# Patient Record
Sex: MALE | Race: BLACK OR AFRICAN AMERICAN | NOT HISPANIC OR LATINO | Employment: OTHER | URBAN - METROPOLITAN AREA
[De-identification: names, ages, dates, MRNs, and addresses within clinical notes are randomized per-mention and may not be internally consistent; named-entity substitution may affect disease eponyms.]

---

## 2017-04-27 ENCOUNTER — HISTORICAL (OUTPATIENT)
Dept: LAB | Facility: HOSPITAL | Age: 72
End: 2017-04-27

## 2017-04-27 ENCOUNTER — HISTORICAL (OUTPATIENT)
Dept: ADMINISTRATIVE | Facility: HOSPITAL | Age: 72
End: 2017-04-27

## 2017-05-09 ENCOUNTER — HISTORICAL (OUTPATIENT)
Dept: LAB | Facility: HOSPITAL | Age: 72
End: 2017-05-09

## 2017-07-05 ENCOUNTER — HISTORICAL (OUTPATIENT)
Dept: LAB | Facility: HOSPITAL | Age: 72
End: 2017-07-05

## 2017-09-18 ENCOUNTER — HISTORICAL (OUTPATIENT)
Dept: LAB | Facility: HOSPITAL | Age: 72
End: 2017-09-18

## 2018-01-18 ENCOUNTER — HISTORICAL (OUTPATIENT)
Dept: LAB | Facility: HOSPITAL | Age: 73
End: 2018-01-18

## 2018-05-11 ENCOUNTER — HISTORICAL (OUTPATIENT)
Dept: LAB | Facility: HOSPITAL | Age: 73
End: 2018-05-11

## 2018-05-13 LAB
FINAL CULTURE: NO GROWTH
FINAL CULTURE: NO GROWTH

## 2018-07-18 ENCOUNTER — HISTORICAL (OUTPATIENT)
Dept: LAB | Facility: HOSPITAL | Age: 73
End: 2018-07-18

## 2018-07-20 LAB
FINAL CULTURE: NO GROWTH
FINAL CULTURE: NO GROWTH

## 2018-07-25 ENCOUNTER — HISTORICAL (OUTPATIENT)
Dept: RADIOLOGY | Facility: HOSPITAL | Age: 73
End: 2018-07-25

## 2018-08-01 ENCOUNTER — HISTORICAL (OUTPATIENT)
Dept: LAB | Facility: HOSPITAL | Age: 73
End: 2018-08-01

## 2018-08-29 ENCOUNTER — HISTORICAL (OUTPATIENT)
Dept: LAB | Facility: HOSPITAL | Age: 73
End: 2018-08-29

## 2018-08-31 ENCOUNTER — HISTORICAL (OUTPATIENT)
Dept: RADIOLOGY | Facility: HOSPITAL | Age: 73
End: 2018-08-31

## 2018-09-07 ENCOUNTER — HISTORICAL (OUTPATIENT)
Dept: SURGERY | Facility: HOSPITAL | Age: 73
End: 2018-09-07

## 2018-10-03 ENCOUNTER — HISTORICAL (OUTPATIENT)
Dept: LAB | Facility: HOSPITAL | Age: 73
End: 2018-10-03

## 2019-01-18 ENCOUNTER — HISTORICAL (OUTPATIENT)
Dept: LAB | Facility: HOSPITAL | Age: 74
End: 2019-01-18

## 2019-05-22 ENCOUNTER — HISTORICAL (OUTPATIENT)
Dept: RADIOLOGY | Facility: HOSPITAL | Age: 74
End: 2019-05-22

## 2019-07-22 ENCOUNTER — HISTORICAL (OUTPATIENT)
Dept: LAB | Facility: HOSPITAL | Age: 74
End: 2019-07-22

## 2020-01-20 ENCOUNTER — HISTORICAL (OUTPATIENT)
Dept: LAB | Facility: HOSPITAL | Age: 75
End: 2020-01-20

## 2020-07-22 ENCOUNTER — HISTORICAL (OUTPATIENT)
Dept: LAB | Facility: HOSPITAL | Age: 75
End: 2020-07-22

## 2020-09-15 LAB
LEFT EYE DM RETINOPATHY: NORMAL
RIGHT EYE DM RETINOPATHY: NORMAL

## 2020-11-17 ENCOUNTER — HISTORICAL (OUTPATIENT)
Dept: LAB | Facility: HOSPITAL | Age: 75
End: 2020-11-17

## 2021-01-04 ENCOUNTER — HISTORICAL (OUTPATIENT)
Dept: LAB | Facility: HOSPITAL | Age: 76
End: 2021-01-04

## 2021-07-06 ENCOUNTER — HISTORICAL (OUTPATIENT)
Dept: LAB | Facility: HOSPITAL | Age: 76
End: 2021-07-06

## 2021-10-15 ENCOUNTER — HISTORICAL (OUTPATIENT)
Dept: LAB | Facility: HOSPITAL | Age: 76
End: 2021-10-15

## 2022-04-07 ENCOUNTER — HISTORICAL (OUTPATIENT)
Dept: ADMINISTRATIVE | Facility: HOSPITAL | Age: 77
End: 2022-04-07
Payer: MEDICARE

## 2022-04-24 VITALS
DIASTOLIC BLOOD PRESSURE: 67 MMHG | OXYGEN SATURATION: 99 % | WEIGHT: 233.69 LBS | HEIGHT: 69 IN | BODY MASS INDEX: 34.61 KG/M2 | SYSTOLIC BLOOD PRESSURE: 152 MMHG

## 2022-05-03 ENCOUNTER — TELEPHONE (OUTPATIENT)
Dept: INTERNAL MEDICINE | Facility: CLINIC | Age: 77
End: 2022-05-03
Payer: MEDICARE

## 2022-05-03 ENCOUNTER — LAB VISIT (OUTPATIENT)
Dept: LAB | Facility: HOSPITAL | Age: 77
End: 2022-05-03
Attending: INTERNAL MEDICINE
Payer: MEDICARE

## 2022-05-03 DIAGNOSIS — I10 HYPERTENSION, UNSPECIFIED TYPE: ICD-10-CM

## 2022-05-03 DIAGNOSIS — E11.9 DIABETES: ICD-10-CM

## 2022-05-03 DIAGNOSIS — E78.5 HYPERLIPIDEMIA, UNSPECIFIED HYPERLIPIDEMIA TYPE: ICD-10-CM

## 2022-05-03 DIAGNOSIS — N40.0 BENIGN PROSTATIC HYPERPLASIA, UNSPECIFIED WHETHER LOWER URINARY TRACT SYMPTOMS PRESENT: Primary | ICD-10-CM

## 2022-05-03 DIAGNOSIS — Z12.5 ENCOUNTER FOR SCREENING FOR MALIGNANT NEOPLASM OF PROSTATE: ICD-10-CM

## 2022-05-03 LAB
ALBUMIN SERPL-MCNC: 3.9 GM/DL (ref 3.4–4.8)
ALBUMIN/GLOB SERPL: 0.9 RATIO (ref 1.1–2)
ALP SERPL-CCNC: 118 UNIT/L (ref 40–150)
ALT SERPL-CCNC: 20 UNIT/L (ref 0–55)
AST SERPL-CCNC: 18 UNIT/L (ref 5–34)
BASOPHILS # BLD AUTO: 0.02 X10(3)/MCL (ref 0–0.2)
BASOPHILS NFR BLD AUTO: 0.3 %
BILIRUBIN DIRECT+TOT PNL SERPL-MCNC: 0.2 MG/DL (ref 0–0.5)
BILIRUBIN DIRECT+TOT PNL SERPL-MCNC: 0.3 MG/DL (ref 0–0.8)
BILIRUBIN DIRECT+TOT PNL SERPL-MCNC: 0.5 MG/DL
BUN SERPL-MCNC: 12 MG/DL (ref 8.4–25.7)
CALCIUM SERPL-MCNC: 10.7 MG/DL (ref 8.8–10)
CHLORIDE SERPL-SCNC: 106 MMOL/L (ref 98–107)
CHOLEST SERPL-MCNC: 184 MG/DL
CHOLEST/HDLC SERPL: 3 {RATIO} (ref 0–5)
CO2 SERPL-SCNC: 25 MMOL/L (ref 23–31)
CREAT SERPL-MCNC: 1.21 MG/DL (ref 0.73–1.18)
CREAT UR-MCNC: 73.5 MG/DL (ref 63–166)
EOSINOPHIL # BLD AUTO: 0.25 X10(3)/MCL (ref 0–0.9)
EOSINOPHIL NFR BLD AUTO: 4.1 %
ERYTHROCYTE [DISTWIDTH] IN BLOOD BY AUTOMATED COUNT: 13.6 % (ref 11.5–17)
EST. AVERAGE GLUCOSE BLD GHB EST-MCNC: 145.6 MG/DL
GLOBULIN SER-MCNC: 4.2 GM/DL (ref 2.4–3.5)
GLUCOSE SERPL-MCNC: 127 MG/DL (ref 82–115)
HBA1C MFR BLD: 6.7 %
HCT VFR BLD AUTO: 46.7 % (ref 42–52)
HDLC SERPL-MCNC: 62 MG/DL (ref 35–60)
HGB BLD-MCNC: 14.8 GM/DL (ref 14–18)
IMM GRANULOCYTES # BLD AUTO: 0.01 X10(3)/MCL (ref 0–0.02)
IMM GRANULOCYTES NFR BLD AUTO: 0.2 % (ref 0–0.43)
LDLC SERPL CALC-MCNC: 94 MG/DL (ref 50–140)
LYMPHOCYTES # BLD AUTO: 2.76 X10(3)/MCL (ref 0.6–4.6)
LYMPHOCYTES NFR BLD AUTO: 44.7 %
MCH RBC QN AUTO: 30.1 PG (ref 27–31)
MCHC RBC AUTO-ENTMCNC: 31.7 MG/DL (ref 33–36)
MCV RBC AUTO: 94.9 FL (ref 80–94)
MICROALBUMIN UR-MCNC: 6.1 UG/ML
MICROALBUMIN/CREAT RATIO PNL UR: 8.3 MG/GM CR (ref 0–30)
MONOCYTES # BLD AUTO: 0.65 X10(3)/MCL (ref 0.1–1.3)
MONOCYTES NFR BLD AUTO: 10.5 %
NEUTROPHILS # BLD AUTO: 2.5 X10(3)/MCL (ref 2.1–9.2)
NEUTROPHILS NFR BLD AUTO: 40.2 %
PLATELET # BLD AUTO: 294 X10(3)/MCL (ref 130–400)
PMV BLD AUTO: 8.2 FL (ref 9.4–12.4)
POTASSIUM SERPL-SCNC: 3.8 MMOL/L (ref 3.5–5.1)
PROT SERPL-MCNC: 8.1 GM/DL (ref 5.8–7.6)
PSA SERPL-MCNC: 7.49 NG/ML
RBC # BLD AUTO: 4.92 X10(6)/MCL (ref 4.7–6.1)
SODIUM SERPL-SCNC: 141 MMOL/L (ref 136–145)
TRIGL SERPL-MCNC: 141 MG/DL (ref 34–140)
VLDLC SERPL CALC-MCNC: 28 MG/DL
WBC # SPEC AUTO: 6.2 X10(3)/MCL (ref 4.5–11.5)

## 2022-05-03 PROCEDURE — 82043 UR ALBUMIN QUANTITATIVE: CPT

## 2022-05-03 PROCEDURE — 84153 ASSAY OF PSA TOTAL: CPT

## 2022-05-03 PROCEDURE — 83036 HEMOGLOBIN GLYCOSYLATED A1C: CPT

## 2022-05-03 PROCEDURE — 36415 COLL VENOUS BLD VENIPUNCTURE: CPT

## 2022-05-03 PROCEDURE — 80053 COMPREHEN METABOLIC PANEL: CPT

## 2022-05-03 PROCEDURE — 80061 LIPID PANEL: CPT

## 2022-05-03 PROCEDURE — 85025 COMPLETE CBC W/AUTO DIFF WBC: CPT

## 2022-05-03 NOTE — TELEPHONE ENCOUNTER
Patient labs resulted to my inbox; noted he has an appointment coming up with you, not me on the 9th.  PSA is elevated.

## 2022-05-06 ENCOUNTER — OFFICE VISIT (OUTPATIENT)
Dept: INTERNAL MEDICINE | Facility: CLINIC | Age: 77
End: 2022-05-06
Payer: MEDICARE

## 2022-05-06 VITALS
BODY MASS INDEX: 35.72 KG/M2 | HEIGHT: 69 IN | DIASTOLIC BLOOD PRESSURE: 63 MMHG | WEIGHT: 241.19 LBS | HEART RATE: 71 BPM | TEMPERATURE: 98 F | SYSTOLIC BLOOD PRESSURE: 138 MMHG | OXYGEN SATURATION: 97 %

## 2022-05-06 DIAGNOSIS — I15.2 HYPERTENSION ASSOCIATED WITH DIABETES: ICD-10-CM

## 2022-05-06 DIAGNOSIS — N40.0 BPH WITH ELEVATED PSA: ICD-10-CM

## 2022-05-06 DIAGNOSIS — E78.5 TYPE 2 DIABETES MELLITUS WITH HYPERLIPIDEMIA: ICD-10-CM

## 2022-05-06 DIAGNOSIS — Z00.00 MEDICARE ANNUAL WELLNESS VISIT, SUBSEQUENT: Primary | ICD-10-CM

## 2022-05-06 DIAGNOSIS — E83.52 HYPERCALCEMIA: ICD-10-CM

## 2022-05-06 DIAGNOSIS — D47.2 MONOCLONAL GAMMOPATHY OF UNKNOWN SIGNIFICANCE (MGUS): ICD-10-CM

## 2022-05-06 DIAGNOSIS — E11.59 HYPERTENSION ASSOCIATED WITH DIABETES: ICD-10-CM

## 2022-05-06 DIAGNOSIS — E66.01 SEVERE OBESITY (BMI 35.0-39.9) WITH COMORBIDITY: ICD-10-CM

## 2022-05-06 DIAGNOSIS — E55.9 VITAMIN D DEFICIENCY: ICD-10-CM

## 2022-05-06 DIAGNOSIS — E78.5 HYPERLIPIDEMIA, UNSPECIFIED HYPERLIPIDEMIA TYPE: ICD-10-CM

## 2022-05-06 DIAGNOSIS — R97.20 BPH WITH ELEVATED PSA: ICD-10-CM

## 2022-05-06 DIAGNOSIS — E11.69 TYPE 2 DIABETES MELLITUS WITH HYPERLIPIDEMIA: ICD-10-CM

## 2022-05-06 PROBLEM — E11.9 TYPE 2 DIABETES MELLITUS, WITHOUT LONG-TERM CURRENT USE OF INSULIN: Status: ACTIVE | Noted: 2022-05-06

## 2022-05-06 PROBLEM — M1A.9XX0 CHRONIC GOUT: Status: ACTIVE | Noted: 2022-05-06

## 2022-05-06 PROCEDURE — 1126F PR PAIN SEVERITY QUANTIFIED, NO PAIN PRESENT: ICD-10-PCS | Mod: CPTII,,, | Performed by: NURSE PRACTITIONER

## 2022-05-06 PROCEDURE — 3288F PR FALLS RISK ASSESSMENT DOCUMENTED: ICD-10-PCS | Mod: CPTII,,, | Performed by: NURSE PRACTITIONER

## 2022-05-06 PROCEDURE — 1160F RVW MEDS BY RX/DR IN RCRD: CPT | Mod: CPTII,,, | Performed by: NURSE PRACTITIONER

## 2022-05-06 PROCEDURE — 1160F PR REVIEW ALL MEDS BY PRESCRIBER/CLIN PHARMACIST DOCUMENTED: ICD-10-PCS | Mod: CPTII,,, | Performed by: NURSE PRACTITIONER

## 2022-05-06 PROCEDURE — 3075F PR MOST RECENT SYSTOLIC BLOOD PRESS GE 130-139MM HG: ICD-10-PCS | Mod: CPTII,,, | Performed by: NURSE PRACTITIONER

## 2022-05-06 PROCEDURE — 1101F PT FALLS ASSESS-DOCD LE1/YR: CPT | Mod: CPTII,,, | Performed by: NURSE PRACTITIONER

## 2022-05-06 PROCEDURE — 99397 PER PM REEVAL EST PAT 65+ YR: CPT | Mod: GY,,, | Performed by: NURSE PRACTITIONER

## 2022-05-06 PROCEDURE — 3078F DIAST BP <80 MM HG: CPT | Mod: CPTII,,, | Performed by: NURSE PRACTITIONER

## 2022-05-06 PROCEDURE — 3066F NEPHROPATHY DOC TX: CPT | Mod: CPTII,,, | Performed by: NURSE PRACTITIONER

## 2022-05-06 PROCEDURE — 3008F PR BODY MASS INDEX (BMI) DOCUMENTED: ICD-10-PCS | Mod: CPTII,,, | Performed by: NURSE PRACTITIONER

## 2022-05-06 PROCEDURE — 1159F MED LIST DOCD IN RCRD: CPT | Mod: CPTII,,, | Performed by: NURSE PRACTITIONER

## 2022-05-06 PROCEDURE — 3061F PR NEG MICROALBUMINURIA RESULT DOCUMENTED/REVIEW: ICD-10-PCS | Mod: CPTII,,, | Performed by: NURSE PRACTITIONER

## 2022-05-06 PROCEDURE — 3066F PR DOCUMENTATION OF TREATMENT FOR NEPHROPATHY: ICD-10-PCS | Mod: CPTII,,, | Performed by: NURSE PRACTITIONER

## 2022-05-06 PROCEDURE — 3008F BODY MASS INDEX DOCD: CPT | Mod: CPTII,,, | Performed by: NURSE PRACTITIONER

## 2022-05-06 PROCEDURE — 1126F AMNT PAIN NOTED NONE PRSNT: CPT | Mod: CPTII,,, | Performed by: NURSE PRACTITIONER

## 2022-05-06 PROCEDURE — 3078F PR MOST RECENT DIASTOLIC BLOOD PRESSURE < 80 MM HG: ICD-10-PCS | Mod: CPTII,,, | Performed by: NURSE PRACTITIONER

## 2022-05-06 PROCEDURE — 1159F PR MEDICATION LIST DOCUMENTED IN MEDICAL RECORD: ICD-10-PCS | Mod: CPTII,,, | Performed by: NURSE PRACTITIONER

## 2022-05-06 PROCEDURE — 3288F FALL RISK ASSESSMENT DOCD: CPT | Mod: CPTII,,, | Performed by: NURSE PRACTITIONER

## 2022-05-06 PROCEDURE — 3075F SYST BP GE 130 - 139MM HG: CPT | Mod: CPTII,,, | Performed by: NURSE PRACTITIONER

## 2022-05-06 PROCEDURE — 99397 PR PREVENTIVE VISIT,EST,65 & OVER: ICD-10-PCS | Mod: GY,,, | Performed by: NURSE PRACTITIONER

## 2022-05-06 PROCEDURE — 3061F NEG MICROALBUMINURIA REV: CPT | Mod: CPTII,,, | Performed by: NURSE PRACTITIONER

## 2022-05-06 PROCEDURE — 1101F PR PT FALLS ASSESS DOC 0-1 FALLS W/OUT INJ PAST YR: ICD-10-PCS | Mod: CPTII,,, | Performed by: NURSE PRACTITIONER

## 2022-05-06 RX ORDER — FINASTERIDE 5 MG/1
TABLET, FILM COATED ORAL
COMMUNITY
Start: 2021-05-05 | End: 2022-06-08

## 2022-05-06 RX ORDER — OLMESARTAN MEDOXOMIL AND HYDROCHLOROTHIAZIDE 40/25 40; 25 MG/1; MG/1
TABLET ORAL
COMMUNITY
Start: 2021-05-18 | End: 2022-05-21 | Stop reason: SDUPTHER

## 2022-05-06 RX ORDER — METFORMIN HYDROCHLORIDE 1000 MG/1
TABLET ORAL
COMMUNITY
Start: 2021-05-18 | End: 2022-06-13

## 2022-05-06 RX ORDER — FEBUXOSTAT 40 MG/1
TABLET, FILM COATED ORAL
COMMUNITY
Start: 2021-12-13 | End: 2022-06-27 | Stop reason: SDUPTHER

## 2022-05-06 RX ORDER — AMLODIPINE BESYLATE 10 MG/1
10 TABLET ORAL
COMMUNITY
Start: 2021-07-01 | End: 2022-05-06 | Stop reason: SDUPTHER

## 2022-05-06 RX ORDER — AMLODIPINE BESYLATE 10 MG/1
10 TABLET ORAL DAILY
Qty: 90 TABLET | Refills: 3 | Status: SHIPPED | OUTPATIENT
Start: 2022-05-06 | End: 2023-03-28 | Stop reason: SDUPTHER

## 2022-05-06 RX ORDER — ATORVASTATIN CALCIUM 40 MG/1
40 TABLET, FILM COATED ORAL NIGHTLY
Qty: 90 TABLET | Refills: 3 | Status: SHIPPED | OUTPATIENT
Start: 2022-05-06 | End: 2023-03-28 | Stop reason: SDUPTHER

## 2022-05-06 RX ORDER — ORAL SEMAGLUTIDE 14 MG/1
14 TABLET ORAL DAILY
Qty: 90 TABLET | Refills: 3 | Status: SHIPPED | OUTPATIENT
Start: 2022-05-06 | End: 2023-08-11

## 2022-05-06 RX ORDER — ERGOCALCIFEROL 1.25 MG/1
CAPSULE ORAL
COMMUNITY
Start: 2022-03-30 | End: 2022-09-15

## 2022-05-06 RX ORDER — CARVEDILOL 6.25 MG/1
6.25 TABLET ORAL
COMMUNITY
Start: 2021-07-20 | End: 2022-09-01

## 2022-05-06 RX ORDER — ATORVASTATIN CALCIUM 20 MG/1
TABLET, FILM COATED ORAL
COMMUNITY
Start: 2021-06-17 | End: 2022-05-06 | Stop reason: SDUPTHER

## 2022-05-06 RX ORDER — METOPROLOL SUCCINATE 50 MG/1
TABLET, EXTENDED RELEASE ORAL
COMMUNITY
Start: 2022-02-16 | End: 2022-09-01

## 2022-05-06 NOTE — PROGRESS NOTES
Patient Name: Galdino Joshi     Date of service:  5/6/22      Member ID: [id number] - (Managed Medicare)    YOB: 1945   Gender: male   Race: White      Ethnicity: Not  or /a   Medical Record Number: [id number]     Reason for Visit:   Chief Complaint   Patient presents with    Medicare AWV        History of Present Illness: HPI   76-year-old -American male presents to clinic today formedicare wellness  A1c 6.7  Labs reviewed with patient.  He has never had a colonoscopy and does not want a colonoscopy right now.   Dr. Sharp- urology for BPH, PSA  Dr. Adorno- cardiologist  Dr. Dawkins- IGG monoclonal gammopathy--no longer has f/u;stable  UTD with pneumonia vaccines, flu vaccine,  declines shingles vaccine from pharmacy  Covid vaccinated    No acute complaints today    Past Medical History:   Diagnosis Date    BPH (benign prostatic hyperplasia)     Essential (primary) hypertension     Mixed hyperlipidemia     Proteinuria     Type 2 diabetes mellitus without complication, without long-term current use of insulin       Providers regularly involved with care (specialists/suppliers):   Patient Care Team:  Francesco Salomon MD as PCP - General (Internal Medicine)     Past Surgical History:   Procedure Laterality Date    CARPAL TUNNEL RELEASE N/A     CARPAL TUNNEL RELEASE N/A     neuroplasty median nerve at carpal tunnel  Right     RELEASE OF ULNAR NERVE AT CUBITAL TUNNEL Right     SPINAL FUSION          Medication reconciliation completed.    Current Outpatient Medications   Medication Sig    atorvastatin (LIPITOR) 10 MG tablet Take 10 mg by mouth once daily.    donepeziL (ARICEPT) 10 MG tablet Take 10 mg by mouth once daily.    losartan (COZAAR) 25 MG tablet Take 25 mg by mouth once daily.    warfarin (COUMADIN) 3 MG tablet Take 6 mg by mouth Daily.     No current facility-administered medications for this visit.      There are no discontinued medications.     Review  of patient's allergies indicates:  No Known Allergies  Social History     Socioeconomic History    Marital status:    Tobacco Use    Smoking status: Never Smoker    Smokeless tobacco: Current User     Types: Chew     Social Determinants of Health     Financial Resource Strain: Low Risk     Difficulty of Paying Living Expenses: Not hard at all   Food Insecurity: No Food Insecurity    Worried About Running Out of Food in the Last Year: Never true    Ran Out of Food in the Last Year: Never true   Transportation Needs: No Transportation Needs    Lack of Transportation (Medical): No    Lack of Transportation (Non-Medical): No   Physical Activity: Sufficiently Active    Days of Exercise per Week: 7 days    Minutes of Exercise per Session: 60 min   Social Connections: Socially Integrated    Frequency of Communication with Friends and Family: More than three times a week    Frequency of Social Gatherings with Friends and Family: Once a week    Attends Restorationism Services: More than 4 times per year    Active Member of Clubs or Organizations: No    Attends Club or Organization Meetings: 1 to 4 times per year    Marital Status:    Housing Stability: Unknown    Unable to Pay for Housing in the Last Year: No    Unstable Housing in the Last Year: No        Family History   Problem Relation Age of Onset    Heart disease Mother           Review of Systems   Constitutional: No fever,  no fatigue, no chills, no night sweats, no weight gain, no weight loss, no changes in appetite.   Eye: No redness, no acute vision loss, no blurred vision, no double vision, no eye pain  ENMT: No sore throat, no nasal drainage, no nose bleeds,  no headache, no ear pain, no ear drainage, no acute hearing loss  Respiratory: No cough, no sputum production, no shortness of breath, no hemoptysis, no wheezing.  Cardiovascular: No chest pain, no chest tightness, no PENA, no PND, no orthopnea, no swelling, no  "palpitations.  Gastrointestinal: No abdominal pain, no nausea, no vomiting, no diarrhea, no constipation, no difficulty swallowing, no change in bowel habits, no rectal bleeding  Genitourinary: no urgency, no frequency, no burning or pain when urinating, no blood in urine, no incontinence  Heme/Lymph: No easy bruising and/or bleeding, no swollen or painful glands.  Endocrine: No polyuria, no polydipsia, no polyphagia, no heat or cold intolerance.  Musculoskeletal: No muscle pain, no muscle weakness, no joint pain, no red or swollen joints.  Integumentary: No rash, no pruritis, no hair or nail changes.  Neurologic: No dizziness, no fainting, no tremors, no tingling and/ or numbness.  Psychiatric: No anxiety, no depression, no memory loss  All Other ROS: Negative with exception of what is documented in the history of present illness      Vitals:    05/06/22 0949   BP: (!) 158/63   Pulse: 71   Temp: 98.4 °F (36.9 °C)   TempSrc: Temporal   SpO2: 97%   Weight: 109.4 kg (241 lb 3.2 oz)   Height: 5' 8.9" (1.75 m)   PainSc: 0-No pain      Body mass index is 35.72 kg/m².     BP RECHECK 138/63    Physical Exam     General : Alert and oriented, No acute distress, well, developed, well nourished, afebrile   Eye :  Normal conjunctiva without injection. Sclerae are nonicteric. No pallor.  HEENT : Normocephalic. Neck supple. Normal hearing. Oral mucosa is moist.  Respiratory : Lungs are clear to auscultation bilaterally, non-labored. Symmetrical chest wall expansion. No crackles, wheeze, or rhonci.  Cardiovascular : Normal rate, Regular rhythm. No murmurs, rubs, or gallops.No Edema.  Gastrointestinal : Soft, nontender, non-distended, bowel sounds normal, no organomegaly, no guarding, no rebound.  Musculoskeletal : Normal ROM.  No muscle tenderness.  Integumentary : Warm to touch. Intact. No rash.    Neurologic : Alert and oriented.   Psychiatric : Cooperative, Appropriate mood & affect.         Health Maintenance Topics with due " status: Not Due       Topic Last Completion Date    Lipid Panel 05/03/2022        Social History     Tobacco Use   Smoking Status Never Smoker   Smokeless Tobacco Current User    Types: Chew          Immunization History   Administered Date(s) Administered    COVID-19, MRNA, LN-S, PF (Pfizer) (Purple Cap) 01/14/2021, 02/04/2021, 10/08/2021, 04/07/2022    Influenza - Trivalent - PF (ADULT) 09/18/2017, 01/18/2019, 11/19/2020, 10/19/2021    Pneumococcal Conjugate - 13 Valent 01/18/2018    Pneumococcal Polysaccharide - 23 Valent 07/24/2019        Lab Results   Component Value Date    CHOL 184 05/03/2022    TRIG 141 (H) 05/03/2022    HDL 62 (H) 05/03/2022    TOTALCHOLEST 3 05/03/2022      Lab Visit on 05/03/2022   Component Date Value Ref Range Status    Urine Microalbumin 05/03/2022 6.1  <=30.0 ug/ml Final    Urine Creatinine 05/03/2022 73.5  63.0 - 166.0 mg/dL Final    Microalbumin Creatinine Ratio 05/03/2022 8.3  0.0 - 30.0 mg/gm Cr Final    Hemoglobin A1c 05/03/2022 6.7  <=7.0 % Final    Estimated Average Glucose 05/03/2022 145.6  mg/dL Final    Sodium Level 05/03/2022 141  136 - 145 mmol/L Final    Potassium Level 05/03/2022 3.8  3.5 - 5.1 mmol/L Final    Chloride 05/03/2022 106  98 - 107 mmol/L Final    Carbon Dioxide 05/03/2022 25  23 - 31 mmol/L Final    Glucose Level 05/03/2022 127 (A) 82 - 115 mg/dL Final    Blood Urea Nitrogen 05/03/2022 12.0  8.4 - 25.7 mg/dL Final    Creatinine 05/03/2022 1.21 (A) 0.73 - 1.18 mg/dL Final    Calcium Level Total 05/03/2022 10.7 (A) 8.8 - 10.0 mg/dL Final    Protein Total 05/03/2022 8.1 (A) 5.8 - 7.6 gm/dL Final    Albumin Level 05/03/2022 3.9  3.4 - 4.8 gm/dL Final    Globulin 05/03/2022 4.2 (A) 2.4 - 3.5 gm/dL Final    Albumin/Globulin Ratio 05/03/2022 0.9 (A) 1.1 - 2.0 ratio Final    Bilirubin Total 05/03/2022 0.5  <=1.5 mg/dL Final    Bilirubin Direct 05/03/2022 0.2  0.0 - 0.5 mg/dL Final    Bilirubin Indirect 05/03/2022 0.30  0.00 - 0.80 mg/dL  Final    Alkaline Phosphatase 05/03/2022 118  40 - 150 unit/L Final    Alanine Aminotransferase 05/03/2022 20  0 - 55 unit/L Final    Aspartate Aminotransferase 05/03/2022 18  5 - 34 unit/L Final    Estimated GFR- 05/03/2022 >60  mls/min/1.73/m2 Final    Estimated GFR-Non  05/03/2022 >60  mls/min/1.73/m2 Final    Cholesterol Total 05/03/2022 184  <=200 mg/dL Final    HDL Cholesterol 05/03/2022 62 (A) 35 - 60 mg/dL Final    Triglyceride 05/03/2022 141 (A) 34 - 140 mg/dL Final    Cholesterol/HDL Ratio 05/03/2022 3  0 - 5 Final    Very Low Density Lipoprotein 05/03/2022 28   Final    LDL Cholesterol 05/03/2022 94.00  50.00 - 140.00 mg/dL Final    WBC 05/03/2022 6.2  4.5 - 11.5 x10(3)/mcL Final    RBC 05/03/2022 4.92  4.70 - 6.10 x10(6)/mcL Final    Hgb 05/03/2022 14.8  14.0 - 18.0 gm/dL Final    Hct 05/03/2022 46.7  42.0 - 52.0 % Final    MCV 05/03/2022 94.9 (A) 80.0 - 94.0 fL Final    MCH 05/03/2022 30.1  27.0 - 31.0 pg Final    MCHC 05/03/2022 31.7 (A) 33.0 - 36.0 mg/dL Final    RDW 05/03/2022 13.6  11.5 - 17.0 % Final    Platelet 05/03/2022 294  130 - 400 x10(3)/mcL Final    MPV 05/03/2022 8.2 (A) 9.4 - 12.4 fL Final    Neutro Auto 05/03/2022 40.2  % Final    Lymph Auto 05/03/2022 44.7  % Final    Mono Auto 05/03/2022 10.5  % Final    Eos Auto 05/03/2022 4.1  % Final    Basophil Auto 05/03/2022 0.3  % Final    Abs Lymph 05/03/2022 2.76  0.6 - 4.6 x10(3)/mcL Final    Abs Neutro 05/03/2022 2.5  2.1 - 9.2 x10(3)/mcL Final    Abs Mono 05/03/2022 0.65  0.1 - 1.3 x10(3)/mcL Final    Abs Eos 05/03/2022 0.25  0 - 0.9 x10(3)/mcL Final    Abs Baso 05/03/2022 0.02  0 - 0.2 x10(3)/mcL Final    IG# 05/03/2022 0.01  0 - 0.0155 x10(3)/mcL Final    IG% 05/03/2022 0.2  0 - 0.43 % Final    Prostate Specific Antigen 05/03/2022 7.49 (A) <=4.00 ng/mL Final     The following assessments were completed and reviewed.  Timed Get Up and Go 5sec  Depression screening  Whisper  Test normal  Nutrition screening  Cognitive function screening  ADLs/Functional status assessment  Physical Activity Questionnaire (PAQ)  Functional/Cognitive Status: Disability Status      Diagnoses with ICD-10 code:    ICD-10-CM ICD-9-CM   1. Medicare annual wellness visit, subsequent  Z00.00 V70.0   2. Hypertension associated with diabetes  E11.59 250.80    I15.2 401.9   3. BPH with elevated PSA  N40.0 600.00    R97.20 790.93   4. Monoclonal gammopathy of unknown significance (MGUS)  D47.2 273.1   5. Vitamin D deficiency  E55.9 268.9      Diagnoses with associated orders:  Problem List Items Addressed This Visit        Cardiac/Vascular    Hypertension associated with diabetes    Relevant Medications    metFORMIN (GLUCOPHAGE) 1000 MG tablet    semaglutide (RYBELSUS) 7 mg tablet       Renal/    BPH with elevated PSA    Overview     Followed by Dr. Sharp, long history of elevated PSA, benign biopsy. Has f/u every 6 months              Oncology    Monoclonal gammopathy of unknown significance (MGUS)    Overview     Followed by Dr. Dawkins              Endocrine    Vitamin D deficiency      Other Visit Diagnoses     Medicare annual wellness visit, subsequent    -  Primary          6. Severe obesity (BMI 35.0-39.9) with comorbidity  Weight loss encouraged, rybelsus increased to help as well    7. Hyperlipidemia, unspecified hyperlipidemia type  ldl not  At goal, ldl goal <7  - atorvastatin (LIPITOR) 40 MG tablet; Take 1 tablet (40 mg total) by mouth every evening.  Dispense: 90 tablet; Refill: 3    8. Type 2 diabetes mellitus with hyperlipidemia  rybelsus increased  Atorvastatin increased  - semaglutide (RYBELSUS) 14 mg tablet; Take 1 tablet (14 mg total) by mouth once daily.  Dispense: 90 tablet; Refill: 3    9. Hypercalcemia  10.7; on hctz  Advised to drink more water  Recheck in 6 weeks  - Basic Metabolic Panel; Future      Plan:  1.  Continue current medications  2.  Side effects and expected results  discussed  3.  Diet and exercise as tolerated  4.  Nutritional support  5.  Health maintenance updated accordingly  6.  Call with increased complaints or concerns  7.  General health maintenance education given.   Age appropriate screenings are up to date/discussed/declined  Vaccines discussed  Labs reviewed with patient.         Screening/prevention plan for the next 10 years:  Goal of exercise is 150 minutes a week.   Encouraged to follow balanced diet with daily servings of fresh fruit and vegetables.  Make sure to schedule all health maintenance appointments to achieve health care goals.   Annual check up is due every 12 months with your designated provider/care team.  Health Maintenance Due   Topic Date Due    Hepatitis C Screening  Never done    TETANUS VACCINE  Never done    Colorectal Cancer Screening  Never done    Shingles Vaccine (1 of 2) Never done      Follow-up: f/u in 6m for dm revisit  Patient Instructions:  Instructions attatched     [Negative] : Musculoskeletal

## 2022-05-06 NOTE — ASSESSMENT & PLAN NOTE
A1C 6.7%, increase rybelsus from 7 to 14mg to help with weight loss  LDL 90, increase atorvastatin from 20 to 40mg for LDL goal <70

## 2022-05-06 NOTE — PROGRESS NOTES
75-year-old -American male presents to clinic today for 3m revisit  Weight down 7lbs since last visit  A1c 6.8; now 6.3  Labs reviewed with patient.  He has never had a colonoscopy and does not want a colonoscopy right now.   Dr. Sharp- urology for BPH, PSA?  Dr. Adorno- cardiologist  Dr. Dawkins- IGG monoclonal gammopathy  UTD with pneumonia vaccines, flu vaccine, shingles vaccine from pharmacy  Diabetic eye exams in January.  Vitamin D is low advised to increase his does to 50 IU once a week  Covid vaccinated  Home blood pressure log reviewed and excellent    Lab Visit on 05/03/2022   Component Date Value Ref Range Status    Urine Microalbumin 05/03/2022 6.1  <=30.0 ug/ml Final    Urine Creatinine 05/03/2022 73.5  63.0 - 166.0 mg/dL Final    Microalbumin Creatinine Ratio 05/03/2022 8.3  0.0 - 30.0 mg/gm Cr Final    Hemoglobin A1c 05/03/2022 6.7  <=7.0 % Final    Estimated Average Glucose 05/03/2022 145.6  mg/dL Final    Sodium Level 05/03/2022 141  136 - 145 mmol/L Final    Potassium Level 05/03/2022 3.8  3.5 - 5.1 mmol/L Final    Chloride 05/03/2022 106  98 - 107 mmol/L Final    Carbon Dioxide 05/03/2022 25  23 - 31 mmol/L Final    Glucose Level 05/03/2022 127 (A) 82 - 115 mg/dL Final    Blood Urea Nitrogen 05/03/2022 12.0  8.4 - 25.7 mg/dL Final    Creatinine 05/03/2022 1.21 (A) 0.73 - 1.18 mg/dL Final    Calcium Level Total 05/03/2022 10.7 (A) 8.8 - 10.0 mg/dL Final    Protein Total 05/03/2022 8.1 (A) 5.8 - 7.6 gm/dL Final    Albumin Level 05/03/2022 3.9  3.4 - 4.8 gm/dL Final    Globulin 05/03/2022 4.2 (A) 2.4 - 3.5 gm/dL Final    Albumin/Globulin Ratio 05/03/2022 0.9 (A) 1.1 - 2.0 ratio Final    Bilirubin Total 05/03/2022 0.5  <=1.5 mg/dL Final    Bilirubin Direct 05/03/2022 0.2  0.0 - 0.5 mg/dL Final    Bilirubin Indirect 05/03/2022 0.30  0.00 - 0.80 mg/dL Final    Alkaline Phosphatase 05/03/2022 118  40 - 150 unit/L Final    Alanine Aminotransferase 05/03/2022 20  0 - 55 unit/L  Final    Aspartate Aminotransferase 05/03/2022 18  5 - 34 unit/L Final    Estimated GFR- 05/03/2022 >60  mls/min/1.73/m2 Final    Estimated GFR-Non  05/03/2022 >60  mls/min/1.73/m2 Final    Cholesterol Total 05/03/2022 184  <=200 mg/dL Final    HDL Cholesterol 05/03/2022 62 (A) 35 - 60 mg/dL Final    Triglyceride 05/03/2022 141 (A) 34 - 140 mg/dL Final    Cholesterol/HDL Ratio 05/03/2022 3  0 - 5 Final    Very Low Density Lipoprotein 05/03/2022 28   Final    LDL Cholesterol 05/03/2022 94.00  50.00 - 140.00 mg/dL Final    WBC 05/03/2022 6.2  4.5 - 11.5 x10(3)/mcL Final    RBC 05/03/2022 4.92  4.70 - 6.10 x10(6)/mcL Final    Hgb 05/03/2022 14.8  14.0 - 18.0 gm/dL Final    Hct 05/03/2022 46.7  42.0 - 52.0 % Final    MCV 05/03/2022 94.9 (A) 80.0 - 94.0 fL Final    MCH 05/03/2022 30.1  27.0 - 31.0 pg Final    MCHC 05/03/2022 31.7 (A) 33.0 - 36.0 mg/dL Final    RDW 05/03/2022 13.6  11.5 - 17.0 % Final    Platelet 05/03/2022 294  130 - 400 x10(3)/mcL Final    MPV 05/03/2022 8.2 (A) 9.4 - 12.4 fL Final    Neutro Auto 05/03/2022 40.2  % Final    Lymph Auto 05/03/2022 44.7  % Final    Mono Auto 05/03/2022 10.5  % Final    Eos Auto 05/03/2022 4.1  % Final    Basophil Auto 05/03/2022 0.3  % Final    Abs Lymph 05/03/2022 2.76  0.6 - 4.6 x10(3)/mcL Final    Abs Neutro 05/03/2022 2.5  2.1 - 9.2 x10(3)/mcL Final    Abs Mono 05/03/2022 0.65  0.1 - 1.3 x10(3)/mcL Final    Abs Eos 05/03/2022 0.25  0 - 0.9 x10(3)/mcL Final    Abs Baso 05/03/2022 0.02  0 - 0.2 x10(3)/mcL Final    IG# 05/03/2022 0.01  0 - 0.0155 x10(3)/mcL Final    IG% 05/03/2022 0.2  0 - 0.43 % Final    Prostate Specific Antigen 05/03/2022 7.49 (A) <=4.00 ng/mL Final

## 2022-06-27 ENCOUNTER — TELEPHONE (OUTPATIENT)
Dept: INTERNAL MEDICINE | Facility: CLINIC | Age: 77
End: 2022-06-27
Payer: MEDICARE

## 2022-06-27 RX ORDER — COLCHICINE 0.6 MG/1
TABLET ORAL
Qty: 3 TABLET | Refills: 4 | Status: SHIPPED | OUTPATIENT
Start: 2022-06-27

## 2022-06-27 RX ORDER — FEBUXOSTAT 40 MG/1
40 TABLET, FILM COATED ORAL DAILY
Qty: 90 TABLET | Refills: 3 | Status: SHIPPED | OUTPATIENT
Start: 2022-06-27 | End: 2023-08-11

## 2022-06-27 NOTE — TELEPHONE ENCOUNTER
----- Message from Briana Luke sent at 6/27/2022 10:02 AM CDT -----  Regarding: Advice  Type:  Needs Medical Advice    Who Called: Patient wife  Symptoms (please be specific): Gout flare up   How long has patient had these symptoms:  couple days ago   [facility] and [facility]  Would the patient rather a call back or a response via [facility]?   Best Call Back Number:  [phone]  Additional Information:

## 2022-06-27 NOTE — TELEPHONE ENCOUNTER
----- Message from Briana Luke sent at 6/27/2022 10:02 AM CDT -----  Regarding: Advice  Type:  Needs Medical Advice    Who Called: Patient wife  Symptoms (please be specific): Gout flare up   How long has patient had these symptoms:  couple days ago   [facility]  Would the patient rather a call back or a response via [facility]?   Best Call Back Number:  [phone]  Additional Information:

## 2022-07-05 NOTE — PROGRESS NOTES
Patient ID: [id number]     Chief Complaint: left foot pain         HPI:     Galdino Joshi is a 76 y.o. male here today with c/o pain to ball of left foot x weeks  Thought he was having gout flare, took colchicine without relief  No prior injury to foot or increased exercise/activity recently  Reports pain as dull ache with associated numbness/tingling           He has never had a colonoscopy and does not want a colonoscopy right now.   Dr. Sharp- urology for BPH, PSA  Dr. Adorno- cardiologist  Dr. Dawkins- IGG monoclonal gammopathy--no longer has f/u;stable  UTD with pneumonia vaccines, flu vaccine,  declines shingles vaccine from pharmacy  Covid vaccinated      Past Medical History:  BPH (benign prostatic hyperplasia)  Essential (primary) hypertension  Mixed hyperlipidemia  Proteinuria  Type 2 diabetes mellitus without complication, without long-term   current use of insulin     Past Surgical History:   Procedure Laterality Date    CARPAL TUNNEL RELEASE N/A     CARPAL TUNNEL RELEASE N/A     neuroplasty median nerve at carpal tunnel  Right     RELEASE OF ULNAR NERVE AT CUBITAL TUNNEL Right     SPINAL FUSION         Review of patient's allergies indicates:  No Known Allergies    Outpatient Medications Marked as Taking for the 7/6/22 encounter (Office Visit) with TREY Fitzgerald   Medication Sig Dispense Refill    amLODIPine (NORVASC) 10 MG tablet Take 1 tablet (10 mg total) by mouth once daily. 90 tablet 3    atorvastatin (LIPITOR) 40 MG tablet Take 1 tablet (40 mg total) by mouth every evening. 90 tablet 3    carvediloL (COREG) 6.25 MG tablet Take 6.25 mg by mouth.      colchicine (COLCRYS) 0.6 mg tablet 1.2 mg p.o. x1 then 0.6 mg p.o. 1 hour later x1 3 tablet 4    ergocalciferol (ERGOCALCIFEROL) 50,000 unit Cap       febuxostat (ULORIC) 40 mg Tab Take 1 tablet (40 mg total) by mouth once daily. 90 tablet 3    finasteride (PROSCAR) 5 mg tablet TAKE 1 TABLET EVERY DAY 90 tablet 4    metFORMIN  (GLUCOPHAGE) 1000 MG tablet TAKE 1 TABLET TWICE DAILY 180 tablet 4    metoprolol succinate (TOPROL-XL) 50 MG 24 hr tablet       olmesartan-hydrochlorothiazide (BENICAR HCT) 40-25 mg per tablet TAKE 1 TABLET EVERY DAY 90 tablet 4    semaglutide (RYBELSUS) 14 mg tablet Take 1 tablet (14 mg total) by mouth once daily. 90 tablet 3       Social History     Socioeconomic History    Marital status:    Tobacco Use    Smoking status: Never Smoker    Smokeless tobacco: Current User     Types: Chew     Social Determinants of Health     Financial Resource Strain: Low Risk     Difficulty of Paying Living Expenses: Not hard at all   Food Insecurity: No Food Insecurity    Worried About Running Out of Food in the Last Year: Never true    Ran Out of Food in the Last Year: Never true   Transportation Needs: No Transportation Needs    Lack of Transportation (Medical): No    Lack of Transportation (Non-Medical): No   Physical Activity: Sufficiently Active    Days of Exercise per Week: 7 days    Minutes of Exercise per Session: 60 min   Social Connections: Socially Integrated    Frequency of Communication with Friends and Family: More than three times a week    Frequency of Social Gatherings with Friends and Family: Once a week    Attends Hoahaoism Services: More than 4 times per year    Active Member of Clubs or Organizations: No    Attends Club or Organization Meetings: 1 to 4 times per year    Marital Status:    Housing Stability: Unknown    Unable to Pay for Housing in the Last Year: No    Unstable Housing in the Last Year: No        Family History   Problem Relation Age of Onset    Heart disease Mother         Subjective:     ROS      See HPI for details    Constitutional: Denies Change in appetite. Denies Chills. Denies Fever. Denies Night sweats.  Endocrine: Denies Cold intolerance. Denies Excessive thirst. Denies Heat intolerance. Denies Weight loss. Denies Weight gain.  Musculoskeletal: +  "left foot pain,  Denies Weakness.  Integumentary: Denies Rash. Denies Itching. Denies Dry skin.  Neurologic: Denies Dizziness. Denies Fainting. Denies Headache.    All Other ROS: Negative except as stated in HPI.       Objective:     /68   Pulse 98   Temp 98.9 °F (37.2 °C) (Temporal)   Ht 5' 9" (1.753 m)   Wt 105.2 kg (232 lb)   SpO2 98%   BMI 34.26 kg/m²     Physical Exam    General: Alert and oriented, No acute distress.  Head: Normocephalic,   Respiratory: , Symmetrical chest wall expansion.  Musculoskeletal: Normal range of motion.  Integumentary: Warm, Dry callus to bilateral calls of feet  Extremities: No clubbing, cyanosis or edema  Neurologic:see below.  Psychiatric: Normal interaction,Appropriate affect     Diabetic foot exam:   Left: Reflexes 2+    Vibratory sensation diminished   Proprioception diminished   Sharp/dull discrimination diminished   Filament test absent  Right: Reflexes 2+    Vibratory sensation normal   Proprioception normal   Sharp/dull discrimination normal   Filament test present        Assessment:       ICD-10-CM ICD-9-CM   1. Acute foot pain, left  M79.672 729.5   2. Diabetic autonomic neuropathy associated with type 2 diabetes mellitus  E11.43 250.60     337.1        Plan:     1. Acute foot pain, left  - Ambulatory referral/consult to Podiatry; Future    2. Diabetic autonomic neuropathy associated with type 2 diabetes mellitus  - Ambulatory referral/consult to Podiatry; Future  - gabapentin (NEURONTIN) 300 MG capsule; Take 1 capsule (300 mg total) by mouth 3 (three) times daily as needed (neuropathic pain).  Dispense: 90 capsule; Refill: 0    Referral to podiatry for dm foot care/shoes/inserts  Dm foot education done, handout provided   Trial of gabapentin prn; can cause drowsiness        Medication List with Changes/Refills   New Medications    GABAPENTIN (NEURONTIN) 300 MG CAPSULE    Take 1 capsule (300 mg total) by mouth 3 (three) times daily as needed (neuropathic " pain).       Start Date: 7/6/2022  End Date: 7/6/2023   Current Medications    AMLODIPINE (NORVASC) 10 MG TABLET    Take 1 tablet (10 mg total) by mouth once daily.       Start Date: 5/6/2022  End Date: --    ATORVASTATIN (LIPITOR) 40 MG TABLET    Take 1 tablet (40 mg total) by mouth every evening.       Start Date: 5/6/2022  End Date: --    CARVEDILOL (COREG) 6.25 MG TABLET    Take 6.25 mg by mouth.       Start Date: 7/20/2021 End Date: --    COLCHICINE (COLCRYS) 0.6 MG TABLET    1.2 mg p.o. x1 then 0.6 mg p.o. 1 hour later x1       Start Date: 6/27/2022 End Date: --    ERGOCALCIFEROL (ERGOCALCIFEROL) 50,000 UNIT CAP           Start Date: 3/30/2022 End Date: --    FEBUXOSTAT (ULORIC) 40 MG TAB    Take 1 tablet (40 mg total) by mouth once daily.       Start Date: 6/27/2022 End Date: --    FINASTERIDE (PROSCAR) 5 MG TABLET    TAKE 1 TABLET EVERY DAY       Start Date: 6/8/2022  End Date: --    METFORMIN (GLUCOPHAGE) 1000 MG TABLET    TAKE 1 TABLET TWICE DAILY       Start Date: 6/13/2022 End Date: --    METOPROLOL SUCCINATE (TOPROL-XL) 50 MG 24 HR TABLET           Start Date: 2/16/2022 End Date: --    OLMESARTAN-HYDROCHLOROTHIAZIDE (BENICAR HCT) 40-25 MG PER TABLET    TAKE 1 TABLET EVERY DAY       Start Date: 5/21/2022 End Date: 5/21/2023    SEMAGLUTIDE (RYBELSUS) 14 MG TABLET    Take 1 tablet (14 mg total) by mouth once daily.       Start Date: 5/6/2022  End Date: --          Follow up if symptoms worsen or fail to improve.

## 2022-07-06 ENCOUNTER — OFFICE VISIT (OUTPATIENT)
Dept: INTERNAL MEDICINE | Facility: CLINIC | Age: 77
End: 2022-07-06
Payer: MEDICARE

## 2022-07-06 VITALS
TEMPERATURE: 99 F | WEIGHT: 232 LBS | OXYGEN SATURATION: 98 % | HEART RATE: 98 BPM | HEIGHT: 69 IN | SYSTOLIC BLOOD PRESSURE: 138 MMHG | DIASTOLIC BLOOD PRESSURE: 68 MMHG | BODY MASS INDEX: 34.36 KG/M2

## 2022-07-06 DIAGNOSIS — M79.672 ACUTE FOOT PAIN, LEFT: Primary | ICD-10-CM

## 2022-07-06 DIAGNOSIS — E11.43 DIABETIC AUTONOMIC NEUROPATHY ASSOCIATED WITH TYPE 2 DIABETES MELLITUS: ICD-10-CM

## 2022-07-06 PROCEDURE — 3075F SYST BP GE 130 - 139MM HG: CPT | Mod: CPTII,,, | Performed by: NURSE PRACTITIONER

## 2022-07-06 PROCEDURE — 1159F PR MEDICATION LIST DOCUMENTED IN MEDICAL RECORD: ICD-10-PCS | Mod: CPTII,,, | Performed by: NURSE PRACTITIONER

## 2022-07-06 PROCEDURE — 1159F MED LIST DOCD IN RCRD: CPT | Mod: CPTII,,, | Performed by: NURSE PRACTITIONER

## 2022-07-06 PROCEDURE — 3078F DIAST BP <80 MM HG: CPT | Mod: CPTII,,, | Performed by: NURSE PRACTITIONER

## 2022-07-06 PROCEDURE — 1160F PR REVIEW ALL MEDS BY PRESCRIBER/CLIN PHARMACIST DOCUMENTED: ICD-10-PCS | Mod: CPTII,,, | Performed by: NURSE PRACTITIONER

## 2022-07-06 PROCEDURE — 99214 PR OFFICE/OUTPT VISIT, EST, LEVL IV, 30-39 MIN: ICD-10-PCS | Mod: ,,, | Performed by: NURSE PRACTITIONER

## 2022-07-06 PROCEDURE — 1125F AMNT PAIN NOTED PAIN PRSNT: CPT | Mod: CPTII,,, | Performed by: NURSE PRACTITIONER

## 2022-07-06 PROCEDURE — 1160F RVW MEDS BY RX/DR IN RCRD: CPT | Mod: CPTII,,, | Performed by: NURSE PRACTITIONER

## 2022-07-06 PROCEDURE — 3288F PR FALLS RISK ASSESSMENT DOCUMENTED: ICD-10-PCS | Mod: CPTII,,, | Performed by: NURSE PRACTITIONER

## 2022-07-06 PROCEDURE — 99214 OFFICE O/P EST MOD 30 MIN: CPT | Mod: ,,, | Performed by: NURSE PRACTITIONER

## 2022-07-06 PROCEDURE — 3008F PR BODY MASS INDEX (BMI) DOCUMENTED: ICD-10-PCS | Mod: CPTII,,, | Performed by: NURSE PRACTITIONER

## 2022-07-06 PROCEDURE — 3288F FALL RISK ASSESSMENT DOCD: CPT | Mod: CPTII,,, | Performed by: NURSE PRACTITIONER

## 2022-07-06 PROCEDURE — 1101F PT FALLS ASSESS-DOCD LE1/YR: CPT | Mod: CPTII,,, | Performed by: NURSE PRACTITIONER

## 2022-07-06 PROCEDURE — 3061F PR NEG MICROALBUMINURIA RESULT DOCUMENTED/REVIEW: ICD-10-PCS | Mod: CPTII,,, | Performed by: NURSE PRACTITIONER

## 2022-07-06 PROCEDURE — 3061F NEG MICROALBUMINURIA REV: CPT | Mod: CPTII,,, | Performed by: NURSE PRACTITIONER

## 2022-07-06 PROCEDURE — 3066F PR DOCUMENTATION OF TREATMENT FOR NEPHROPATHY: ICD-10-PCS | Mod: CPTII,,, | Performed by: NURSE PRACTITIONER

## 2022-07-06 PROCEDURE — 3075F PR MOST RECENT SYSTOLIC BLOOD PRESS GE 130-139MM HG: ICD-10-PCS | Mod: CPTII,,, | Performed by: NURSE PRACTITIONER

## 2022-07-06 PROCEDURE — 3066F NEPHROPATHY DOC TX: CPT | Mod: CPTII,,, | Performed by: NURSE PRACTITIONER

## 2022-07-06 PROCEDURE — 1101F PR PT FALLS ASSESS DOC 0-1 FALLS W/OUT INJ PAST YR: ICD-10-PCS | Mod: CPTII,,, | Performed by: NURSE PRACTITIONER

## 2022-07-06 PROCEDURE — 3008F BODY MASS INDEX DOCD: CPT | Mod: CPTII,,, | Performed by: NURSE PRACTITIONER

## 2022-07-06 PROCEDURE — 3078F PR MOST RECENT DIASTOLIC BLOOD PRESSURE < 80 MM HG: ICD-10-PCS | Mod: CPTII,,, | Performed by: NURSE PRACTITIONER

## 2022-07-06 PROCEDURE — 1125F PR PAIN SEVERITY QUANTIFIED, PAIN PRESENT: ICD-10-PCS | Mod: CPTII,,, | Performed by: NURSE PRACTITIONER

## 2022-07-06 RX ORDER — GABAPENTIN 300 MG/1
300 CAPSULE ORAL 3 TIMES DAILY PRN
Qty: 90 CAPSULE | Refills: 0 | Status: SHIPPED | OUTPATIENT
Start: 2022-07-06 | End: 2024-03-26

## 2022-08-11 ENCOUNTER — PATIENT OUTREACH (OUTPATIENT)
Dept: ADMINISTRATIVE | Facility: HOSPITAL | Age: 77
End: 2022-08-11
Payer: MEDICARE

## 2022-08-25 ENCOUNTER — DOCUMENTATION ONLY (OUTPATIENT)
Dept: ADMINISTRATIVE | Facility: HOSPITAL | Age: 77
End: 2022-08-25
Payer: MEDICARE

## 2022-08-26 ENCOUNTER — DOCUMENTATION ONLY (OUTPATIENT)
Dept: ADMINISTRATIVE | Facility: HOSPITAL | Age: 77
End: 2022-08-26
Payer: MEDICARE

## 2022-09-19 ENCOUNTER — IMMUNIZATION (OUTPATIENT)
Dept: FAMILY MEDICINE | Facility: CLINIC | Age: 77
End: 2022-09-19
Payer: MEDICARE

## 2022-09-19 DIAGNOSIS — Z23 NEED FOR VACCINATION: Primary | ICD-10-CM

## 2022-09-19 PROCEDURE — 0124A COVID-19, MRNA, LNP-S, BIVALENT BOOSTER, PF, 30 MCG/0.3 ML DOSE: CPT | Mod: CV19,S$GLB,, | Performed by: EMERGENCY MEDICINE

## 2022-09-19 PROCEDURE — 0124A COVID-19, MRNA, LNP-S, BIVALENT BOOSTER, PF, 30 MCG/0.3 ML DOSE: ICD-10-PCS | Mod: CV19,S$GLB,, | Performed by: EMERGENCY MEDICINE

## 2022-09-19 PROCEDURE — 91312 COVID-19, MRNA, LNP-S, BIVALENT BOOSTER, PF, 30 MCG/0.3 ML DOSE: ICD-10-PCS | Mod: S$GLB,,, | Performed by: EMERGENCY MEDICINE

## 2022-09-19 PROCEDURE — 91312 COVID-19, MRNA, LNP-S, BIVALENT BOOSTER, PF, 30 MCG/0.3 ML DOSE: CPT | Mod: S$GLB,,, | Performed by: EMERGENCY MEDICINE

## 2022-10-31 ENCOUNTER — TELEPHONE (OUTPATIENT)
Dept: ADMINISTRATIVE | Facility: HOSPITAL | Age: 77
End: 2022-10-31
Payer: MEDICARE

## 2022-10-31 DIAGNOSIS — E11.9 TYPE 2 DIABETES MELLITUS WITHOUT COMPLICATION, UNSPECIFIED WHETHER LONG TERM INSULIN USE: Primary | ICD-10-CM

## 2022-10-31 NOTE — TELEPHONE ENCOUNTER
----- Message from Gregory Tanner MA sent at 10/31/2022  8:41 AM CDT -----  Regarding: PV 11/7/22 @ 11:00 Dr. Thacker  1. Are there any outstanding tasks in the patient's chart? Yes, fasting    2. Is there any documentation in the chart? No    3.Has patient been seen in an ER, Urgent care clinic, or been admitted since last visit?  If yes, When, where, and why    4. Has patient seen any other healthcare providers since last visit?  If yes, when, where, and why    5. Has patient had any bloodwork or XR done since last visit?    6. Is patient signed up for patient portal?

## 2022-11-03 ENCOUNTER — LAB VISIT (OUTPATIENT)
Dept: LAB | Facility: HOSPITAL | Age: 77
End: 2022-11-03
Attending: INTERNAL MEDICINE
Payer: MEDICARE

## 2022-11-03 DIAGNOSIS — E11.9 TYPE 2 DIABETES MELLITUS WITHOUT COMPLICATION, UNSPECIFIED WHETHER LONG TERM INSULIN USE: ICD-10-CM

## 2022-11-03 DIAGNOSIS — E83.52 HYPERCALCEMIA: ICD-10-CM

## 2022-11-03 LAB
ALBUMIN SERPL-MCNC: 3.8 GM/DL (ref 3.4–4.8)
ALBUMIN/GLOB SERPL: 1 RATIO (ref 1.1–2)
ALP SERPL-CCNC: 114 UNIT/L (ref 40–150)
ALT SERPL-CCNC: 20 UNIT/L (ref 0–55)
AST SERPL-CCNC: 17 UNIT/L (ref 5–34)
BILIRUBIN DIRECT+TOT PNL SERPL-MCNC: 0.6 MG/DL
BUN SERPL-MCNC: 16 MG/DL (ref 8.4–25.7)
CALCIUM SERPL-MCNC: 9.4 MG/DL (ref 8.8–10)
CHLORIDE SERPL-SCNC: 102 MMOL/L (ref 98–107)
CHOLEST SERPL-MCNC: 162 MG/DL
CHOLEST/HDLC SERPL: 3 {RATIO} (ref 0–5)
CO2 SERPL-SCNC: 26 MMOL/L (ref 23–31)
CREAT SERPL-MCNC: 1.42 MG/DL (ref 0.73–1.18)
CREAT UR-MCNC: 65.7 MG/DL (ref 63–166)
EST. AVERAGE GLUCOSE BLD GHB EST-MCNC: 154.2 MG/DL
GFR SERPLBLD CREATININE-BSD FMLA CKD-EPI: 52 MLS/MIN/1.73/M2
GLOBULIN SER-MCNC: 4 GM/DL (ref 2.4–3.5)
GLUCOSE SERPL-MCNC: 176 MG/DL (ref 82–115)
HBA1C MFR BLD: 7 %
HDLC SERPL-MCNC: 53 MG/DL (ref 35–60)
LDLC SERPL CALC-MCNC: 90 MG/DL (ref 50–140)
MICROALBUMIN UR-MCNC: <5 UG/ML
MICROALBUMIN/CREAT RATIO PNL UR: <7.6 MG/GM CR (ref 0–30)
POTASSIUM SERPL-SCNC: 3.9 MMOL/L (ref 3.5–5.1)
PROT SERPL-MCNC: 7.8 GM/DL (ref 5.8–7.6)
SODIUM SERPL-SCNC: 137 MMOL/L (ref 136–145)
TRIGL SERPL-MCNC: 93 MG/DL (ref 34–140)
VLDLC SERPL CALC-MCNC: 19 MG/DL

## 2022-11-03 PROCEDURE — 82043 UR ALBUMIN QUANTITATIVE: CPT

## 2022-11-03 PROCEDURE — 36415 COLL VENOUS BLD VENIPUNCTURE: CPT

## 2022-11-03 PROCEDURE — 80061 LIPID PANEL: CPT

## 2022-11-03 PROCEDURE — 83036 HEMOGLOBIN GLYCOSYLATED A1C: CPT

## 2022-11-03 PROCEDURE — 80053 COMPREHEN METABOLIC PANEL: CPT

## 2022-11-07 ENCOUNTER — OFFICE VISIT (OUTPATIENT)
Dept: INTERNAL MEDICINE | Facility: CLINIC | Age: 77
End: 2022-11-07
Payer: MEDICARE

## 2022-11-07 VITALS
BODY MASS INDEX: 38.41 KG/M2 | DIASTOLIC BLOOD PRESSURE: 84 MMHG | RESPIRATION RATE: 16 BRPM | TEMPERATURE: 98 F | OXYGEN SATURATION: 99 % | HEART RATE: 94 BPM | WEIGHT: 239 LBS | HEIGHT: 66 IN | SYSTOLIC BLOOD PRESSURE: 150 MMHG

## 2022-11-07 DIAGNOSIS — G62.89 OTHER POLYNEUROPATHY: ICD-10-CM

## 2022-11-07 DIAGNOSIS — E11.42 TYPE 2 DIABETES MELLITUS WITH DIABETIC POLYNEUROPATHY, WITHOUT LONG-TERM CURRENT USE OF INSULIN: ICD-10-CM

## 2022-11-07 DIAGNOSIS — D47.2 MONOCLONAL GAMMOPATHY OF UNKNOWN SIGNIFICANCE (MGUS): ICD-10-CM

## 2022-11-07 DIAGNOSIS — D47.2 MGUS (MONOCLONAL GAMMOPATHY OF UNKNOWN SIGNIFICANCE): Primary | ICD-10-CM

## 2022-11-07 DIAGNOSIS — Z23 NEED FOR VACCINATION: ICD-10-CM

## 2022-11-07 DIAGNOSIS — E78.5 TYPE 2 DIABETES MELLITUS WITH HYPERLIPIDEMIA: ICD-10-CM

## 2022-11-07 DIAGNOSIS — E11.69 TYPE 2 DIABETES MELLITUS WITH HYPERLIPIDEMIA: ICD-10-CM

## 2022-11-07 PROBLEM — G62.9 PERIPHERAL NEUROPATHY: Status: ACTIVE | Noted: 2022-11-07

## 2022-11-07 PROCEDURE — 1101F PR PT FALLS ASSESS DOC 0-1 FALLS W/OUT INJ PAST YR: ICD-10-PCS | Mod: CPTII,,, | Performed by: INTERNAL MEDICINE

## 2022-11-07 PROCEDURE — 3066F NEPHROPATHY DOC TX: CPT | Mod: CPTII,,, | Performed by: INTERNAL MEDICINE

## 2022-11-07 PROCEDURE — 90694 VACC AIIV4 NO PRSRV 0.5ML IM: CPT | Mod: ,,, | Performed by: INTERNAL MEDICINE

## 2022-11-07 PROCEDURE — 1159F PR MEDICATION LIST DOCUMENTED IN MEDICAL RECORD: ICD-10-PCS | Mod: CPTII,,, | Performed by: INTERNAL MEDICINE

## 2022-11-07 PROCEDURE — 1159F MED LIST DOCD IN RCRD: CPT | Mod: CPTII,,, | Performed by: INTERNAL MEDICINE

## 2022-11-07 PROCEDURE — 1160F PR REVIEW ALL MEDS BY PRESCRIBER/CLIN PHARMACIST DOCUMENTED: ICD-10-PCS | Mod: CPTII,,, | Performed by: INTERNAL MEDICINE

## 2022-11-07 PROCEDURE — 99214 PR OFFICE/OUTPT VISIT, EST, LEVL IV, 30-39 MIN: ICD-10-PCS | Mod: ,,, | Performed by: INTERNAL MEDICINE

## 2022-11-07 PROCEDURE — 3061F NEG MICROALBUMINURIA REV: CPT | Mod: CPTII,,, | Performed by: INTERNAL MEDICINE

## 2022-11-07 PROCEDURE — 3077F PR MOST RECENT SYSTOLIC BLOOD PRESSURE >= 140 MM HG: ICD-10-PCS | Mod: CPTII,,, | Performed by: INTERNAL MEDICINE

## 2022-11-07 PROCEDURE — 3288F FALL RISK ASSESSMENT DOCD: CPT | Mod: CPTII,,, | Performed by: INTERNAL MEDICINE

## 2022-11-07 PROCEDURE — 3077F SYST BP >= 140 MM HG: CPT | Mod: CPTII,,, | Performed by: INTERNAL MEDICINE

## 2022-11-07 PROCEDURE — G0008 ADMIN INFLUENZA VIRUS VAC: HCPCS | Mod: ,,, | Performed by: INTERNAL MEDICINE

## 2022-11-07 PROCEDURE — 1101F PT FALLS ASSESS-DOCD LE1/YR: CPT | Mod: CPTII,,, | Performed by: INTERNAL MEDICINE

## 2022-11-07 PROCEDURE — 3288F PR FALLS RISK ASSESSMENT DOCUMENTED: ICD-10-PCS | Mod: CPTII,,, | Performed by: INTERNAL MEDICINE

## 2022-11-07 PROCEDURE — 3066F PR DOCUMENTATION OF TREATMENT FOR NEPHROPATHY: ICD-10-PCS | Mod: CPTII,,, | Performed by: INTERNAL MEDICINE

## 2022-11-07 PROCEDURE — 3079F DIAST BP 80-89 MM HG: CPT | Mod: CPTII,,, | Performed by: INTERNAL MEDICINE

## 2022-11-07 PROCEDURE — 99214 OFFICE O/P EST MOD 30 MIN: CPT | Mod: ,,, | Performed by: INTERNAL MEDICINE

## 2022-11-07 PROCEDURE — 3061F PR NEG MICROALBUMINURIA RESULT DOCUMENTED/REVIEW: ICD-10-PCS | Mod: CPTII,,, | Performed by: INTERNAL MEDICINE

## 2022-11-07 PROCEDURE — 1160F RVW MEDS BY RX/DR IN RCRD: CPT | Mod: CPTII,,, | Performed by: INTERNAL MEDICINE

## 2022-11-07 PROCEDURE — 90694 FLU VACCINE - QUADRIVALENT - ADJUVANTED: ICD-10-PCS | Mod: ,,, | Performed by: INTERNAL MEDICINE

## 2022-11-07 PROCEDURE — G0008 FLU VACCINE - QUADRIVALENT - ADJUVANTED: ICD-10-PCS | Mod: ,,, | Performed by: INTERNAL MEDICINE

## 2022-11-07 PROCEDURE — 3079F PR MOST RECENT DIASTOLIC BLOOD PRESSURE 80-89 MM HG: ICD-10-PCS | Mod: CPTII,,, | Performed by: INTERNAL MEDICINE

## 2022-11-07 NOTE — PROGRESS NOTES
Subjective:      Patient ID: Galdino Joshi is a 77 y.o. male.    Chief Complaint: Diabetes (6 month FU)      HPI:  77 y.o. male for diabetic revisit  He has never had a colonoscopy and does not want a colonoscopy right now.   Dr. Sharp- urology for BPH, PSA. Last PSA at 7.4; goes back to see him on   Dr. Adorno- cardiologist; hasn't been in a couple years  Dr. Dawkins- IGG monoclonal gammopathy; globulin 4.  Of 4.  Tells me he has not been for recent appointment  UTD with pneumonia vaccines, flu vaccine,  declines shingles vaccine from pharmacy  Covid vaccinated  Mom  at 32 from alcohol use; Dad  at 72 with unknown medical problems; no siblings  Flu shot   Has been doing poor with diet, eating [employer], weight is up 7 lb since last visit A1c of 7 in fasting sugar of 176 creatinine is also worse at 1.42, total protein at 7.8 and globulin      Past Medical History:  Past Medical History:   Diagnosis Date    BPH (benign prostatic hyperplasia)     Essential (primary) hypertension     Mixed hyperlipidemia     Proteinuria     Type 2 diabetes mellitus without complication, without long-term current use of insulin      Past Surgical History:   Procedure Laterality Date    CARPAL TUNNEL RELEASE N/A     CARPAL TUNNEL RELEASE N/A     neuroplasty median nerve at carpal tunnel  Right     RELEASE OF ULNAR NERVE AT CUBITAL TUNNEL Right     SPINAL FUSION       Review of patient's allergies indicates:  No Known Allergies  Current Outpatient Medications on File Prior to Visit   Medication Sig Dispense Refill    amLODIPine (NORVASC) 10 MG tablet Take 1 tablet (10 mg total) by mouth once daily. 90 tablet 3    atorvastatin (LIPITOR) 40 MG tablet Take 1 tablet (40 mg total) by mouth every evening. 90 tablet 3    colchicine (COLCRYS) 0.6 mg tablet 1.2 mg p.o. x1 then 0.6 mg p.o. 1 hour later x1 3 tablet 4    ergocalciferol (ERGOCALCIFEROL) 50,000 unit Cap TAKE 1 CAPSULE EVERY WEEK 13 capsule 4     febuxostat (ULORIC) 40 mg Tab Take 1 tablet (40 mg total) by mouth once daily. 90 tablet 3    finasteride (PROSCAR) 5 mg tablet TAKE 1 TABLET EVERY DAY 90 tablet 4    gabapentin (NEURONTIN) 300 MG capsule Take 1 capsule (300 mg total) by mouth 3 (three) times daily as needed (neuropathic pain). 90 capsule 0    metFORMIN (GLUCOPHAGE) 1000 MG tablet TAKE 1 TABLET TWICE DAILY 180 tablet 4    metoprolol succinate (TOPROL-XL) 50 MG 24 hr tablet TAKE 1 TABLET EVERY DAY 90 tablet 3    olmesartan-hydrochlorothiazide (BENICAR HCT) 40-25 mg per tablet TAKE 1 TABLET EVERY DAY 90 tablet 4    semaglutide (RYBELSUS) 14 mg tablet Take 1 tablet (14 mg total) by mouth once daily. 90 tablet 3     No current facility-administered medications on file prior to visit.     Social History     Socioeconomic History    Marital status:    Tobacco Use    Smoking status: Never    Smokeless tobacco: Current     Types: Chew   Substance and Sexual Activity    Alcohol use: Yes     Alcohol/week: 35.0 standard drinks     Types: 35 Cans of beer per week    Drug use: Never    Sexual activity: Not Currently     Social Determinants of Health     Financial Resource Strain: Low Risk     Difficulty of Paying Living Expenses: Not hard at all   Food Insecurity: No Food Insecurity    Worried About Running Out of Food in the Last Year: Never true    Ran Out of Food in the Last Year: Never true   Transportation Needs: No Transportation Needs    Lack of Transportation (Medical): No    Lack of Transportation (Non-Medical): No   Physical Activity: Sufficiently Active    Days of Exercise per Week: 7 days    Minutes of Exercise per Session: 60 min   Social Connections: Socially Integrated    Frequency of Communication with Friends and Family: More than three times a week    Frequency of Social Gatherings with Friends and Family: Once a week    Attends Islam Services: More than 4 times per year    Active Member of Clubs or Organizations: No    Attends Club  "or Organization Meetings: 1 to 4 times per year    Marital Status:    Housing Stability: Unknown    Unable to Pay for Housing in the Last Year: No    Unstable Housing in the Last Year: No     Family History   Problem Relation Age of Onset    Heart disease Mother        Review of Systems  A comprehensive review of systems was performed and was negative with exception of what is documented above.     Objective:   BP (!) 150/84 (BP Location: Left arm, Patient Position: Sitting, BP Method: Medium (Manual))   Pulse 94   Temp 98.3 °F (36.8 °C) (Temporal)   Resp 16   Ht 5' 6" (1.676 m)   Wt 108.4 kg (239 lb)   SpO2 99%   BMI 38.58 kg/m²   Physical Exam  General : Alert and oriented, No acute distress, afebrile. Obese  Eye : PERRLA. EOMI. Normal conjunctiva, Sclerae are nonicteric.  Respiratory : Respirations are non-labored and clear to auscultation bilaterally. Symmetrical air entry bilaterally, no crackles, no wheezes, no rhonchi. No cyanosis, no clubbing.  Cardiovascular : Normal rate, Regular rhythm. No murmurs, rubs, or gallops. Pulses are 2+ throughout. No JVD. No Edema.  Gastrointestinal : Soft, nontender, non-distended, bowel sounds are present in all quadrants, no organomegaly, no guarding, no rebound.  Foot exam:  Decreased sensation to monofilament and light touch mostly to the 1st and 2nd toes of the left foot  Musculoskeletal : Normal range of motion throughout. No muscle tenderness.  Integumentary : Warm, moist, intact.  Neurologic : Alert, Oriented  Psychiatric : Cooperative, Appropriate mood & affect.   Assessment/ Plan:   1. MGUS (monoclonal gammopathy of unknown significance)  -     Ambulatory referral/consult to Hematology / Oncology; Future; Expected date: 11/14/2022  -     Hemoglobin A1C; Future; Expected date: 02/07/2023  -     Comprehensive Metabolic Panel; Future; Expected date: 02/07/2023  -     Lipid Panel; Future; Expected date: 02/07/2023  -     Microalbumin/Creatinine Ratio, " Urine; Future; Expected date: 02/07/2023    2. Other polyneuropathy  -     Hemoglobin A1C; Future; Expected date: 02/07/2023  -     Comprehensive Metabolic Panel; Future; Expected date: 02/07/2023  -     Lipid Panel; Future; Expected date: 02/07/2023  -     Microalbumin/Creatinine Ratio, Urine; Future; Expected date: 02/07/2023    3. Type 2 diabetes mellitus with diabetic polyneuropathy, without long-term current use of insulin  Assessment & Plan:  Patient with worsening neuropathy, renal function is worse.  Discussed with patient the severity of the situation and the need for him to make some drastic changes to his diet.  He is on maximum dose of Rybelsus and Metformin; if no improvement we will need to consider discontinuing GLP1 and going to [place] or [place].   RTC 3 months for diabetic revisit  Start B12, Vitamin D    Orders:  -     Hemoglobin A1C; Future; Expected date: 02/07/2023  -     Comprehensive Metabolic Panel; Future; Expected date: 02/07/2023  -     Lipid Panel; Future; Expected date: 02/07/2023  -     Microalbumin/Creatinine Ratio, Urine; Future; Expected date: 02/07/2023    4. Need for vaccination  -     Influenza - Quadrivalent (Adjuvanted)    5. Type 2 diabetes mellitus with hyperlipidemia  Assessment & Plan:  Patient with worsening neuropathy, renal function is worse.  Discussed with patient the severity of the situation and the need for him to make some drastic changes to his diet.  He is on maximum dose of Rybelsus and Metformin; if no improvement we will need to consider discontinuing GLP1 and going to [place] or [place].   RTC 3 months for diabetic revisit  Start B12, Vitamin D      6. Monoclonal gammopathy of unknown significance (MGUS)  Overview:  Followed by Dr. Dawkins    Assessment & Plan:  Referral sent back to Dr Dawkins for revisit             Follow up in about 3 months (around 2/7/2023) for DIABETIC REVISIT, with labs prior to visit.

## 2022-11-07 NOTE — ASSESSMENT & PLAN NOTE
Patient with worsening neuropathy, renal function is worse.  Discussed with patient the severity of the situation and the need for him to make some drastic changes to his diet.  He is on maximum dose of Rybelsus and Metformin; if no improvement we will need to consider discontinuing GLP1 and going to [place] or [place].   RTC 3 months for diabetic revisit  Start B12, Vitamin D

## 2022-11-15 ENCOUNTER — OFFICE VISIT (OUTPATIENT)
Dept: HEMATOLOGY/ONCOLOGY | Facility: CLINIC | Age: 77
End: 2022-11-15
Payer: MEDICARE

## 2022-11-15 ENCOUNTER — TELEPHONE (OUTPATIENT)
Dept: HEMATOLOGY/ONCOLOGY | Facility: CLINIC | Age: 77
End: 2022-11-15

## 2022-11-15 VITALS
BODY MASS INDEX: 35.25 KG/M2 | TEMPERATURE: 98 F | HEIGHT: 69 IN | OXYGEN SATURATION: 96 % | WEIGHT: 238 LBS | HEART RATE: 92 BPM | SYSTOLIC BLOOD PRESSURE: 163 MMHG | DIASTOLIC BLOOD PRESSURE: 69 MMHG

## 2022-11-15 DIAGNOSIS — D47.2 MGUS (MONOCLONAL GAMMOPATHY OF UNKNOWN SIGNIFICANCE): ICD-10-CM

## 2022-11-15 LAB
ALBUMIN SERPL-MCNC: 4.3 GM/DL (ref 3.4–4.8)
ALBUMIN/GLOB SERPL: 1.1 RATIO (ref 1.1–2)
ALP SERPL-CCNC: 116 UNIT/L (ref 40–150)
ALT SERPL-CCNC: 22 UNIT/L (ref 0–55)
AST SERPL-CCNC: 18 UNIT/L (ref 5–34)
BASOPHILS # BLD AUTO: 0.01 X10(3)/MCL (ref 0–0.2)
BASOPHILS NFR BLD AUTO: 0.2 %
BILIRUBIN DIRECT+TOT PNL SERPL-MCNC: 0.9 MG/DL
BUN SERPL-MCNC: 11.8 MG/DL (ref 8.4–25.7)
CALCIUM SERPL-MCNC: 10.5 MG/DL (ref 8.8–10)
CHLORIDE SERPL-SCNC: 101 MMOL/L (ref 98–107)
CO2 SERPL-SCNC: 27 MMOL/L (ref 23–31)
CREAT SERPL-MCNC: 1.25 MG/DL (ref 0.73–1.18)
EOSINOPHIL # BLD AUTO: 0.06 X10(3)/MCL (ref 0–0.9)
EOSINOPHIL NFR BLD AUTO: 1.1 %
ERYTHROCYTE [DISTWIDTH] IN BLOOD BY AUTOMATED COUNT: 12.5 % (ref 11.5–17)
GFR SERPLBLD CREATININE-BSD FMLA CKD-EPI: 60 MLS/MIN/1.73/M2
GLOBULIN SER-MCNC: 4 GM/DL (ref 2.4–3.5)
GLUCOSE SERPL-MCNC: 101 MG/DL (ref 82–115)
HCT VFR BLD AUTO: 47.7 % (ref 42–52)
HGB BLD-MCNC: 15.1 GM/DL (ref 14–18)
IGA SERPL-MCNC: 192 MG/DL (ref 101–645)
IGG SERPL-MCNC: 1847 MG/DL (ref 540–1822)
IGM SERPL-MCNC: 441 MG/DL (ref 22–240)
IMM GRANULOCYTES # BLD AUTO: 0.01 X10(3)/MCL (ref 0–0.04)
IMM GRANULOCYTES NFR BLD AUTO: 0.2 %
LYMPHOCYTES # BLD AUTO: 1.98 X10(3)/MCL (ref 0.6–4.6)
LYMPHOCYTES NFR BLD AUTO: 35.4 %
MCH RBC QN AUTO: 30.4 PG (ref 27–31)
MCHC RBC AUTO-ENTMCNC: 31.7 MG/DL (ref 33–36)
MCV RBC AUTO: 96.2 FL (ref 80–94)
MONOCYTES # BLD AUTO: 0.5 X10(3)/MCL (ref 0.1–1.3)
MONOCYTES NFR BLD AUTO: 8.9 %
NEUTROPHILS # BLD AUTO: 3 X10(3)/MCL (ref 2.1–9.2)
NEUTROPHILS NFR BLD AUTO: 54.2 %
PLATELET # BLD AUTO: 284 X10(3)/MCL (ref 130–400)
PMV BLD AUTO: 8.2 FL (ref 7.4–10.4)
POTASSIUM SERPL-SCNC: 4 MMOL/L (ref 3.5–5.1)
PROT SERPL-MCNC: 8.3 GM/DL (ref 5.8–7.6)
RBC # BLD AUTO: 4.96 X10(6)/MCL (ref 4.7–6.1)
SODIUM SERPL-SCNC: 136 MMOL/L (ref 136–145)
WBC # SPEC AUTO: 5.6 X10(3)/MCL (ref 4.5–11.5)

## 2022-11-15 PROCEDURE — 80053 COMPREHEN METABOLIC PANEL: CPT | Performed by: INTERNAL MEDICINE

## 2022-11-15 PROCEDURE — 99999 PR PBB SHADOW E&M-EST. PATIENT-LVL IV: CPT | Mod: PBBFAC,,, | Performed by: INTERNAL MEDICINE

## 2022-11-15 PROCEDURE — 1159F PR MEDICATION LIST DOCUMENTED IN MEDICAL RECORD: ICD-10-PCS | Mod: CPTII,S$GLB,, | Performed by: INTERNAL MEDICINE

## 2022-11-15 PROCEDURE — 83521 IG LIGHT CHAINS FREE EACH: CPT | Performed by: INTERNAL MEDICINE

## 2022-11-15 PROCEDURE — 3288F FALL RISK ASSESSMENT DOCD: CPT | Mod: CPTII,S$GLB,, | Performed by: INTERNAL MEDICINE

## 2022-11-15 PROCEDURE — 3066F NEPHROPATHY DOC TX: CPT | Mod: CPTII,S$GLB,, | Performed by: INTERNAL MEDICINE

## 2022-11-15 PROCEDURE — 85025 COMPLETE CBC W/AUTO DIFF WBC: CPT | Performed by: INTERNAL MEDICINE

## 2022-11-15 PROCEDURE — 3077F SYST BP >= 140 MM HG: CPT | Mod: CPTII,S$GLB,, | Performed by: INTERNAL MEDICINE

## 2022-11-15 PROCEDURE — 3061F PR NEG MICROALBUMINURIA RESULT DOCUMENTED/REVIEW: ICD-10-PCS | Mod: CPTII,S$GLB,, | Performed by: INTERNAL MEDICINE

## 2022-11-15 PROCEDURE — 3061F NEG MICROALBUMINURIA REV: CPT | Mod: CPTII,S$GLB,, | Performed by: INTERNAL MEDICINE

## 2022-11-15 PROCEDURE — 3077F PR MOST RECENT SYSTOLIC BLOOD PRESSURE >= 140 MM HG: ICD-10-PCS | Mod: CPTII,S$GLB,, | Performed by: INTERNAL MEDICINE

## 2022-11-15 PROCEDURE — 84165 PROTEIN E-PHORESIS SERUM: CPT | Performed by: INTERNAL MEDICINE

## 2022-11-15 PROCEDURE — 1160F RVW MEDS BY RX/DR IN RCRD: CPT | Mod: CPTII,S$GLB,, | Performed by: INTERNAL MEDICINE

## 2022-11-15 PROCEDURE — 1126F PR PAIN SEVERITY QUANTIFIED, NO PAIN PRESENT: ICD-10-PCS | Mod: CPTII,S$GLB,, | Performed by: INTERNAL MEDICINE

## 2022-11-15 PROCEDURE — 1160F PR REVIEW ALL MEDS BY PRESCRIBER/CLIN PHARMACIST DOCUMENTED: ICD-10-PCS | Mod: CPTII,S$GLB,, | Performed by: INTERNAL MEDICINE

## 2022-11-15 PROCEDURE — 3078F PR MOST RECENT DIASTOLIC BLOOD PRESSURE < 80 MM HG: ICD-10-PCS | Mod: CPTII,S$GLB,, | Performed by: INTERNAL MEDICINE

## 2022-11-15 PROCEDURE — 99205 OFFICE O/P NEW HI 60 MIN: CPT | Mod: S$GLB,,, | Performed by: INTERNAL MEDICINE

## 2022-11-15 PROCEDURE — 1101F PR PT FALLS ASSESS DOC 0-1 FALLS W/OUT INJ PAST YR: ICD-10-PCS | Mod: CPTII,S$GLB,, | Performed by: INTERNAL MEDICINE

## 2022-11-15 PROCEDURE — 1159F MED LIST DOCD IN RCRD: CPT | Mod: CPTII,S$GLB,, | Performed by: INTERNAL MEDICINE

## 2022-11-15 PROCEDURE — 99999 PR PBB SHADOW E&M-EST. PATIENT-LVL IV: ICD-10-PCS | Mod: PBBFAC,,, | Performed by: INTERNAL MEDICINE

## 2022-11-15 PROCEDURE — 99205 PR OFFICE/OUTPT VISIT, NEW, LEVL V, 60-74 MIN: ICD-10-PCS | Mod: S$GLB,,, | Performed by: INTERNAL MEDICINE

## 2022-11-15 PROCEDURE — 36415 COLL VENOUS BLD VENIPUNCTURE: CPT | Performed by: INTERNAL MEDICINE

## 2022-11-15 PROCEDURE — 1126F AMNT PAIN NOTED NONE PRSNT: CPT | Mod: CPTII,S$GLB,, | Performed by: INTERNAL MEDICINE

## 2022-11-15 PROCEDURE — 1101F PT FALLS ASSESS-DOCD LE1/YR: CPT | Mod: CPTII,S$GLB,, | Performed by: INTERNAL MEDICINE

## 2022-11-15 PROCEDURE — 3066F PR DOCUMENTATION OF TREATMENT FOR NEPHROPATHY: ICD-10-PCS | Mod: CPTII,S$GLB,, | Performed by: INTERNAL MEDICINE

## 2022-11-15 PROCEDURE — 82784 ASSAY IGA/IGD/IGG/IGM EACH: CPT | Performed by: INTERNAL MEDICINE

## 2022-11-15 PROCEDURE — 3078F DIAST BP <80 MM HG: CPT | Mod: CPTII,S$GLB,, | Performed by: INTERNAL MEDICINE

## 2022-11-15 PROCEDURE — 3288F PR FALLS RISK ASSESSMENT DOCUMENTED: ICD-10-PCS | Mod: CPTII,S$GLB,, | Performed by: INTERNAL MEDICINE

## 2022-11-15 NOTE — PROGRESS NOTES
HEMATOLOGY/ONCOLOGY OFFICE CLINIC VISIT    Visit Information:    Initial Evaluation: 8/30/2018, 11/15/2022  Referring Provider: Dr Thacker  Other providers:Dr Sharp  Code status:Not addressed    Diagnosis:  MGUS    Present treatment:    Treatment/Oncology history:    Plan of care: MM w/u    Imaging:  Skeletal Survey 8/31/2018: No suspicious osseous lesions are identified radiographically.      Pathology:      CLINICAL HISTORY:       Patient: Galdino Joshi is a 77 y.o. male  kindly referred by Dr. Thacker for evaluation of gammopathy. I saw him back on 8/30/2018 but lost followup. He did not return to the clinic. At the time, patient had routine blood work revealed increase in protein levels and further studies showed M-spikes in the gamma region. The monoclonal protein peaks collectively account for 1.68 g/dL of the total 1.92 g/dL of protein in the gamma region. JAILYN pattern shows IgG type lambda and IgM type kappa biclonal proteins.    Repeated labs 8/30/2018:  Immunofixation shows IgM monoclonal protein with kappa light chain specificity.  Immunofixation shows IgG monoclonal protein with lambda light chain specificity.  Two spikes observed.  0.3, 0.9    IgM   434  IgG   1571  IgA    146    Kappa 28.6  Lambda 38.6  K/L ratio 0.74    Patient also with elevated PSA for which has been follow since 2015 when he started with synmptoms of enlarge prostate.       Other than that he is doing well and does not have any complaints at all. He is here with his wife.   He denies any fever, chills, sweats. No chest pain or shortness of breath. Occ heaturia. No blood clots. No family history of blood disorders.          Chief Complaint: NP (Referral from Dr. Francesco Salomon for MGUS.)      Interval History:        Past Medical History:   Diagnosis Date    BPH (benign prostatic hyperplasia)     Essential (primary) hypertension     Mixed hyperlipidemia     Proteinuria     Type 2 diabetes mellitus without complication,  without long-term current use of insulin       Past Surgical History:   Procedure Laterality Date    CARPAL TUNNEL RELEASE N/A     CARPAL TUNNEL RELEASE N/A     neuroplasty median nerve at carpal tunnel  Right     RELEASE OF ULNAR NERVE AT CUBITAL TUNNEL Right     SPINAL FUSION       Family History   Problem Relation Age of Onset    Heart disease Mother      Social Connections: Socially Integrated    Frequency of Communication with Friends and Family: More than three times a week    Frequency of Social Gatherings with Friends and Family: Once a week    Attends [employer] Services: More than 4 times per year    Active Member of Clubs or Organizations: No    Attends Club or Organization Meetings: 1 to 4 times per year    Marital Status:        Review of patient's allergies indicates:  No Known Allergies   Current Outpatient Medications on File Prior to Visit   Medication Sig Dispense Refill    amLODIPine (NORVASC) 10 MG tablet Take 1 tablet (10 mg total) by mouth once daily. 90 tablet 3    atorvastatin (LIPITOR) 40 MG tablet Take 1 tablet (40 mg total) by mouth every evening. 90 tablet 3    colchicine (COLCRYS) 0.6 mg tablet 1.2 mg p.o. x1 then 0.6 mg p.o. 1 hour later x1 3 tablet 4    ergocalciferol (ERGOCALCIFEROL) 50,000 unit Cap TAKE 1 CAPSULE EVERY WEEK 13 capsule 4    febuxostat (ULORIC) 40 mg Tab Take 1 tablet (40 mg total) by mouth once daily. 90 tablet 3    finasteride (PROSCAR) 5 mg tablet TAKE 1 TABLET EVERY DAY 90 tablet 4    gabapentin (NEURONTIN) 300 MG capsule Take 1 capsule (300 mg total) by mouth 3 (three) times daily as needed (neuropathic pain). 90 capsule 0    metFORMIN (GLUCOPHAGE) 1000 MG tablet TAKE 1 TABLET TWICE DAILY 180 tablet 4    metoprolol succinate (TOPROL-XL) 50 MG 24 hr tablet TAKE 1 TABLET EVERY DAY 90 tablet 3    olmesartan-hydrochlorothiazide (BENICAR HCT) 40-25 mg per tablet TAKE 1 TABLET EVERY DAY 90 tablet 4    semaglutide (RYBELSUS) 14 mg tablet Take 1 tablet (14 mg total)  "by mouth once daily. 90 tablet 3     No current facility-administered medications on file prior to visit.      Review of Systems   Constitutional:  Negative for activity change, appetite change, chills, diaphoresis, fatigue, fever and unexpected weight change.   HENT:  Negative for nasal congestion, mouth sores, nosebleeds, postnasal drip, sinus pressure/congestion, sore throat and trouble swallowing.    Eyes:  Negative for visual disturbance.   Cardiovascular:  Negative for chest pain and palpitations.   Gastrointestinal:  Negative for abdominal distention, abdominal pain, blood in stool, change in bowel habit, constipation, diarrhea, nausea, vomiting and change in bowel habit.   Endocrine: Negative.    Genitourinary:  Negative for bladder incontinence, decreased urine volume, difficulty urinating, dysuria, frequency, hematuria, scrotal swelling, testicular pain and urgency.   Musculoskeletal:  Negative for arthralgias, back pain, gait problem, joint swelling, leg pain, myalgias and neck pain.   Integumentary:  Negative for rash.   Neurological:  Negative for dizziness, tremors, seizures, syncope, speech difficulty, weakness, light-headedness, numbness, headaches and memory loss.   Hematological:  Does not bruise/bleed easily.   Psychiatric/Behavioral:  Negative for agitation, confusion, hallucinations, sleep disturbance and suicidal ideas. The patient is not nervous/anxious.             Vitals:    11/15/22 1306   BP: (!) 163/69   BP Location: Right arm   Patient Position: Sitting   Pulse: 92   Temp: 98.3 °F (36.8 °C)   TempSrc: Oral   SpO2: 96%   Weight: 108 kg (238 lb)   Height: 5' 9" (1.753 m)      Physical Exam  Vitals and nursing note reviewed.   Constitutional:       General: He is not in acute distress.     Appearance: Normal appearance.   HENT:      Head: Normocephalic and atraumatic.      Mouth/Throat:      Mouth: Mucous membranes are moist.   Eyes:      General: No scleral icterus.     Extraocular " Movements: Extraocular movements intact.      Conjunctiva/sclera: Conjunctivae normal.   Neck:      Vascular: No JVD.   Cardiovascular:      Rate and Rhythm: Normal rate and regular rhythm.      Heart sounds: No murmur heard.  Pulmonary:      Effort: Pulmonary effort is normal.      Breath sounds: Normal breath sounds. No wheezing or rhonchi.   Abdominal:      General: Bowel sounds are normal. There is no distension.      Palpations: Abdomen is soft.      Tenderness: There is no abdominal tenderness.   Musculoskeletal:         General: No swelling or deformity.      Cervical back: Neck supple.   Lymphadenopathy:      Head:      Right side of head: No submandibular adenopathy.      Left side of head: No submandibular adenopathy.      Cervical: No cervical adenopathy.      Upper Body:      Right upper body: No supraclavicular or axillary adenopathy.      Left upper body: No supraclavicular or axillary adenopathy.      Lower Body: No right inguinal adenopathy. No left inguinal adenopathy.   Skin:     General: Skin is warm.      Coloration: Skin is not jaundiced.      Findings: No rash.   Neurological:      General: No focal deficit present.      Mental Status: He is alert and oriented to person, place, and time.      Cranial Nerves: Cranial nerves 2-12 are intact.   Psychiatric:         Attention and Perception: Attention normal.         Behavior: Behavior is cooperative.     ECOG SCORE    0 - Fully active-able to carry on all pre-disease performance without restriction         Laboratory:  CBC with Differential:  Lab Results   Component Value Date    WBC 6.2 05/03/2022    RBC 4.92 05/03/2022    HGB 14.8 05/03/2022    HCT 46.7 05/03/2022    MCV 94.9 (H) 05/03/2022    MCH 30.1 05/03/2022    MCHC 31.7 (L) 05/03/2022    RDW 13.6 05/03/2022     05/03/2022    MPV 8.2 (L) 05/03/2022        CMP:  Sodium Level   Date Value Ref Range Status   11/03/2022 137 136 - 145 mmol/L Final     Potassium Level   Date Value Ref  Range Status   11/03/2022 3.9 3.5 - 5.1 mmol/L Final     Carbon Dioxide   Date Value Ref Range Status   11/03/2022 26 23 - 31 mmol/L Final     Blood Urea Nitrogen   Date Value Ref Range Status   11/03/2022 16.0 8.4 - 25.7 mg/dL Final     Creatinine   Date Value Ref Range Status   11/03/2022 1.42 (H) 0.73 - 1.18 mg/dL Final     Calcium Level Total   Date Value Ref Range Status   11/03/2022 9.4 8.8 - 10.0 mg/dL Final     Albumin Level   Date Value Ref Range Status   11/03/2022 3.8 3.4 - 4.8 gm/dL Final     Bilirubin Total   Date Value Ref Range Status   11/03/2022 0.6 <=1.5 mg/dL Final     Alkaline Phosphatase   Date Value Ref Range Status   11/03/2022 114 40 - 150 unit/L Final     Aspartate Aminotransferase   Date Value Ref Range Status   11/03/2022 17 5 - 34 unit/L Final     Alanine Aminotransferase   Date Value Ref Range Status   11/03/2022 20 0 - 55 unit/L Final     Estimated GFR-Non    Date Value Ref Range Status   05/03/2022 >60 mls/min/1.73/m2 Final             Assessment:       1. MGUS (monoclonal gammopathy of unknown significance)              Plan:       Again, Discussed with the patient today that Multiple myeloma is a type of bone marrow cancer that forms in a type of white blood cell called a plasma cell. Plasma cells help you fight infections by making antibodies that recognize and attack germs but when they transformed into cancer cells then they are not efficient and don't work well. This cells can affect not only the bone marrow but the bones in general causing distinctive bone lesions called Lytic lesions. It also can affect the kidney. Discussed prognosis and treatment recomendations and indications.  Discussed Monoclonal gammopathy of undetermined significance (MGUS) and that is the most common of a spectrum of diseases called plasma cell dyscrasias. The term MGUS denotes the presence of a monoclonal immunoglobulin (Ig), or M-protein, in the serum or urine in persons without  evidence of multiple myeloma (MM), Waldenström macroglobulinemia (WM), amyloidosis (AL) or other lymphoproliferative disorders. The risk of progression to MM or other lymphoproliferative disorder is present at a constant rate throughout the remainder of a patient's life. This risk has been quantified at 1% per year.  I also discussed with the patient that elevated globulin can be associated with autoimmune disorders or recent infections/inflammation.  This is a disorder of the immune system so anything that affect immune system can increase the globulin level.     MM workup today  Skeletal survey  RTC 2 weeks to discuss results  Bone marrow biopsy will be defer for now but may need in the future.  The patient was given ample opportunity to ask questions and they were all answered to satisfaction; patient demonstrated understanding of what we discussed and is agreeable to the plan.      I'd like to thank Dr Thacker for referring and allowing me the opportunity to participate in the care of this patient and if any questions, please do not hesitate to call the office at [phone].       SRI TAYLOR MD

## 2022-11-16 LAB
KAPPA LC FREE SER-MCNC: 2.66 MG/DL (ref 0.33–1.94)
KAPPA LC FREE/LAMBDA FREE SER: 0.79 {RATIO} (ref 0.26–1.65)
LAMBDA LC FREE SERPL-MCNC: 3.36 MG/DL (ref 0.57–2.63)

## 2022-11-17 ENCOUNTER — APPOINTMENT (OUTPATIENT)
Dept: LAB | Facility: HOSPITAL | Age: 77
End: 2022-11-17
Attending: INTERNAL MEDICINE
Payer: MEDICARE

## 2022-11-17 LAB
ALBUMIN % SPEP (OHS): 45.87 (ref 48.1–59.5)
ALBUMIN SERPL-MCNC: 3.7 GM/DL (ref 3.4–4.8)
ALBUMIN/GLOB SERPL: 0.8 RATIO (ref 1.1–2)
ALPHA 1 GLOB (OHS): 0.26 GM/DL (ref 0–0.4)
ALPHA 1 GLOB% (OHS): 3.27 (ref 2.3–4.9)
ALPHA 2 GLOB % (OHS): 10.4 (ref 6.9–13)
ALPHA 2 GLOB (OHS): 0.84 GM/DL (ref 0.4–1)
BETA GLOB (OHS): 1.21 GM/DL (ref 0.7–1.3)
BETA GLOB% (OHS): 14.89 (ref 13.8–19.7)
GAMMA GLOBULIN % (OHS): 25.58 (ref 10.1–21.9)
GAMMA GLOBULIN (OHS): 2.07 GM/DL (ref 0.4–1.8)
GLOBULIN SER-MCNC: 4.4 GM/DL (ref 2.4–3.5)
M SPIKE % (OHS): ABNORMAL
M SPIKE (OHS): ABNORMAL
PATH REV: NORMAL
PROT SERPL-MCNC: 8.1 GM/DL (ref 5.8–7.6)

## 2022-11-25 LAB
ALBUMIN 24H UR ELPH-MRATE: 49.3 MG/24 H
ALBUMIN/GLOB 24H UR ELPH: 1.13 {RATIO}
ALPHA1 GLOB 24H UR ELPH-MRATE: 4.7 MG/24 H
ALPHA2 GLOB 24H UR ELPH-MRATE: 15.8 MG/24 H
B-GLOBULIN 24H UR ELPH-MRATE: 10.2 MG/24 H
COLLECT DURATION TIME UR: 24 H
GAMMA GLOB 24H UR ELPH-MRATE: 13 MG/24 H
INTERPRETATION 24H UR IFE-IMP: NORMAL
M IMMUNOFIXATION FLAG, 24 HR, U: NEGATIVE
PROT 24H UR-MRATE: 93 MG/24 H
PROT PATTERN 24H UR ELPH-IMP: NORMAL
SPECIMEN VOL ?TM UR: 1550 ML

## 2022-11-30 ENCOUNTER — OFFICE VISIT (OUTPATIENT)
Dept: HEMATOLOGY/ONCOLOGY | Facility: CLINIC | Age: 77
End: 2022-11-30
Payer: MEDICARE

## 2022-11-30 VITALS
HEIGHT: 69 IN | BODY MASS INDEX: 34.8 KG/M2 | HEART RATE: 71 BPM | TEMPERATURE: 98 F | DIASTOLIC BLOOD PRESSURE: 69 MMHG | WEIGHT: 235 LBS | SYSTOLIC BLOOD PRESSURE: 166 MMHG | OXYGEN SATURATION: 97 %

## 2022-11-30 DIAGNOSIS — G62.89 OTHER POLYNEUROPATHY: ICD-10-CM

## 2022-11-30 DIAGNOSIS — D47.2 MONOCLONAL GAMMOPATHY OF UNKNOWN SIGNIFICANCE (MGUS): Primary | ICD-10-CM

## 2022-11-30 PROCEDURE — 3077F SYST BP >= 140 MM HG: CPT | Mod: CPTII,S$GLB,, | Performed by: INTERNAL MEDICINE

## 2022-11-30 PROCEDURE — 3061F PR NEG MICROALBUMINURIA RESULT DOCUMENTED/REVIEW: ICD-10-PCS | Mod: CPTII,S$GLB,, | Performed by: INTERNAL MEDICINE

## 2022-11-30 PROCEDURE — 3078F PR MOST RECENT DIASTOLIC BLOOD PRESSURE < 80 MM HG: ICD-10-PCS | Mod: CPTII,S$GLB,, | Performed by: INTERNAL MEDICINE

## 2022-11-30 PROCEDURE — 1126F AMNT PAIN NOTED NONE PRSNT: CPT | Mod: CPTII,S$GLB,, | Performed by: INTERNAL MEDICINE

## 2022-11-30 PROCEDURE — 3288F FALL RISK ASSESSMENT DOCD: CPT | Mod: CPTII,S$GLB,, | Performed by: INTERNAL MEDICINE

## 2022-11-30 PROCEDURE — 1101F PR PT FALLS ASSESS DOC 0-1 FALLS W/OUT INJ PAST YR: ICD-10-PCS | Mod: CPTII,S$GLB,, | Performed by: INTERNAL MEDICINE

## 2022-11-30 PROCEDURE — 99214 PR OFFICE/OUTPT VISIT, EST, LEVL IV, 30-39 MIN: ICD-10-PCS | Mod: S$GLB,,, | Performed by: INTERNAL MEDICINE

## 2022-11-30 PROCEDURE — 1126F PR PAIN SEVERITY QUANTIFIED, NO PAIN PRESENT: ICD-10-PCS | Mod: CPTII,S$GLB,, | Performed by: INTERNAL MEDICINE

## 2022-11-30 PROCEDURE — 3078F DIAST BP <80 MM HG: CPT | Mod: CPTII,S$GLB,, | Performed by: INTERNAL MEDICINE

## 2022-11-30 PROCEDURE — 1101F PT FALLS ASSESS-DOCD LE1/YR: CPT | Mod: CPTII,S$GLB,, | Performed by: INTERNAL MEDICINE

## 2022-11-30 PROCEDURE — 1160F RVW MEDS BY RX/DR IN RCRD: CPT | Mod: CPTII,S$GLB,, | Performed by: INTERNAL MEDICINE

## 2022-11-30 PROCEDURE — 1160F PR REVIEW ALL MEDS BY PRESCRIBER/CLIN PHARMACIST DOCUMENTED: ICD-10-PCS | Mod: CPTII,S$GLB,, | Performed by: INTERNAL MEDICINE

## 2022-11-30 PROCEDURE — 3288F PR FALLS RISK ASSESSMENT DOCUMENTED: ICD-10-PCS | Mod: CPTII,S$GLB,, | Performed by: INTERNAL MEDICINE

## 2022-11-30 PROCEDURE — 3077F PR MOST RECENT SYSTOLIC BLOOD PRESSURE >= 140 MM HG: ICD-10-PCS | Mod: CPTII,S$GLB,, | Performed by: INTERNAL MEDICINE

## 2022-11-30 PROCEDURE — 3066F PR DOCUMENTATION OF TREATMENT FOR NEPHROPATHY: ICD-10-PCS | Mod: CPTII,S$GLB,, | Performed by: INTERNAL MEDICINE

## 2022-11-30 PROCEDURE — 99214 OFFICE O/P EST MOD 30 MIN: CPT | Mod: S$GLB,,, | Performed by: INTERNAL MEDICINE

## 2022-11-30 PROCEDURE — 1159F PR MEDICATION LIST DOCUMENTED IN MEDICAL RECORD: ICD-10-PCS | Mod: CPTII,S$GLB,, | Performed by: INTERNAL MEDICINE

## 2022-11-30 PROCEDURE — 99999 PR PBB SHADOW E&M-EST. PATIENT-LVL IV: ICD-10-PCS | Mod: PBBFAC,,, | Performed by: INTERNAL MEDICINE

## 2022-11-30 PROCEDURE — 1159F MED LIST DOCD IN RCRD: CPT | Mod: CPTII,S$GLB,, | Performed by: INTERNAL MEDICINE

## 2022-11-30 PROCEDURE — 3061F NEG MICROALBUMINURIA REV: CPT | Mod: CPTII,S$GLB,, | Performed by: INTERNAL MEDICINE

## 2022-11-30 PROCEDURE — 3066F NEPHROPATHY DOC TX: CPT | Mod: CPTII,S$GLB,, | Performed by: INTERNAL MEDICINE

## 2022-11-30 PROCEDURE — 99999 PR PBB SHADOW E&M-EST. PATIENT-LVL IV: CPT | Mod: PBBFAC,,, | Performed by: INTERNAL MEDICINE

## 2022-11-30 NOTE — PROGRESS NOTES
HEMATOLOGY/ONCOLOGY OFFICE CLINIC VISIT    Visit Information:    Initial Evaluation: 8/30/2018, 11/15/2022  Referring Provider: Dr Thacker  Other providers:Dr Sharp  Code status:Not addressed    Diagnosis:  MGUS    Present treatment:    Treatment/Oncology history:    Plan of care: MM w/u    Imaging:  Skeletal Survey 8/31/2018: No suspicious osseous lesions are identified radiographically.      Pathology:      CLINICAL HISTORY:       Patient: Galdino Joshi is a 77 y.o. male  kindly referred by Dr. Thacker for evaluation of gammopathy. I saw him back on 8/30/2018 but lost followup. He did not return to the clinic. At the time, patient had routine blood work revealed increase in protein levels and further studies showed M-spikes in the gamma region. The monoclonal protein peaks collectively account for 1.68 g/dL of the total 1.92 g/dL of protein in the gamma region. JAILYN pattern shows IgG type lambda and IgM type kappa biclonal proteins.    Repeated labs 8/30/2018:  Immunofixation shows IgM monoclonal protein with kappa light chain specificity.  Immunofixation shows IgG monoclonal protein with lambda light chain specificity.  Two spikes observed.  0.3, 0.9    IgM   434  IgG   1571  IgA    146    Kappa 28.6  Lambda 38.6  K/L ratio 0.74    Patient also with elevated PSA for which has been follow since 2015 when he started with synmptoms of enlarge prostate.     Other than that he is doing well and does not have any complaints at all. He is here with his wife.   He denies any fever, chills, sweats. No chest pain or shortness of breath. Occ heaturia. No blood clots. No family history of blood disorders.      Chief Complaint: No Concerns today      Interval History:  Patient presents today for follow up to discuss lab results, he is with his wife. He is doing well and has no complaints today.       Past Medical History:   Diagnosis Date    BPH (benign prostatic hyperplasia)     Essential (primary) hypertension     Mixed  hyperlipidemia     Proteinuria     Type 2 diabetes mellitus without complication, without long-term current use of insulin       Past Surgical History:   Procedure Laterality Date    CARPAL TUNNEL RELEASE N/A     CARPAL TUNNEL RELEASE N/A     neuroplasty median nerve at carpal tunnel  Right     RELEASE OF ULNAR NERVE AT CUBITAL TUNNEL Right     SPINAL FUSION       Family History   Problem Relation Age of Onset    Heart disease Mother      Social Connections: Socially Integrated    Frequency of Communication with Friends and Family: More than three times a week    Frequency of Social Gatherings with Friends and Family: Once a week    Attends [employer] Services: More than 4 times per year    Active Member of Clubs or Organizations: No    Attends Club or Organization Meetings: 1 to 4 times per year    Marital Status:        Review of patient's allergies indicates:  No Known Allergies   Current Outpatient Medications on File Prior to Visit   Medication Sig Dispense Refill    amLODIPine (NORVASC) 10 MG tablet Take 1 tablet (10 mg total) by mouth once daily. 90 tablet 3    atorvastatin (LIPITOR) 40 MG tablet Take 1 tablet (40 mg total) by mouth every evening. 90 tablet 3    colchicine (COLCRYS) 0.6 mg tablet 1.2 mg p.o. x1 then 0.6 mg p.o. 1 hour later x1 3 tablet 4    ergocalciferol (ERGOCALCIFEROL) 50,000 unit Cap TAKE 1 CAPSULE EVERY WEEK 13 capsule 4    febuxostat (ULORIC) 40 mg Tab Take 1 tablet (40 mg total) by mouth once daily. 90 tablet 3    finasteride (PROSCAR) 5 mg tablet TAKE 1 TABLET EVERY DAY 90 tablet 4    gabapentin (NEURONTIN) 300 MG capsule Take 1 capsule (300 mg total) by mouth 3 (three) times daily as needed (neuropathic pain). 90 capsule 0    metFORMIN (GLUCOPHAGE) 1000 MG tablet TAKE 1 TABLET TWICE DAILY 180 tablet 4    metoprolol succinate (TOPROL-XL) 50 MG 24 hr tablet TAKE 1 TABLET EVERY DAY 90 tablet 3    olmesartan-hydrochlorothiazide (BENICAR HCT) 40-25 mg per tablet TAKE 1 TABLET EVERY  "DAY 90 tablet 4    semaglutide (RYBELSUS) 14 mg tablet Take 1 tablet (14 mg total) by mouth once daily. 90 tablet 3     No current facility-administered medications on file prior to visit.      Review of Systems   Constitutional:  Negative for activity change, appetite change, chills, diaphoresis, fatigue, fever and unexpected weight change.   HENT:  Negative for nasal congestion, mouth sores, nosebleeds, postnasal drip, sinus pressure/congestion, sore throat and trouble swallowing.    Eyes:  Negative for visual disturbance.   Cardiovascular:  Negative for chest pain and palpitations.   Gastrointestinal:  Negative for abdominal distention, abdominal pain, blood in stool, change in bowel habit, constipation, diarrhea, nausea, vomiting and change in bowel habit.   Endocrine: Negative.    Genitourinary:  Negative for bladder incontinence, decreased urine volume, difficulty urinating, dysuria, frequency, hematuria, scrotal swelling, testicular pain and urgency.   Musculoskeletal:  Negative for arthralgias, back pain, gait problem, joint swelling, leg pain, myalgias and neck pain.   Integumentary:  Negative for rash.   Neurological:  Negative for dizziness, tremors, seizures, syncope, speech difficulty, weakness, light-headedness, numbness, headaches and memory loss.   Hematological:  Does not bruise/bleed easily.   Psychiatric/Behavioral:  Negative for agitation, confusion, hallucinations, sleep disturbance and suicidal ideas. The patient is not nervous/anxious.             Vitals:    11/30/22 1127   BP: (!) 166/69   BP Location: Right arm   Patient Position: Sitting   Pulse: 71   Temp: 97.9 °F (36.6 °C)   TempSrc: Oral   SpO2: 97%   Weight: 106.6 kg (235 lb)   Height: 5' 9" (1.753 m)      Physical Exam  Vitals and nursing note reviewed.   Constitutional:       General: He is not in acute distress.     Appearance: Normal appearance.   HENT:      Head: Normocephalic and atraumatic.      Mouth/Throat:      Mouth: Mucous " membranes are moist.   Eyes:      General: No scleral icterus.     Extraocular Movements: Extraocular movements intact.      Conjunctiva/sclera: Conjunctivae normal.   Neck:      Vascular: No JVD.   Cardiovascular:      Rate and Rhythm: Normal rate and regular rhythm.      Heart sounds: No murmur heard.  Pulmonary:      Effort: Pulmonary effort is normal.      Breath sounds: Normal breath sounds. No wheezing or rhonchi.   Abdominal:      General: Bowel sounds are normal. There is no distension.      Palpations: Abdomen is soft.      Tenderness: There is no abdominal tenderness.   Musculoskeletal:         General: No swelling or deformity.      Cervical back: Neck supple.   Lymphadenopathy:      Head:      Right side of head: No submandibular adenopathy.      Left side of head: No submandibular adenopathy.      Cervical: No cervical adenopathy.      Upper Body:      Right upper body: No supraclavicular or axillary adenopathy.      Left upper body: No supraclavicular or axillary adenopathy.      Lower Body: No right inguinal adenopathy. No left inguinal adenopathy.   Skin:     General: Skin is warm.      Coloration: Skin is not jaundiced.      Findings: No rash.   Neurological:      General: No focal deficit present.      Mental Status: He is alert and oriented to person, place, and time.      Cranial Nerves: Cranial nerves 2-12 are intact.   Psychiatric:         Attention and Perception: Attention normal.         Behavior: Behavior is cooperative.     ECOG SCORE    0 - Fully active-able to carry on all pre-disease performance without restriction         Laboratory:  CBC with Differential:  Lab Results   Component Value Date    WBC 5.6 11/15/2022    RBC 4.96 11/15/2022    HGB 15.1 11/15/2022    HCT 47.7 11/15/2022    MCV 96.2 (H) 11/15/2022    MCH 30.4 11/15/2022    MCHC 31.7 (L) 11/15/2022    RDW 12.5 11/15/2022     11/15/2022    MPV 8.2 11/15/2022        CMP:  Sodium Level   Date Value Ref Range Status    11/15/2022 136 136 - 145 mmol/L Final     Potassium Level   Date Value Ref Range Status   11/15/2022 4.0 3.5 - 5.1 mmol/L Final     Carbon Dioxide   Date Value Ref Range Status   11/15/2022 27 23 - 31 mmol/L Final     Blood Urea Nitrogen   Date Value Ref Range Status   11/15/2022 11.8 8.4 - 25.7 mg/dL Final     Creatinine   Date Value Ref Range Status   11/15/2022 1.25 (H) 0.73 - 1.18 mg/dL Final     Calcium Level Total   Date Value Ref Range Status   11/15/2022 10.5 (H) 8.8 - 10.0 mg/dL Final     Albumin Level   Date Value Ref Range Status   11/15/2022 4.3 3.4 - 4.8 gm/dL Final   11/15/2022 3.7 3.4 - 4.8 gm/dL Final     Bilirubin Total   Date Value Ref Range Status   11/15/2022 0.9 <=1.5 mg/dL Final     Alkaline Phosphatase   Date Value Ref Range Status   11/15/2022 116 40 - 150 unit/L Final     Aspartate Aminotransferase   Date Value Ref Range Status   11/15/2022 18 5 - 34 unit/L Final     Alanine Aminotransferase   Date Value Ref Range Status   11/15/2022 22 0 - 55 unit/L Final     Estimated GFR-Non    Date Value Ref Range Status   05/03/2022 >60 mls/min/1.73/m2 Final         Assessment:       1. Monoclonal gammopathy of unknown significance (MGUS)    2. Other polyneuropathy          Plan:       Again, Discussed with the patient today that Multiple myeloma is a type of bone marrow cancer that forms in a type of white blood cell called a plasma cell. Plasma cells help you fight infections by making antibodies that recognize and attack germs but when they transformed into cancer cells then they are not efficient and don't work well. This cells can affect not only the bone marrow but the bones in general causing distinctive bone lesions called Lytic lesions. It also can affect the kidney. Discussed prognosis and treatment recomendations and indications.  Discussed Monoclonal gammopathy of undetermined significance (MGUS) and that is the most common of a spectrum of diseases called plasma cell  dyscrasias. The term MGUS denotes the presence of a monoclonal immunoglobulin (Ig), or M-protein, in the serum or urine in persons without evidence of multiple myeloma (MM), Waldenström macroglobulinemia (WM), amyloidosis (AL) or other lymphoproliferative disorders. The risk of progression to MM or other lymphoproliferative disorder is present at a constant rate throughout the remainder of a patient's life. This risk has been quantified at 1% per year.  I also discussed with the patient that elevated globulin can be associated with autoimmune disorders or recent infections/inflammation.  This is a disorder of the immune system so anything that affect immune system can increase the globulin level.     Skeletal survey @ [facility]  RTC in 6 months with MM labs, no urine  Labs will be done @ [facility] 1 week prior  Bone marrow biopsy will be defer for now but may need in the future.  The patient was given ample opportunity to ask questions and they were all answered to satisfaction; patient demonstrated understanding of what we discussed and is agreeable to the plan.      SRI TAYLOR MD    Professional Services   I, Madhuri Chandler LPN, acted solely as a scribe for and in the presence of Dr. Sri Taylor, who performed these services.

## 2022-12-30 ENCOUNTER — TELEPHONE (OUTPATIENT)
Dept: INTERNAL MEDICINE | Facility: CLINIC | Age: 77
End: 2022-12-30
Payer: MEDICARE

## 2022-12-30 NOTE — TELEPHONE ENCOUNTER
Spoke with patient to see if he had an eye exam this year.  The patient stated that he did not have one completed.

## 2023-02-02 ENCOUNTER — TELEPHONE (OUTPATIENT)
Dept: INTERNAL MEDICINE | Facility: CLINIC | Age: 78
End: 2023-02-02
Payer: MEDICARE

## 2023-02-02 NOTE — TELEPHONE ENCOUNTER
----- Message from Gregory Tanner MA sent at 2/2/2023  8:19 AM CST -----  Regarding: PV 2/9/23 @ 10:00 Dr. Thacker  1. Are there any outstanding tasks in the patient's chart? Yes, fasting labs    2. Is there any documentation in the chart? No    3.Has patient been seen in an ER, Urgent care clinic, or been admitted since last visit?  If yes, When, where, and why    4. Has patient seen any other healthcare providers since last visit?  If yes, when, where, and why    5. Has patient had any bloodwork or XR done since last visit?    6. Is patient signed up for patient portal?

## 2023-02-06 ENCOUNTER — LAB VISIT (OUTPATIENT)
Dept: LAB | Facility: HOSPITAL | Age: 78
End: 2023-02-06
Attending: INTERNAL MEDICINE
Payer: MEDICARE

## 2023-02-06 DIAGNOSIS — G62.89 OTHER POLYNEUROPATHY: ICD-10-CM

## 2023-02-06 DIAGNOSIS — E11.42 TYPE 2 DIABETES MELLITUS WITH DIABETIC POLYNEUROPATHY, WITHOUT LONG-TERM CURRENT USE OF INSULIN: ICD-10-CM

## 2023-02-06 DIAGNOSIS — D47.2 MGUS (MONOCLONAL GAMMOPATHY OF UNKNOWN SIGNIFICANCE): ICD-10-CM

## 2023-02-06 LAB
ALBUMIN SERPL-MCNC: 4.2 G/DL (ref 3.4–4.8)
ALBUMIN/GLOB SERPL: 1.1 RATIO (ref 1.1–2)
ALP SERPL-CCNC: 111 UNIT/L (ref 40–150)
ALT SERPL-CCNC: 26 UNIT/L (ref 0–55)
AST SERPL-CCNC: 19 UNIT/L (ref 5–34)
BILIRUBIN DIRECT+TOT PNL SERPL-MCNC: 0.7 MG/DL
BUN SERPL-MCNC: 15.2 MG/DL (ref 8.4–25.7)
CALCIUM SERPL-MCNC: 10.1 MG/DL (ref 8.8–10)
CHLORIDE SERPL-SCNC: 102 MMOL/L (ref 98–107)
CHOLEST SERPL-MCNC: 188 MG/DL
CHOLEST/HDLC SERPL: 3 {RATIO} (ref 0–5)
CO2 SERPL-SCNC: 28 MMOL/L (ref 23–31)
CREAT SERPL-MCNC: 1.36 MG/DL (ref 0.73–1.18)
CREAT UR-MCNC: 46.9 MG/DL (ref 63–166)
EST. AVERAGE GLUCOSE BLD GHB EST-MCNC: 145.6 MG/DL
GFR SERPLBLD CREATININE-BSD FMLA CKD-EPI: 54 MLS/MIN/1.73/M2
GLOBULIN SER-MCNC: 4 GM/DL (ref 2.4–3.5)
GLUCOSE SERPL-MCNC: 116 MG/DL (ref 82–115)
HBA1C MFR BLD: 6.7 %
HDLC SERPL-MCNC: 56 MG/DL (ref 35–60)
LDLC SERPL CALC-MCNC: 113 MG/DL (ref 50–140)
MICROALBUMIN UR-MCNC: 9 UG/ML
MICROALBUMIN/CREAT RATIO PNL UR: 19.2 MG/GM CR (ref 0–30)
POTASSIUM SERPL-SCNC: 4.1 MMOL/L (ref 3.5–5.1)
PROT SERPL-MCNC: 8.2 GM/DL (ref 5.8–7.6)
SODIUM SERPL-SCNC: 138 MMOL/L (ref 136–145)
TRIGL SERPL-MCNC: 96 MG/DL (ref 34–140)
VLDLC SERPL CALC-MCNC: 19 MG/DL

## 2023-02-06 PROCEDURE — 80061 LIPID PANEL: CPT

## 2023-02-06 PROCEDURE — 36415 COLL VENOUS BLD VENIPUNCTURE: CPT

## 2023-02-06 PROCEDURE — 83036 HEMOGLOBIN GLYCOSYLATED A1C: CPT

## 2023-02-06 PROCEDURE — 82043 UR ALBUMIN QUANTITATIVE: CPT

## 2023-02-06 PROCEDURE — 80053 COMPREHEN METABOLIC PANEL: CPT

## 2023-02-09 ENCOUNTER — OFFICE VISIT (OUTPATIENT)
Dept: INTERNAL MEDICINE | Facility: CLINIC | Age: 78
End: 2023-02-09
Payer: MEDICARE

## 2023-02-09 VITALS
HEART RATE: 87 BPM | HEIGHT: 69 IN | WEIGHT: 233 LBS | DIASTOLIC BLOOD PRESSURE: 68 MMHG | TEMPERATURE: 98 F | OXYGEN SATURATION: 98 % | BODY MASS INDEX: 34.51 KG/M2 | RESPIRATION RATE: 16 BRPM | SYSTOLIC BLOOD PRESSURE: 134 MMHG

## 2023-02-09 DIAGNOSIS — E11.42 TYPE 2 DIABETES MELLITUS WITH DIABETIC POLYNEUROPATHY, WITHOUT LONG-TERM CURRENT USE OF INSULIN: ICD-10-CM

## 2023-02-09 DIAGNOSIS — D47.2 MONOCLONAL GAMMOPATHY OF UNKNOWN SIGNIFICANCE (MGUS): ICD-10-CM

## 2023-02-09 DIAGNOSIS — N18.31 CHRONIC KIDNEY DISEASE, STAGE 3A: Primary | ICD-10-CM

## 2023-02-09 DIAGNOSIS — E11.59 HYPERTENSION ASSOCIATED WITH DIABETES: ICD-10-CM

## 2023-02-09 DIAGNOSIS — E78.5 HYPERLIPIDEMIA, UNSPECIFIED HYPERLIPIDEMIA TYPE: ICD-10-CM

## 2023-02-09 DIAGNOSIS — I15.2 HYPERTENSION ASSOCIATED WITH DIABETES: ICD-10-CM

## 2023-02-09 DIAGNOSIS — E66.01 SEVERE OBESITY (BMI 35.0-39.9) WITH COMORBIDITY: ICD-10-CM

## 2023-02-09 PROCEDURE — 3075F SYST BP GE 130 - 139MM HG: CPT | Mod: CPTII,,, | Performed by: INTERNAL MEDICINE

## 2023-02-09 PROCEDURE — 1159F MED LIST DOCD IN RCRD: CPT | Mod: CPTII,,, | Performed by: INTERNAL MEDICINE

## 2023-02-09 PROCEDURE — 3075F PR MOST RECENT SYSTOLIC BLOOD PRESS GE 130-139MM HG: ICD-10-PCS | Mod: CPTII,,, | Performed by: INTERNAL MEDICINE

## 2023-02-09 PROCEDURE — 3288F PR FALLS RISK ASSESSMENT DOCUMENTED: ICD-10-PCS | Mod: CPTII,,, | Performed by: INTERNAL MEDICINE

## 2023-02-09 PROCEDURE — 1101F PR PT FALLS ASSESS DOC 0-1 FALLS W/OUT INJ PAST YR: ICD-10-PCS | Mod: CPTII,,, | Performed by: INTERNAL MEDICINE

## 2023-02-09 PROCEDURE — 3288F FALL RISK ASSESSMENT DOCD: CPT | Mod: CPTII,,, | Performed by: INTERNAL MEDICINE

## 2023-02-09 PROCEDURE — 99213 PR OFFICE/OUTPT VISIT, EST, LEVL III, 20-29 MIN: ICD-10-PCS | Mod: ,,, | Performed by: INTERNAL MEDICINE

## 2023-02-09 PROCEDURE — 1159F PR MEDICATION LIST DOCUMENTED IN MEDICAL RECORD: ICD-10-PCS | Mod: CPTII,,, | Performed by: INTERNAL MEDICINE

## 2023-02-09 PROCEDURE — 3078F PR MOST RECENT DIASTOLIC BLOOD PRESSURE < 80 MM HG: ICD-10-PCS | Mod: CPTII,,, | Performed by: INTERNAL MEDICINE

## 2023-02-09 PROCEDURE — 3078F DIAST BP <80 MM HG: CPT | Mod: CPTII,,, | Performed by: INTERNAL MEDICINE

## 2023-02-09 PROCEDURE — 99213 OFFICE O/P EST LOW 20 MIN: CPT | Mod: ,,, | Performed by: INTERNAL MEDICINE

## 2023-02-09 PROCEDURE — 1101F PT FALLS ASSESS-DOCD LE1/YR: CPT | Mod: CPTII,,, | Performed by: INTERNAL MEDICINE

## 2023-02-09 RX ORDER — FEBUXOSTAT 40 MG/1
40 TABLET, FILM COATED ORAL DAILY
Qty: 90 TABLET | Refills: 3 | Status: SHIPPED | OUTPATIENT
Start: 2023-02-09 | End: 2023-08-11

## 2023-02-09 NOTE — PROGRESS NOTES
Subjective:      Patient ID: Galdino Joshi is a 77 y.o. male.    Chief Complaint: Diabetes (3 month F/U)      HPI:  77 y.o. male for diabetic revisit; A1c of 6.7; fasting of 116  He has never had a colonoscopy and does not want a colonoscopy right now.   Dr. Sharp- urology for BPH, PSA. Follows [facility] every 6 months  Dr. Adorno- cardiologist; hasn't been in a couple years  Dr. Dawkins- IGG monoclonal gammopathy; globulin 4.  Of 4.  Tells me he has not been for recent appointment  Dr. Bettye tolbert goes next week   UTD with pneumonia vaccines, flu vaccine  declines shingles vaccine from pharmacy  Covid vaccinated  Mom  at 32 from alcohol use; Dad  at 72 with unknown medical problems; no siblings  Weight is down 2 lbs  No new issues    Past Medical History:  Past Medical History:   Diagnosis Date    BPH (benign prostatic hyperplasia)     Essential (primary) hypertension     Mixed hyperlipidemia     Proteinuria     Type 2 diabetes mellitus without complication, without long-term current use of insulin      Past Surgical History:   Procedure Laterality Date    CARPAL TUNNEL RELEASE N/A     CARPAL TUNNEL RELEASE N/A     neuroplasty median nerve at carpal tunnel  Right     RELEASE OF ULNAR NERVE AT CUBITAL TUNNEL Right     SPINAL FUSION       Review of patient's allergies indicates:  No Known Allergies  Current Outpatient Medications on File Prior to Visit   Medication Sig Dispense Refill    amLODIPine (NORVASC) 10 MG tablet Take 1 tablet (10 mg total) by mouth once daily. 90 tablet 3    atorvastatin (LIPITOR) 40 MG tablet Take 1 tablet (40 mg total) by mouth every evening. 90 tablet 3    colchicine (COLCRYS) 0.6 mg tablet 1.2 mg p.o. x1 then 0.6 mg p.o. 1 hour later x1 3 tablet 4    ergocalciferol (ERGOCALCIFEROL) 50,000 unit Cap TAKE 1 CAPSULE EVERY WEEK 13 capsule 4    febuxostat (ULORIC) 40 mg Tab Take 1 tablet (40 mg total) by mouth once daily. 90 tablet 3    finasteride (PROSCAR) 5 mg tablet TAKE 1  TABLET EVERY DAY 90 tablet 4    gabapentin (NEURONTIN) 300 MG capsule Take 1 capsule (300 mg total) by mouth 3 (three) times daily as needed (neuropathic pain). 90 capsule 0    metFORMIN (GLUCOPHAGE) 1000 MG tablet TAKE 1 TABLET TWICE DAILY 180 tablet 4    metoprolol succinate (TOPROL-XL) 50 MG 24 hr tablet TAKE 1 TABLET EVERY DAY 90 tablet 3    olmesartan-hydrochlorothiazide (BENICAR HCT) 40-25 mg per tablet TAKE 1 TABLET EVERY DAY 90 tablet 4    semaglutide (RYBELSUS) 14 mg tablet Take 1 tablet (14 mg total) by mouth once daily. 90 tablet 3     No current facility-administered medications on file prior to visit.     Social History     Socioeconomic History    Marital status:    Tobacco Use    Smoking status: Never    Smokeless tobacco: Current     Types: Chew   Substance and Sexual Activity    Alcohol use: Yes     Alcohol/week: 35.0 standard drinks     Types: 35 Cans of beer per week    Drug use: Never    Sexual activity: Not Currently     Social Determinants of Health     Financial Resource Strain: Low Risk     Difficulty of Paying Living Expenses: Not hard at all   Food Insecurity: No Food Insecurity    Worried About Running Out of Food in the Last Year: Never true    Ran Out of Food in the Last Year: Never true   Transportation Needs: No Transportation Needs    Lack of Transportation (Medical): No    Lack of Transportation (Non-Medical): No   Physical Activity: Sufficiently Active    Days of Exercise per Week: 7 days    Minutes of Exercise per Session: 60 min   Social Connections: Socially Integrated    Frequency of Communication with Friends and Family: More than three times a week    Frequency of Social Gatherings with Friends and Family: Once a week    Attends Christian Services: More than 4 times per year    Active Member of Clubs or Organizations: No    Attends Club or Organization Meetings: 1 to 4 times per year    Marital Status:    Housing Stability: Unknown    Unable to Pay for Housing in  "the Last Year: No    Unstable Housing in the Last Year: No     Family History   Problem Relation Age of Onset    Heart disease Mother        Review of Systems  A comprehensive review of systems was performed and was negative with exception of what is documented above.     Objective:   /68 (BP Location: Right arm, Patient Position: Sitting, BP Method: Medium (Manual))   Pulse 87   Temp 97.7 °F (36.5 °C) (Temporal)   Resp 16   Ht 5' 9" (1.753 m)   Wt 105.7 kg (233 lb)   SpO2 98%   BMI 34.41 kg/m²   Physical Exam  General : Alert and oriented, No acute distress, afebrile. Obese  Eye : PERRLA. EOMI. Normal conjunctiva, Sclerae are nonicteric.   Respiratory : Respirations are non-labored and clear to auscultation bilaterally. Symmetrical air entry bilaterally, no crackles, no wheezes, no rhonchi. No cyanosis, no clubbing.  Cardiovascular : Normal rate, Regular rhythm. No murmurs, rubs, or gallops. Pulses are 2+ throughout. No JVD. No Edema.  Gastrointestinal : Soft, nontender, non-distended, bowel sounds are present in all quadrants, no organomegaly, no guarding, no rebound.  Musculoskeletal : Normal range of motion throughout. No muscle tenderness.  Integumentary : Warm, moist, intact.  Neurologic : Alert, Oriented  Psychiatric : Cooperative, Appropriate mood & affect.   Assessment/ Plan:   1. Chronic kidney disease, stage 3a    2. Type 2 diabetes mellitus with diabetic polyneuropathy, without long-term current use of insulin  Assessment & Plan:  Goal on current regimen, continue monitoring      3. Monoclonal gammopathy of unknown significance (MGUS)  Overview:  Followed by Dr. Dawkins    Assessment & Plan:  Patient with elevation in kappa and lambda light chain  No evidence of M spike being followed by Dr. Dawkins's scheduled for follow-up in the next upcoming months with updated skeletal survey.      4. Hypertension associated with diabetes  Overview:  At goal, stable on current mgmt    Assessment & " Plan:  At goal, continue current regimen      5. Hyperlipidemia, unspecified hyperlipidemia type  Assessment & Plan:  At goal, continue current regimen      6. Severe obesity (BMI 35.0-39.9) with comorbidity    Other orders  -     febuxostat (ULORIC) 40 mg Tab; Take 1 tablet (40 mg total) by mouth once daily.  Dispense: 90 tablet; Refill: 3             Follow up in about 6 months (around 8/9/2023) for NURSE PRACTITIONER, WELLNESS, with labs prior to visit.

## 2023-02-09 NOTE — ASSESSMENT & PLAN NOTE
Patient with elevation in kappa and lambda light chain  No evidence of M spike being followed by Dr. Dawkins's scheduled for follow-up in the next upcoming months with updated skeletal survey.

## 2023-03-28 ENCOUNTER — TELEPHONE (OUTPATIENT)
Dept: INTERNAL MEDICINE | Facility: CLINIC | Age: 78
End: 2023-03-28
Payer: MEDICARE

## 2023-03-28 DIAGNOSIS — I15.2 HYPERTENSION ASSOCIATED WITH DIABETES: ICD-10-CM

## 2023-03-28 DIAGNOSIS — E11.59 HYPERTENSION ASSOCIATED WITH DIABETES: ICD-10-CM

## 2023-03-28 DIAGNOSIS — E78.5 HYPERLIPIDEMIA, UNSPECIFIED HYPERLIPIDEMIA TYPE: ICD-10-CM

## 2023-03-28 RX ORDER — ATORVASTATIN CALCIUM 40 MG/1
40 TABLET, FILM COATED ORAL NIGHTLY
Qty: 90 TABLET | Refills: 3 | Status: SHIPPED | OUTPATIENT
Start: 2023-03-28 | End: 2023-08-11 | Stop reason: SDUPTHER

## 2023-03-28 RX ORDER — AMLODIPINE BESYLATE 10 MG/1
10 TABLET ORAL DAILY
Qty: 90 TABLET | Refills: 3 | Status: SHIPPED | OUTPATIENT
Start: 2023-03-28 | End: 2024-01-31

## 2023-03-28 NOTE — TELEPHONE ENCOUNTER
----- Message from Nelly Bemrudez sent at 3/28/2023  8:01 AM CDT -----  Amlodipine 10 mg tab  Atorvastatin 40 mg tab  [facility]

## 2023-05-22 ENCOUNTER — HOSPITAL ENCOUNTER (OUTPATIENT)
Dept: RADIOLOGY | Facility: HOSPITAL | Age: 78
Discharge: HOME | End: 2023-05-22
Attending: INTERNAL MEDICINE
Payer: MEDICARE

## 2023-05-22 DIAGNOSIS — D47.2 MONOCLONAL GAMMOPATHY OF UNKNOWN SIGNIFICANCE (MGUS): ICD-10-CM

## 2023-05-22 DIAGNOSIS — G62.89 OTHER POLYNEUROPATHY: ICD-10-CM

## 2023-05-22 PROCEDURE — 77075 RADEX OSSEOUS SURVEY COMPL: CPT | Mod: TC

## 2023-06-05 ENCOUNTER — OFFICE VISIT (OUTPATIENT)
Dept: HEMATOLOGY/ONCOLOGY | Facility: CLINIC | Age: 78
End: 2023-06-05
Payer: MEDICARE

## 2023-06-05 VITALS
WEIGHT: 240.63 LBS | BODY MASS INDEX: 35.64 KG/M2 | HEIGHT: 69 IN | SYSTOLIC BLOOD PRESSURE: 145 MMHG | DIASTOLIC BLOOD PRESSURE: 68 MMHG | HEART RATE: 76 BPM | OXYGEN SATURATION: 98 % | TEMPERATURE: 98 F

## 2023-06-05 DIAGNOSIS — D47.2 MGUS (MONOCLONAL GAMMOPATHY OF UNKNOWN SIGNIFICANCE): Primary | ICD-10-CM

## 2023-06-05 DIAGNOSIS — D47.2 MONOCLONAL GAMMOPATHY OF UNKNOWN SIGNIFICANCE (MGUS): ICD-10-CM

## 2023-06-05 DIAGNOSIS — G62.89 OTHER POLYNEUROPATHY: ICD-10-CM

## 2023-06-05 LAB
ALBUMIN SERPL-MCNC: 4 G/DL (ref 3.4–4.8)
ALBUMIN/GLOB SERPL: 1.1 RATIO (ref 1.1–2)
ALP SERPL-CCNC: 108 UNIT/L (ref 40–150)
ALT SERPL-CCNC: 25 UNIT/L (ref 0–55)
AST SERPL-CCNC: 22 UNIT/L (ref 5–34)
B2 MICROGLOB SERPL-MCNC: 1.89 MG/L (ref 0.97–2.64)
BASOPHILS # BLD AUTO: 0.02 X10(3)/MCL
BASOPHILS NFR BLD AUTO: 0.3 %
BILIRUBIN DIRECT+TOT PNL SERPL-MCNC: 0.4 MG/DL
BUN SERPL-MCNC: 8 MG/DL (ref 8.4–25.7)
CALCIUM SERPL-MCNC: 9.6 MG/DL (ref 8.8–10)
CHLORIDE SERPL-SCNC: 108 MMOL/L (ref 98–107)
CO2 SERPL-SCNC: 24 MMOL/L (ref 23–31)
CREAT SERPL-MCNC: 1.15 MG/DL (ref 0.73–1.18)
EOSINOPHIL # BLD AUTO: 0.12 X10(3)/MCL (ref 0–0.9)
EOSINOPHIL NFR BLD AUTO: 2 %
ERYTHROCYTE [DISTWIDTH] IN BLOOD BY AUTOMATED COUNT: 12.8 % (ref 11.5–17)
GFR SERPLBLD CREATININE-BSD FMLA CKD-EPI: >60 MLS/MIN/1.73/M2
GLOBULIN SER-MCNC: 3.7 GM/DL (ref 2.4–3.5)
GLUCOSE SERPL-MCNC: 135 MG/DL (ref 82–115)
HCT VFR BLD AUTO: 44.3 % (ref 42–52)
HGB BLD-MCNC: 14.4 G/DL (ref 14–18)
IGA SERPL-MCNC: 179 MG/DL (ref 101–645)
IGG SERPL-MCNC: 1792 MG/DL (ref 540–1822)
IGM SERPL-MCNC: 410 MG/DL (ref 22–240)
IMM GRANULOCYTES # BLD AUTO: 0.03 X10(3)/MCL (ref 0–0.04)
IMM GRANULOCYTES NFR BLD AUTO: 0.5 %
LYMPHOCYTES # BLD AUTO: 2.33 X10(3)/MCL (ref 0.6–4.6)
LYMPHOCYTES NFR BLD AUTO: 39.6 %
MCH RBC QN AUTO: 30.7 PG (ref 27–31)
MCHC RBC AUTO-ENTMCNC: 32.5 G/DL (ref 33–36)
MCV RBC AUTO: 94.5 FL (ref 80–94)
MONOCYTES # BLD AUTO: 0.47 X10(3)/MCL (ref 0.1–1.3)
MONOCYTES NFR BLD AUTO: 8 %
NEUTROPHILS # BLD AUTO: 2.92 X10(3)/MCL (ref 2.1–9.2)
NEUTROPHILS NFR BLD AUTO: 49.6 %
PLATELET # BLD AUTO: 250 X10(3)/MCL (ref 130–400)
PMV BLD AUTO: 8.4 FL (ref 7.4–10.4)
POTASSIUM SERPL-SCNC: 4.1 MMOL/L (ref 3.5–5.1)
PROT SERPL-MCNC: 7.7 GM/DL (ref 5.8–7.6)
RBC # BLD AUTO: 4.69 X10(6)/MCL (ref 4.7–6.1)
SODIUM SERPL-SCNC: 139 MMOL/L (ref 136–145)
WBC # SPEC AUTO: 5.89 X10(3)/MCL (ref 4.5–11.5)

## 2023-06-05 PROCEDURE — 3077F PR MOST RECENT SYSTOLIC BLOOD PRESSURE >= 140 MM HG: ICD-10-PCS | Mod: CPTII,S$GLB,, | Performed by: NURSE PRACTITIONER

## 2023-06-05 PROCEDURE — 1160F RVW MEDS BY RX/DR IN RCRD: CPT | Mod: CPTII,S$GLB,, | Performed by: NURSE PRACTITIONER

## 2023-06-05 PROCEDURE — 84165 PROTEIN E-PHORESIS SERUM: CPT | Performed by: NURSE PRACTITIONER

## 2023-06-05 PROCEDURE — 82784 ASSAY IGA/IGD/IGG/IGM EACH: CPT | Performed by: NURSE PRACTITIONER

## 2023-06-05 PROCEDURE — 1159F PR MEDICATION LIST DOCUMENTED IN MEDICAL RECORD: ICD-10-PCS | Mod: CPTII,S$GLB,, | Performed by: NURSE PRACTITIONER

## 2023-06-05 PROCEDURE — 3078F DIAST BP <80 MM HG: CPT | Mod: CPTII,S$GLB,, | Performed by: NURSE PRACTITIONER

## 2023-06-05 PROCEDURE — 86146 BETA-2 GLYCOPROTEIN ANTIBODY: CPT | Performed by: NURSE PRACTITIONER

## 2023-06-05 PROCEDURE — 99999 PR PBB SHADOW E&M-EST. PATIENT-LVL IV: CPT | Mod: PBBFAC,,, | Performed by: NURSE PRACTITIONER

## 2023-06-05 PROCEDURE — 86334 IMMUNOFIX E-PHORESIS SERUM: CPT | Performed by: NURSE PRACTITIONER

## 2023-06-05 PROCEDURE — 1126F AMNT PAIN NOTED NONE PRSNT: CPT | Mod: CPTII,S$GLB,, | Performed by: NURSE PRACTITIONER

## 2023-06-05 PROCEDURE — 3288F FALL RISK ASSESSMENT DOCD: CPT | Mod: CPTII,S$GLB,, | Performed by: NURSE PRACTITIONER

## 2023-06-05 PROCEDURE — 99213 OFFICE O/P EST LOW 20 MIN: CPT | Mod: S$GLB,,, | Performed by: NURSE PRACTITIONER

## 2023-06-05 PROCEDURE — 80053 COMPREHEN METABOLIC PANEL: CPT | Performed by: NURSE PRACTITIONER

## 2023-06-05 PROCEDURE — 1126F PR PAIN SEVERITY QUANTIFIED, NO PAIN PRESENT: ICD-10-PCS | Mod: CPTII,S$GLB,, | Performed by: NURSE PRACTITIONER

## 2023-06-05 PROCEDURE — 1101F PR PT FALLS ASSESS DOC 0-1 FALLS W/OUT INJ PAST YR: ICD-10-PCS | Mod: CPTII,S$GLB,, | Performed by: NURSE PRACTITIONER

## 2023-06-05 PROCEDURE — 36415 COLL VENOUS BLD VENIPUNCTURE: CPT | Performed by: NURSE PRACTITIONER

## 2023-06-05 PROCEDURE — 1160F PR REVIEW ALL MEDS BY PRESCRIBER/CLIN PHARMACIST DOCUMENTED: ICD-10-PCS | Mod: CPTII,S$GLB,, | Performed by: NURSE PRACTITIONER

## 2023-06-05 PROCEDURE — 3077F SYST BP >= 140 MM HG: CPT | Mod: CPTII,S$GLB,, | Performed by: NURSE PRACTITIONER

## 2023-06-05 PROCEDURE — 99999 PR PBB SHADOW E&M-EST. PATIENT-LVL IV: ICD-10-PCS | Mod: PBBFAC,,, | Performed by: NURSE PRACTITIONER

## 2023-06-05 PROCEDURE — 99213 PR OFFICE/OUTPT VISIT, EST, LEVL III, 20-29 MIN: ICD-10-PCS | Mod: S$GLB,,, | Performed by: NURSE PRACTITIONER

## 2023-06-05 PROCEDURE — 85025 COMPLETE CBC W/AUTO DIFF WBC: CPT | Performed by: NURSE PRACTITIONER

## 2023-06-05 PROCEDURE — 83521 IG LIGHT CHAINS FREE EACH: CPT | Mod: 59 | Performed by: NURSE PRACTITIONER

## 2023-06-05 PROCEDURE — 3288F PR FALLS RISK ASSESSMENT DOCUMENTED: ICD-10-PCS | Mod: CPTII,S$GLB,, | Performed by: NURSE PRACTITIONER

## 2023-06-05 PROCEDURE — 1159F MED LIST DOCD IN RCRD: CPT | Mod: CPTII,S$GLB,, | Performed by: NURSE PRACTITIONER

## 2023-06-05 PROCEDURE — 1101F PT FALLS ASSESS-DOCD LE1/YR: CPT | Mod: CPTII,S$GLB,, | Performed by: NURSE PRACTITIONER

## 2023-06-05 PROCEDURE — 3078F PR MOST RECENT DIASTOLIC BLOOD PRESSURE < 80 MM HG: ICD-10-PCS | Mod: CPTII,S$GLB,, | Performed by: NURSE PRACTITIONER

## 2023-06-05 NOTE — PROGRESS NOTES
HEMATOLOGY/ONCOLOGY OFFICE CLINIC VISIT    Visit Information:    Initial Evaluation: 8/30/2018, 11/15/2022  Referring Provider: Dr Thacker  Other providers:Dr Sharp  Code status:Not addressed    Diagnosis:  MGUS    Present treatment:    Treatment/Oncology history:    Plan of care: MM w/u    Imaging:  Skeletal Survey 8/31/2018: No suspicious osseous lesions are identified radiographically.      Pathology:      CLINICAL HISTORY:       Patient: Galdino Joshi is a 77 y.o. male  kindly referred by Dr. Thacker for evaluation of gammopathy. I saw him back on 8/30/2018 but lost followup. He did not return to the clinic. At the time, patient had routine blood work revealed increase in protein levels and further studies showed M-spikes in the gamma region. The monoclonal protein peaks collectively account for 1.68 g/dL of the total 1.92 g/dL of protein in the gamma region. JAILYN pattern shows IgG type lambda and IgM type kappa biclonal proteins.    Repeated labs 8/30/2018:  Immunofixation shows IgM monoclonal protein with kappa light chain specificity.  Immunofixation shows IgG monoclonal protein with lambda light chain specificity.  Two spikes observed.  0.3, 0.9    IgM   434  IgG   1571  IgA    146    Kappa 28.6  Lambda 38.6  K/L ratio 0.74    Patient also with elevated PSA for which has been follow since 2015 when he started with synmptoms of enlarge prostate.     Other than that he is doing well and does not have any complaints at all. He is here with his wife.   He denies any fever, chills, sweats. No chest pain or shortness of breath. Occ heaturia. No blood clots. No family history of blood disorders.      Chief Complaint: OTHER (No concerns today.)        Interval History:  Patient presents today for follow up. He did not have MGUS labs prior to this visit. He did have skeletal survey that did not show any lytic lesions. He has no complaints today.     Past Medical History:   Diagnosis Date    BPH (benign prostatic  hyperplasia)     Essential (primary) hypertension     Mixed hyperlipidemia     Proteinuria     Type 2 diabetes mellitus without complication, without long-term current use of insulin       Past Surgical History:   Procedure Laterality Date    CARPAL TUNNEL RELEASE N/A     CARPAL TUNNEL RELEASE N/A     neuroplasty median nerve at carpal tunnel  Right     RELEASE OF ULNAR NERVE AT CUBITAL TUNNEL Right     SPINAL FUSION       Family History   Problem Relation Age of Onset    Heart disease Mother      Social Connections: Not on file       Review of patient's allergies indicates:  No Known Allergies   Current Outpatient Medications on File Prior to Visit   Medication Sig Dispense Refill    amLODIPine (NORVASC) 10 MG tablet Take 1 tablet (10 mg total) by mouth once daily. 90 tablet 3    atorvastatin (LIPITOR) 40 MG tablet Take 1 tablet (40 mg total) by mouth every evening. 90 tablet 3    colchicine (COLCRYS) 0.6 mg tablet 1.2 mg p.o. x1 then 0.6 mg p.o. 1 hour later x1 3 tablet 4    ergocalciferol (ERGOCALCIFEROL) 50,000 unit Cap TAKE 1 CAPSULE EVERY WEEK 13 capsule 4    febuxostat (ULORIC) 40 mg Tab Take 1 tablet (40 mg total) by mouth once daily. 90 tablet 3    febuxostat (ULORIC) 40 mg Tab Take 1 tablet (40 mg total) by mouth once daily. 90 tablet 3    finasteride (PROSCAR) 5 mg tablet TAKE 1 TABLET EVERY DAY 90 tablet 4    gabapentin (NEURONTIN) 300 MG capsule Take 1 capsule (300 mg total) by mouth 3 (three) times daily as needed (neuropathic pain). 90 capsule 0    metFORMIN (GLUCOPHAGE) 1000 MG tablet TAKE 1 TABLET TWICE DAILY 180 tablet 4    metoprolol succinate (TOPROL-XL) 50 MG 24 hr tablet TAKE 1 TABLET EVERY DAY 90 tablet 3    semaglutide (RYBELSUS) 14 mg tablet Take 1 tablet (14 mg total) by mouth once daily. 90 tablet 3    olmesartan-hydrochlorothiazide (BENICAR HCT) 40-25 mg per tablet TAKE 1 TABLET EVERY DAY 90 tablet 4     No current facility-administered medications on file prior to visit.      Review of  "Systems   Constitutional:  Negative for activity change, appetite change, chills, diaphoresis, fatigue, fever and unexpected weight change.   HENT:  Negative for nasal congestion, mouth sores, nosebleeds, postnasal drip, sinus pressure/congestion, sore throat and trouble swallowing.    Eyes:  Negative for visual disturbance.   Respiratory:  Negative for cough and shortness of breath.    Cardiovascular:  Negative for chest pain and palpitations.   Gastrointestinal:  Negative for abdominal distention, abdominal pain, blood in stool, change in bowel habit, constipation, diarrhea, nausea, vomiting and change in bowel habit.   Endocrine: Negative.    Genitourinary:  Negative for bladder incontinence, decreased urine volume, difficulty urinating, dysuria, frequency, hematuria, scrotal swelling, testicular pain and urgency.   Musculoskeletal:  Negative for arthralgias, back pain, gait problem, joint swelling, leg pain, myalgias and neck pain.   Integumentary:  Negative for rash.   Neurological:  Negative for dizziness, tremors, seizures, syncope, speech difficulty, weakness, light-headedness, numbness, headaches and memory loss.   Hematological:  Negative for adenopathy. Does not bruise/bleed easily.   Psychiatric/Behavioral:  Negative for agitation, confusion, hallucinations, sleep disturbance and suicidal ideas. The patient is not nervous/anxious.             Vitals:    06/05/23 1010   BP: (!) 145/68   BP Location: Left arm   Patient Position: Sitting   Pulse: 76   Temp: 98 °F (36.7 °C)   TempSrc: Oral   SpO2: 98%   Weight: 109.1 kg (240 lb 9.6 oz)   Height: 5' 9" (1.753 m)      Physical Exam  Vitals and nursing note reviewed.   Constitutional:       General: He is not in acute distress.     Appearance: Normal appearance.   HENT:      Head: Normocephalic and atraumatic.      Mouth/Throat:      Mouth: Mucous membranes are moist.   Eyes:      General: No scleral icterus.     Extraocular Movements: Extraocular movements " intact.      Conjunctiva/sclera: Conjunctivae normal.   Neck:      Vascular: No JVD.   Cardiovascular:      Rate and Rhythm: Normal rate and regular rhythm.      Heart sounds: No murmur heard.  Pulmonary:      Effort: Pulmonary effort is normal.      Breath sounds: Normal breath sounds. No wheezing or rhonchi.   Abdominal:      General: Bowel sounds are normal. There is no distension.      Palpations: Abdomen is soft.      Tenderness: There is no abdominal tenderness.   Musculoskeletal:         General: No swelling or deformity.      Cervical back: Neck supple.   Lymphadenopathy:      Head:      Right side of head: No submandibular adenopathy.      Left side of head: No submandibular adenopathy.      Cervical: No cervical adenopathy.      Upper Body:      Right upper body: No supraclavicular or axillary adenopathy.      Left upper body: No supraclavicular or axillary adenopathy.      Lower Body: No right inguinal adenopathy. No left inguinal adenopathy.   Skin:     General: Skin is warm.      Coloration: Skin is not jaundiced.      Findings: No rash.   Neurological:      General: No focal deficit present.      Mental Status: He is alert and oriented to person, place, and time.      Cranial Nerves: Cranial nerves 2-12 are intact.   Psychiatric:         Attention and Perception: Attention normal.         Behavior: Behavior is cooperative.     ECOG SCORE             Laboratory:  CBC with Differential:  Lab Results   Component Value Date    WBC 5.6 11/15/2022    RBC 4.96 11/15/2022    HGB 15.1 11/15/2022    HCT 47.7 11/15/2022    MCV 96.2 (H) 11/15/2022    MCH 30.4 11/15/2022    MCHC 31.7 (L) 11/15/2022    RDW 12.5 11/15/2022     11/15/2022    MPV 8.2 11/15/2022        CMP:  Sodium Level   Date Value Ref Range Status   02/06/2023 138 136 - 145 mmol/L Final     Potassium Level   Date Value Ref Range Status   02/06/2023 4.1 3.5 - 5.1 mmol/L Final     Carbon Dioxide   Date Value Ref Range Status   02/06/2023 28 23 -  31 mmol/L Final     Blood Urea Nitrogen   Date Value Ref Range Status   02/06/2023 15.2 8.4 - 25.7 mg/dL Final     Creatinine   Date Value Ref Range Status   02/06/2023 1.36 (H) 0.73 - 1.18 mg/dL Final     Calcium Level Total   Date Value Ref Range Status   02/06/2023 10.1 (H) 8.8 - 10.0 mg/dL Final     Albumin Level   Date Value Ref Range Status   02/06/2023 4.2 3.4 - 4.8 g/dL Final     Bilirubin Total   Date Value Ref Range Status   02/06/2023 0.7 <=1.5 mg/dL Final     Alkaline Phosphatase   Date Value Ref Range Status   02/06/2023 111 40 - 150 unit/L Final     Aspartate Aminotransferase   Date Value Ref Range Status   02/06/2023 19 5 - 34 unit/L Final     Alanine Aminotransferase   Date Value Ref Range Status   02/06/2023 26 0 - 55 unit/L Final     Estimated GFR-Non    Date Value Ref Range Status   05/03/2022 >60 mls/min/1.73/m2 Final         Assessment:       No diagnosis found.        Plan:       Again, Discussed with the patient today that Multiple myeloma is a type of bone marrow cancer that forms in a type of white blood cell called a plasma cell. Plasma cells help you fight infections by making antibodies that recognize and attack germs but when they transformed into cancer cells then they are not efficient and don't work well. This cells can affect not only the bone marrow but the bones in general causing distinctive bone lesions called Lytic lesions. It also can affect the kidney. Discussed prognosis and treatment recomendations and indications.  Discussed Monoclonal gammopathy of undetermined significance (MGUS) and that is the most common of a spectrum of diseases called plasma cell dyscrasias. The term MGUS denotes the presence of a monoclonal immunoglobulin (Ig), or M-protein, in the serum or urine in persons without evidence of multiple myeloma (MM), Waldenström macroglobulinemia (WM), amyloidosis (AL) or other lymphoproliferative disorders. The risk of progression to MM or other  lymphoproliferative disorder is present at a constant rate throughout the remainder of a patient's life. This risk has been quantified at 1% per year.  I also discussed with the patient that elevated globulin can be associated with autoimmune disorders or recent infections/inflammation.  This is a disorder of the immune system so anything that affect immune system can increase the globulin level.           MM labs to eval MGUS drawn today- pending  RTC in 6 months with MM labs, no urine  Labs will be done @ [facility] 1 week prior    Bone marrow biopsy will be defer for now but may need in the future.  The patient was given ample opportunity to ask questions and they were all answered to satisfaction; patient demonstrated understanding of what we discussed and is agreeable to the plan.          WERO Vasquez

## 2023-06-06 LAB
ALBUMIN % SPEP (OHS): 43.86
ALBUMIN SERPL-MCNC: 3.4 G/DL (ref 3.4–4.8)
ALBUMIN/GLOB SERPL: 0.8 RATIO (ref 1.1–2)
ALPHA 1 GLOB (OHS): 0.24 GM/DL
ALPHA 1 GLOB% (OHS): 3.17
ALPHA 2 GLOB % (OHS): 9.48
ALPHA 2 GLOB (OHS): 0.73 GM/DL
BETA GLOB (OHS): 1.26 GM/DL
BETA GLOB% (OHS): 16.35
GAMMA GLOBULIN % (OHS): 27.13
GAMMA GLOBULIN (OHS): 2.09 GM/DL
GLOBULIN SER-MCNC: 4.3 GM/DL (ref 2.4–3.5)
KAPPA LC FREE SER-MCNC: 3.36 MG/DL (ref 0.33–1.94)
KAPPA LC FREE/LAMBDA FREE SER: 1 {RATIO} (ref 0.26–1.65)
LAMBDA LC FREE SERPL-MCNC: 3.37 MG/DL (ref 0.57–2.63)
M SPIKE % (OHS): ABNORMAL
M SPIKE (OHS): ABNORMAL
PATH REV: NORMAL
PROT SERPL-MCNC: 7.7 GM/DL (ref 5.8–7.6)

## 2023-06-14 DIAGNOSIS — I15.2 HYPERTENSION ASSOCIATED WITH DIABETES: ICD-10-CM

## 2023-06-14 DIAGNOSIS — E78.5 HYPERLIPIDEMIA, UNSPECIFIED HYPERLIPIDEMIA TYPE: Primary | ICD-10-CM

## 2023-06-14 DIAGNOSIS — E11.59 HYPERTENSION ASSOCIATED WITH DIABETES: ICD-10-CM

## 2023-06-14 RX ORDER — OLMESARTAN MEDOXOMIL AND HYDROCHLOROTHIAZIDE 40/25 40; 25 MG/1; MG/1
TABLET ORAL
Qty: 90 TABLET | Refills: 4 | Status: SHIPPED | OUTPATIENT
Start: 2023-06-14 | End: 2024-06-13

## 2023-06-14 RX ORDER — FINASTERIDE 5 MG/1
TABLET, FILM COATED ORAL
Qty: 90 TABLET | Refills: 4 | Status: SHIPPED | OUTPATIENT
Start: 2023-06-14

## 2023-06-14 RX ORDER — METFORMIN HYDROCHLORIDE 1000 MG/1
TABLET ORAL
Qty: 180 TABLET | Refills: 4 | Status: SHIPPED | OUTPATIENT
Start: 2023-06-14

## 2023-08-04 ENCOUNTER — TELEPHONE (OUTPATIENT)
Dept: INTERNAL MEDICINE | Facility: CLINIC | Age: 78
End: 2023-08-04
Payer: MEDICARE

## 2023-08-04 DIAGNOSIS — E55.9 VITAMIN D DEFICIENCY: ICD-10-CM

## 2023-08-04 DIAGNOSIS — E78.5 HYPERLIPIDEMIA, UNSPECIFIED HYPERLIPIDEMIA TYPE: ICD-10-CM

## 2023-08-04 DIAGNOSIS — I15.2 HYPERTENSION ASSOCIATED WITH DIABETES: Primary | ICD-10-CM

## 2023-08-04 DIAGNOSIS — E11.59 HYPERTENSION ASSOCIATED WITH DIABETES: Primary | ICD-10-CM

## 2023-08-04 DIAGNOSIS — E11.42 TYPE 2 DIABETES MELLITUS WITH DIABETIC POLYNEUROPATHY, WITHOUT LONG-TERM CURRENT USE OF INSULIN: ICD-10-CM

## 2023-08-04 NOTE — TELEPHONE ENCOUNTER
----- Message from Gergory Tanner MA sent at 8/4/2023  9:33 AM CDT -----  Regarding: PV 8/11/23 @ 8:00 TREY Husain  1. Are there any outstanding tasks in the patient's chart? Yes, fasting labs    2. Is there any documentation in the chart? No    3.Has patient been seen in an ER, Urgent care clinic, or been admitted since last visit?  If yes, When, where, and why    4. Has patient seen any other healthcare providers since last visit?  If yes, when, where, and why    5. Has patient had any bloodwork or XR done since last visit?    6. Is patient signed up for patient portal?

## 2023-08-09 ENCOUNTER — LAB VISIT (OUTPATIENT)
Dept: LAB | Facility: HOSPITAL | Age: 78
End: 2023-08-09
Attending: INTERNAL MEDICINE
Payer: MEDICARE

## 2023-08-09 DIAGNOSIS — I15.2 HYPERTENSION ASSOCIATED WITH DIABETES: ICD-10-CM

## 2023-08-09 DIAGNOSIS — E11.59 HYPERTENSION ASSOCIATED WITH DIABETES: ICD-10-CM

## 2023-08-09 DIAGNOSIS — E11.42 TYPE 2 DIABETES MELLITUS WITH DIABETIC POLYNEUROPATHY, WITHOUT LONG-TERM CURRENT USE OF INSULIN: ICD-10-CM

## 2023-08-09 DIAGNOSIS — R94.6 ABNORMAL RESULTS OF THYROID FUNCTION STUDIES: ICD-10-CM

## 2023-08-09 DIAGNOSIS — E78.5 HYPERLIPIDEMIA, UNSPECIFIED HYPERLIPIDEMIA TYPE: ICD-10-CM

## 2023-08-09 DIAGNOSIS — E55.9 VITAMIN D DEFICIENCY: ICD-10-CM

## 2023-08-09 DIAGNOSIS — R79.89 ABNORMAL TSH: ICD-10-CM

## 2023-08-09 LAB
ALBUMIN SERPL-MCNC: 4 G/DL (ref 3.4–4.8)
ALBUMIN/GLOB SERPL: 1.1 RATIO (ref 1.1–2)
ALP SERPL-CCNC: 102 UNIT/L (ref 40–150)
ALT SERPL-CCNC: 21 UNIT/L (ref 0–55)
APPEARANCE UR: CLEAR
AST SERPL-CCNC: 16 UNIT/L (ref 5–34)
BACTERIA #/AREA URNS AUTO: NORMAL /HPF
BASOPHILS # BLD AUTO: 0.02 X10(3)/MCL
BASOPHILS NFR BLD AUTO: 0.3 %
BILIRUB SERPL-MCNC: 0.7 MG/DL
BILIRUB UR QL STRIP.AUTO: NEGATIVE
BUN SERPL-MCNC: 8.6 MG/DL (ref 8.4–25.7)
CALCIUM SERPL-MCNC: 10.2 MG/DL (ref 8.8–10)
CHLORIDE SERPL-SCNC: 104 MMOL/L (ref 98–107)
CHOLEST SERPL-MCNC: 169 MG/DL
CHOLEST/HDLC SERPL: 3 {RATIO} (ref 0–5)
CO2 SERPL-SCNC: 28 MMOL/L (ref 23–31)
COLOR UR: YELLOW
CREAT SERPL-MCNC: 1.06 MG/DL (ref 0.73–1.18)
DEPRECATED CALCIDIOL+CALCIFEROL SERPL-MC: 18.6 NG/ML (ref 30–80)
EOSINOPHIL # BLD AUTO: 0.25 X10(3)/MCL (ref 0–0.9)
EOSINOPHIL NFR BLD AUTO: 3.5 %
ERYTHROCYTE [DISTWIDTH] IN BLOOD BY AUTOMATED COUNT: 13.6 % (ref 11.5–17)
EST. AVERAGE GLUCOSE BLD GHB EST-MCNC: 182.9 MG/DL
GFR SERPLBLD CREATININE-BSD FMLA CKD-EPI: >60 MLS/MIN/1.73/M2
GLOBULIN SER-MCNC: 3.6 GM/DL (ref 2.4–3.5)
GLUCOSE SERPL-MCNC: 129 MG/DL (ref 82–115)
GLUCOSE UR QL STRIP.AUTO: NEGATIVE
HBA1C MFR BLD: 8 %
HCT VFR BLD AUTO: 44.5 % (ref 42–52)
HDLC SERPL-MCNC: 61 MG/DL (ref 35–60)
HGB BLD-MCNC: 14.2 G/DL (ref 14–18)
IMM GRANULOCYTES # BLD AUTO: 0.02 X10(3)/MCL (ref 0–0.04)
IMM GRANULOCYTES NFR BLD AUTO: 0.3 %
KETONES UR QL STRIP.AUTO: NEGATIVE
LDLC SERPL CALC-MCNC: 91 MG/DL (ref 50–140)
LEUKOCYTE ESTERASE UR QL STRIP.AUTO: NEGATIVE
LYMPHOCYTES # BLD AUTO: 3.38 X10(3)/MCL (ref 0.6–4.6)
LYMPHOCYTES NFR BLD AUTO: 47.3 %
MCH RBC QN AUTO: 29.7 PG (ref 27–31)
MCHC RBC AUTO-ENTMCNC: 31.9 G/DL (ref 33–36)
MCV RBC AUTO: 93.1 FL (ref 80–94)
MONOCYTES # BLD AUTO: 0.7 X10(3)/MCL (ref 0.1–1.3)
MONOCYTES NFR BLD AUTO: 9.8 %
NEUTROPHILS # BLD AUTO: 2.77 X10(3)/MCL (ref 2.1–9.2)
NEUTROPHILS NFR BLD AUTO: 38.8 %
NITRITE UR QL STRIP.AUTO: NEGATIVE
PH UR STRIP.AUTO: 5.5 [PH]
PLATELET # BLD AUTO: 261 X10(3)/MCL (ref 130–400)
PMV BLD AUTO: 8 FL (ref 7.4–10.4)
POTASSIUM SERPL-SCNC: 4 MMOL/L (ref 3.5–5.1)
PROT SERPL-MCNC: 7.6 GM/DL (ref 5.8–7.6)
PROT UR QL STRIP.AUTO: NEGATIVE
RBC # BLD AUTO: 4.78 X10(6)/MCL (ref 4.7–6.1)
RBC #/AREA URNS AUTO: NORMAL /HPF
RBC UR QL AUTO: ABNORMAL
SODIUM SERPL-SCNC: 140 MMOL/L (ref 136–145)
SP GR UR STRIP.AUTO: 1.02
SQUAMOUS #/AREA URNS AUTO: NORMAL /HPF
TRIGL SERPL-MCNC: 87 MG/DL (ref 34–140)
TSH SERPL-ACNC: 5.57 UIU/ML (ref 0.35–4.94)
UROBILINOGEN UR STRIP-ACNC: 0.2
VLDLC SERPL CALC-MCNC: 17 MG/DL
WBC # SPEC AUTO: 7.14 X10(3)/MCL (ref 4.5–11.5)
WBC #/AREA URNS AUTO: NORMAL /HPF

## 2023-08-09 PROCEDURE — 80053 COMPREHEN METABOLIC PANEL: CPT

## 2023-08-09 PROCEDURE — 85025 COMPLETE CBC W/AUTO DIFF WBC: CPT

## 2023-08-09 PROCEDURE — 81001 URINALYSIS AUTO W/SCOPE: CPT

## 2023-08-09 PROCEDURE — 84443 ASSAY THYROID STIM HORMONE: CPT

## 2023-08-09 PROCEDURE — 80061 LIPID PANEL: CPT

## 2023-08-09 PROCEDURE — 82306 VITAMIN D 25 HYDROXY: CPT

## 2023-08-09 PROCEDURE — 36415 COLL VENOUS BLD VENIPUNCTURE: CPT

## 2023-08-09 PROCEDURE — 84439 ASSAY OF FREE THYROXINE: CPT

## 2023-08-09 PROCEDURE — 83036 HEMOGLOBIN GLYCOSYLATED A1C: CPT

## 2023-08-10 PROBLEM — Z00.00 WELL ADULT EXAM: Status: ACTIVE | Noted: 2023-08-10

## 2023-08-10 NOTE — ASSESSMENT & PLAN NOTE
Poorly controlled.   Increase Metoprolol to 100mg ER daily + Amlodipine 10mg daily + Olmesartan 40mg/HCTZ 25mg daily   Low Sodium Diet (DASH Diet - Less than 2 grams of sodium per day).  Monitor blood pressure daily and log. Report consistent numbers greater than 140/90.  Maintain healthy weight with goal BMI <30. Exercise 30 minutes per day, 5 days per week.

## 2023-08-10 NOTE — PROGRESS NOTES
Patient ID: [id number]     Chief Complaint: Medicare Annual Wellness     HPI:     Galdino Joshi is a 77 y.o. male here today for a Medicare Annual Wellness visit and comprehensive Health Risk Assessment. Reviewed and discussed lab results. LDL not quite at goal. Compliant with daily statin, which he tolerates well. Also mildly elevated TSH and Ca - will add FT4 and PTH today. Hypertensive today - reports home readings in the 140s/60-70s. HA1C not at goal. Reports that he is taking Metformin and Rybelsus but dispense history does not include rybelsus. Not following strict ADA diet. Discussed addition of Mounjaro which he is amenable to today. No complaints today.      A separate E/M code has been provided to evaluate additional complaints that the patient would like addressed during the dedicated Medicare Wellness Exam.    Health Maintenance   Topic Date Due    Hepatitis C Screening  Never done    Eye Exam  09/15/2021    Foot Exam  07/06/2023    Hemoglobin A1c  11/09/2023    Lipid Panel  08/09/2024    Low Dose Statin  08/11/2024    TETANUS VACCINE  08/11/2033        Past Medical History:   Diagnosis Date    BPH (benign prostatic hyperplasia)     Essential (primary) hypertension     Mixed hyperlipidemia     Proteinuria     Type 2 diabetes mellitus without complication, without long-term current use of insulin         Past Surgical History:   Procedure Laterality Date    CARPAL TUNNEL RELEASE N/A     CARPAL TUNNEL RELEASE N/A     neuroplasty median nerve at carpal tunnel  Right     RELEASE OF ULNAR NERVE AT CUBITAL TUNNEL Right     SPINAL FUSION          Social History     Socioeconomic History    Marital status:    Tobacco Use    Smoking status: Never    Smokeless tobacco: Current     Types: Chew   Substance and Sexual Activity    Alcohol use: Yes     Alcohol/week: 35.0 standard drinks of alcohol     Types: 35 Cans of beer per week    Drug use: Never    Sexual activity: Not Currently   Social History  Narrative    Pt lives with wife in home and has help from daughter     Social Determinants of Health     Financial Resource Strain: Low Risk  (5/6/2022)    Overall Financial Resource Strain (CARDIA)     Difficulty of Paying Living Expenses: Not hard at all   Food Insecurity: No Food Insecurity (5/6/2022)    Hunger Vital Sign     Worried About Running Out of Food in the Last Year: Never true     Ran Out of Food in the Last Year: Never true   Transportation Needs: No Transportation Needs (5/6/2022)    PRAPARE - Transportation     Lack of Transportation (Medical): No     Lack of Transportation (Non-Medical): No   Physical Activity: Sufficiently Active (5/6/2022)    Exercise Vital Sign     Days of Exercise per Week: 7 days     Minutes of Exercise per Session: 60 min   Social Connections: Socially Integrated (5/6/2022)    Social Connection and Isolation Panel [NHANES]     Frequency of Communication with Friends and Family: More than three times a week     Frequency of Social Gatherings with Friends and Family: Once a week     Attends Gnosticist Services: More than 4 times per year     Active Member of Clubs or Organizations: No     Attends Club or Organization Meetings: 1 to 4 times per year     Marital Status:    Housing Stability: Unknown (5/6/2022)    Housing Stability Vital Sign     Unable to Pay for Housing in the Last Year: No     Unstable Housing in the Last Year: No        Family History   Problem Relation Age of Onset    Heart disease Mother         Current Outpatient Medications   Medication Instructions    amLODIPine (NORVASC) 10 mg, Oral, Daily    atorvastatin (LIPITOR) 80 mg, Oral, Nightly    cholecalciferol (vitamin D3) 50,000 Units, Oral, Weekly    colchicine (COLCRYS) 0.6 mg tablet 1.2 mg p.o. x1 then 0.6 mg p.o. 1 hour later x1    ergocalciferol (ERGOCALCIFEROL) 50,000 unit Cap TAKE 1 CAPSULE EVERY WEEK    finasteride (PROSCAR) 5 mg tablet TAKE 1 TABLET EVERY DAY    gabapentin (NEURONTIN) 300 mg,  "Oral, 3 times daily PRN    metFORMIN (GLUCOPHAGE) 1000 MG tablet TAKE 1 TABLET TWICE DAILY    metoprolol succinate (TOPROL-XL) 100 mg, Oral, Daily    MOUNJARO 5 mg, Subcutaneous, Every 7 days    olmesartan-hydrochlorothiazide (BENICAR HCT) 40-25 mg per tablet TAKE 1 TABLET EVERY DAY       Review of patient's allergies indicates:  No Known Allergies     Immunization History   Administered Date(s) Administered    COVID-19, MRNA, LN-S, PF (Pfizer) (Purple Cap) 01/14/2021, 02/04/2021, 10/08/2021, 04/07/2022    COVID-19, mRNA, LNP-S, bivalent booster, PF (PFIZER OMICRON) 09/19/2022    Influenza (FLUAD) - Quadrivalent - Adjuvanted - PF *Preferred* (65+) 11/07/2022    Influenza - Trivalent - PF (ADULT) 09/18/2017, 01/18/2019, 11/19/2020, 10/19/2021    Pneumococcal Conjugate - 13 Valent 01/18/2018    Pneumococcal Polysaccharide - 23 Valent 07/24/2019    Td (ADULT) 08/11/2023        Patient Care Team:  Francesco Salomon MD as PCP - General (Internal Medicine)  Fernandez hSarp MD as Consulting Physician (Urology)  Kristen Summers as Consulting Physician (Optometry)  Dwayne Adorno MD as Consulting Physician (Cardiology)  Preeti Dawkins MD as Consulting Physician (Hematology and Oncology)    Subjective:     Review of Systems    12 point review of systems conducted, negative except as stated in the history of present illness. See HPI for details.    Objective:     Visit Vitals  BP (!) 152/70 (BP Location: Left arm, Patient Position: Sitting, BP Method: Large (Manual))   Pulse 90   Resp 14   Ht 5' 9" (1.753 m)   Wt 108.9 kg (240 lb)   SpO2 96%   BMI 35.44 kg/m²       Physical Exam  Vitals and nursing note reviewed.   Constitutional:       General: He is not in acute distress.     Appearance: He is not ill-appearing.   HENT:      Head: Normocephalic and atraumatic.      Mouth/Throat:      Mouth: Mucous membranes are moist.      Pharynx: Oropharynx is clear.   Eyes:      General: No scleral icterus.     Extraocular " Movements: Extraocular movements intact.      Conjunctiva/sclera: Conjunctivae normal.      Pupils: Pupils are equal, round, and reactive to light.   Neck:      Vascular: No carotid bruit.   Cardiovascular:      Rate and Rhythm: Normal rate and regular rhythm.      Pulses:           Dorsalis pedis pulses are 2+ on the right side and 2+ on the left side.        Posterior tibial pulses are 2+ on the right side and 2+ on the left side.      Heart sounds: No murmur heard.     No friction rub. No gallop.   Pulmonary:      Effort: Pulmonary effort is normal. No respiratory distress.      Breath sounds: Normal breath sounds. No wheezing, rhonchi or rales.   Abdominal:      General: Abdomen is flat. Bowel sounds are normal. There is no distension.      Palpations: Abdomen is soft. There is no mass.      Tenderness: There is no abdominal tenderness.   Musculoskeletal:         General: Normal range of motion.      Cervical back: Normal range of motion and neck supple.      Right foot: No deformity.      Left foot: No deformity.   Feet:      Right foot:      Protective Sensation: 5 sites tested.  5 sites sensed.      Skin integrity: Skin integrity normal. No ulcer, blister, skin breakdown, erythema, warmth, callus, dry skin or fissure.      Toenail Condition: Right toenails are normal.      Left foot:      Protective Sensation: 5 sites tested.  5 sites sensed.      Skin integrity: Skin integrity normal. No ulcer, blister, skin breakdown, erythema, warmth, callus, dry skin or fissure.      Toenail Condition: Left toenails are normal.   Skin:     General: Skin is warm and dry.   Neurological:      General: No focal deficit present.      Mental Status: He is alert.   Psychiatric:         Mood and Affect: Mood normal.         Labs Reviewed:     Chemistry:  Lab Results   Component Value Date     08/09/2023    K 4.0 08/09/2023    CHLORIDE 104 08/09/2023    BUN 8.6 08/09/2023    CREATININE 1.06 08/09/2023    EGFRNORACEVR >60  08/09/2023    GLUCOSE 129 (H) 08/09/2023    CALCIUM 10.2 (H) 08/09/2023    ALKPHOS 102 08/09/2023    LABPROT 7.6 08/09/2023    ALBUMIN 4.0 08/09/2023    BILIDIR 0.2 05/03/2022    IBILI 0.30 05/03/2022    AST 16 08/09/2023    ALT 21 08/09/2023    OZQXCDDM42UP 18.6 (L) 08/09/2023    TSH 5.566 (H) 08/09/2023    TFROIY5SVSC 1.02 08/09/2023    PSA 7.49 (H) 05/03/2022        Lab Results   Component Value Date    HGBA1C 8.0 (H) 08/09/2023        Hematology:  Lab Results   Component Value Date    WBC 7.14 08/09/2023    HGB 14.2 08/09/2023    HCT 44.5 08/09/2023     08/09/2023       Lipid Panel:  Lab Results   Component Value Date    CHOL 169 08/09/2023    HDL 61 (H) 08/09/2023    LDL 91.00 08/09/2023    TRIG 87 08/09/2023    TOTALCHOLEST 3 08/09/2023        Urine:  Lab Results   Component Value Date    COLORUA Yellow 08/09/2023    APPEARANCEUA Clear 08/09/2023    SGUA 1.020 08/09/2023    PHUA 5.5 08/09/2023    PROTEINUA Negative 08/09/2023    GLUCOSEUA Negative 08/09/2023    KETONESUA Negative 08/09/2023    BLOODUA Trace-Lysed (A) 08/09/2023    NITRITESUA Negative 08/09/2023    LEUKOCYTESUR Negative 08/09/2023    RBCUA None Seen 08/09/2023    WBCUA None Seen 08/09/2023    BACTERIA None Seen 08/09/2023    CREATRANDUR 46.9 (L) 02/06/2023        Assessment:       ICD-10-CM ICD-9-CM   1. Well adult exam  Z00.00 V70.0   2. Type 2 diabetes mellitus with diabetic polyneuropathy, without long-term current use of insulin  E11.42 250.60     357.2   3. Hypertension associated with diabetes  E11.59 250.80    I15.2 401.9   4. Mixed hyperlipidemia  E78.2 272.2   5. Monoclonal gammopathy of unknown significance (MGUS)  D47.2 273.1   6. BPH with elevated PSA  N40.0 600.00    R97.20 790.93   7. Chronic kidney disease, stage 3a  N18.31 585.3   8. Chronic gout without tophus, unspecified cause, unspecified site  M1A.9XX0 274.02   9. Hyperlipidemia, unspecified hyperlipidemia type  E78.5 272.4   10. Abnormal TSH  R79.89 790.6   11.  Hypercalcemia  E83.52 275.42   12. Abnormal results of thyroid function studies  R94.6 794.5   13. Need for tetanus booster  Z23 V03.7   14. Abrasion, left ankle, initial encounter  S90.512A 916.0        Plan:     1. Well adult exam  Overview:  CVD Risk Factors - Reviewed  Obesity/Physical Activity - Encouraged daily 30 minute physical activity x 5 days per week.    Prostate Cancer Screening - Followed by Dr. Sharp. Follow up annually.  Colon Cancer Screening - No prior screening - declines any screening, even Cologuard.    Osteoporosis Screening - No prior screening.   Eye Exam - Recommend annually - Dr. Summers  Dental Exam - Recommend biannually  Vaccinations -   Immunization History   Administered Date(s) Administered    COVID-19, MRNA, LN-S, PF (Pfizer) (Purple Cap) 01/14/2021, 02/04/2021, 10/08/2021, 04/07/2022    COVID-19, mRNA, LNP-S, bivalent booster, PF (PFIZER OMICRON) 09/19/2022    Influenza (FLUAD) - Quadrivalent - Adjuvanted - PF *Preferred* (65+) 11/07/2022    Influenza - Trivalent - PF (ADULT) 09/18/2017, 01/18/2019, 11/19/2020, 10/19/2021    Pneumococcal Conjugate - 13 Valent 01/18/2018    Pneumococcal Polysaccharide - 23 Valent 07/24/2019            2. Type 2 diabetes mellitus with diabetic polyneuropathy, without long-term current use of insulin  Assessment & Plan:  Lab Results   Component Value Date    HGBA1C 8.0 (H) 08/09/2023    HGBA1C 6.7 02/06/2023    LDL 91.00 08/09/2023    CREATININE 1.06 08/09/2023      Continue Metformin 1000mg BID + switch from Rybelsus 14mg daily to Mounjaro 2.5mg weekly x 4 weeks. Follow up in 4 weeks and will titrate up if tolerating well.   On ARB and Statin according to guidelines..  Follow ADA Diet. Avoid soda, simple sweets, and limit rice/pasta/breads/starches (no more than 45-50 grams per meal).  Maintain healthy weight with goal BMI <30.  Exercise 5 times per week for 30 minutes per day.  Stressed importance of daily foot exams.  Stressed importance of annual  dilated eye exam.    Orders:  -     Hemoglobin A1C; Future; Expected date: 11/11/2023  -     Comprehensive Metabolic Panel; Future; Expected date: 11/11/2023    3. Hypertension associated with diabetes  Assessment & Plan:  Poorly controlled.   Increase Metoprolol to 100mg ER daily + Amlodipine 10mg daily + Olmesartan 40mg/HCTZ 25mg daily   Low Sodium Diet (DASH Diet - Less than 2 grams of sodium per day).  Monitor blood pressure daily and log. Report consistent numbers greater than 140/90.  Maintain healthy weight with goal BMI <30. Exercise 30 minutes per day, 5 days per week.      4. Mixed hyperlipidemia  Assessment & Plan:  Lab Results   Component Value Date    LDL 91.00 08/09/2023    TRIG 87 08/09/2023    HDL 61 (H) 08/09/2023    TOTALCHOLEST 3 08/09/2023     Increase Atorvastatin to 80mg daily. Goal LDL < 70. Repeat in 3 months.  Stressed importance of dietary modifications. Follow a low cholesterol, low saturated fat diet with less that 200mg of cholesterol a day.  Avoid fried foods and high saturated fats (high saturated fats less than 7% of calories).  Add Flax Seed/Fish Oil supplements to diet. Increase dietary fiber.  Regular exercise can reduce LDL and raise HDL. Stressed importance of physical activity 5 times per week for 30 minutes per day.       Orders:  -     Lipid Panel; Future; Expected date: 11/11/2023    5. Monoclonal gammopathy of unknown significance (MGUS)  Overview:  Followed by Dr. Dawkins      6. BPH with elevated PSA  Overview:  Followed by Dr. Sharp, long history of elevated PSA, benign biopsy. Has f/u every 6 months      7. Chronic kidney disease, stage 3a  Assessment & Plan:  Lab Results   Component Value Date    EGFRNORACEVR >60 08/09/2023    EGFRNORACEVR >60 06/05/2023     Stable from renal standpoint.  Follow renoprotective measures including Renal Diet (reduce intake of nuts, peanut butter, milk, cheese, dried beans, peas) and Low Sodium Diet (less than 2 grams per day).  Avoid  NSAIDs (Aleve, Mobic, Celebrex, Ibuprofen, Advil, Toradol and Diclofenac). May take Tylenol as needed for headache/pain.  Control DM with goal A1C <7. BP goal <130/80. LDL goal < 100.  Stay well hydrated. Avoid alcohol and soda. Limit tea and coffee.        8. Chronic gout without tophus, unspecified cause, unspecified site  Assessment & Plan:  Continue Colcrys 0.6mg PRN  Stay well hydrated by increasing water intake throughout the day.  Stressed importance of exercise and weight loss to maintain BMI <30.  Avoid alcohol, sodas, organ/glandular meats (liver, kidney, sweetbreads). Limit seafood and red meat intake.  Eat a balanced diet of fruits, vegetables, complex carbohydrates, and lean sources of protein (boneless/skinless chicken breasts, salmon, lentils, low fat dairy).  Discussed possible benefit of OTC Vitamin C supplementation.         9. Hyperlipidemia, unspecified hyperlipidemia type  Assessment & Plan:  Lab Results   Component Value Date    LDL 91.00 08/09/2023    TRIG 87 08/09/2023    HDL 61 (H) 08/09/2023    TOTALCHOLEST 3 08/09/2023     Increase Atorvastatin to 80mg daily. Goal LDL < 70. Repeat in 3 months.  Stressed importance of dietary modifications. Follow a low cholesterol, low saturated fat diet with less that 200mg of cholesterol a day.  Avoid fried foods and high saturated fats (high saturated fats less than 7% of calories).  Add Flax Seed/Fish Oil supplements to diet. Increase dietary fiber.  Regular exercise can reduce LDL and raise HDL. Stressed importance of physical activity 5 times per week for 30 minutes per day.       Orders:  -     atorvastatin (LIPITOR) 80 MG tablet; Take 1 tablet (80 mg total) by mouth every evening.  Dispense: 90 tablet; Refill: 3    10. Abnormal TSH  -     T4, Free; Future; Expected date: 08/11/2023  -     PTH, Intact; Future; Expected date: 08/11/2023    11. Hypercalcemia  -     PTH, Intact; Future; Expected date: 08/11/2023    12. Abnormal results of thyroid  function studies  -     T4, Free; Future; Expected date: 08/11/2023    13. Need for tetanus booster  -     Discontinue: diptheria-tetanus toxoids 2-2 Lf unit/0.5 mL injection 0.5 mL  -     Td Vaccine (Adult)    14. Abrasion, left ankle, initial encounter  -     Td Vaccine (Adult)    Other orders  -     metoprolol succinate (TOPROL-XL) 100 MG 24 hr tablet; Take 1 tablet (100 mg total) by mouth once daily.  Dispense: 90 tablet; Refill: 3  -     cholecalciferol, vitamin D3, 1,250 mcg (50,000 unit) capsule; Take 1 capsule (50,000 Units total) by mouth once a week.  Dispense: 8 capsule; Refill: 0  -     tirzepatide (MOUNJARO) 5 mg/0.5 mL PnIj; Inject 5 mg into the skin every 7 days.  Dispense: 4 pen ; Refill: 5         The following assessments were completed and reviewed. See completed screening forms and assessments within the Encounter Summary.  [x] Health Risk Assessment   [x] CVD Risk Factors - Reviewed  [x] Obesity/Physical Activity -  Encouraged daily 30 minute physical activity x 5 days per week.   [x] Home Safety/Living Situation  [x] CAGE  [x] Depression (PHQ) Screen  [x] Timed Get Up and Go  [x] Whisper Test  [x] Cognitive Function/Impairment Screen  [x] Nutrition Screening  [x] ADL Screen  [x] Opioid Screen:  [x] Patient does not have a prescription for opioids.   [] Patient has a prescription for opioids but is at low risk for abuse.   [x] Substance Abuse Screen:   [x] Patient does not use substances.   [x] Advanced Care Planning:  Advance Care Planning   Date: 08/10/2023    [facility]  I engaged the patient in a voluntary conversation about advance care planning and we specifically addressed what the goals of care would be moving forward.  We did specifically address the patient's likely prognosis, which is good .  We explored the patient's values and preferences for future care.  The patient endorses that what is most important right now is to focus on remaining as independent as possible             Provided  patient with a 5-10 year written screening schedule and personal prevention plan. Recommendations were developed using the USPSTF age appropriate recommendations. Education, counseling, and referrals were provided as needed. After Visit Summary printed and given to patient, which includes a list of additional screenings\tests needed.    Follow up in about 4 weeks (around 9/8/2023) for Diabetes Follow Up. In addition to their scheduled follow up, the patient has also been instructed to follow up on as needed basis.     Future Appointments   Date Time Provider Department Center   9/18/2023  2:00 PM Francesco Salomon MD [facility] [id number]   11/13/2023 10:00 AM [facility]        TREY Taylor

## 2023-08-10 NOTE — ASSESSMENT & PLAN NOTE
Lab Results   Component Value Date    HGBA1C 8.0 (H) 08/09/2023    HGBA1C 6.7 02/06/2023    LDL 91.00 08/09/2023    CREATININE 1.06 08/09/2023      Continue Metformin 1000mg BID + switch from Rybelsus 14mg daily to Mounjaro 2.5mg weekly x 4 weeks. Follow up in 4 weeks and will titrate up if tolerating well.   On ARB and Statin according to guidelines..  Follow ADA Diet. Avoid soda, simple sweets, and limit rice/pasta/breads/starches (no more than 45-50 grams per meal).  Maintain healthy weight with goal BMI <30.  Exercise 5 times per week for 30 minutes per day.  Stressed importance of daily foot exams.  Stressed importance of annual dilated eye exam.

## 2023-08-10 NOTE — ASSESSMENT & PLAN NOTE
Lab Results   Component Value Date    EGFRNORACEVR >60 08/09/2023    EGFRNORACEVR >60 06/05/2023     Stable from renal standpoint.  Follow renoprotective measures including Renal Diet (reduce intake of nuts, peanut butter, milk, cheese, dried beans, peas) and Low Sodium Diet (less than 2 grams per day).  Avoid NSAIDs (Aleve, Mobic, Celebrex, Ibuprofen, Advil, Toradol and Diclofenac). May take Tylenol as needed for headache/pain.  Control DM with goal A1C <7. BP goal <130/80. LDL goal < 100.  Stay well hydrated. Avoid alcohol and soda. Limit tea and coffee.

## 2023-08-10 NOTE — ASSESSMENT & PLAN NOTE
Lab Results   Component Value Date    LDL 91.00 08/09/2023    TRIG 87 08/09/2023    HDL 61 (H) 08/09/2023    TOTALCHOLEST 3 08/09/2023     Increase Atorvastatin to 80mg daily. Goal LDL < 70. Repeat in 3 months.  Stressed importance of dietary modifications. Follow a low cholesterol, low saturated fat diet with less that 200mg of cholesterol a day.  Avoid fried foods and high saturated fats (high saturated fats less than 7% of calories).  Add Flax Seed/Fish Oil supplements to diet. Increase dietary fiber.  Regular exercise can reduce LDL and raise HDL. Stressed importance of physical activity 5 times per week for 30 minutes per day.

## 2023-08-10 NOTE — ASSESSMENT & PLAN NOTE
Continue Colcrys 0.6mg PRN  Stay well hydrated by increasing water intake throughout the day.  Stressed importance of exercise and weight loss to maintain BMI <30.  Avoid alcohol, sodas, organ/glandular meats (liver, kidney, sweetbreads). Limit seafood and red meat intake.  Eat a balanced diet of fruits, vegetables, complex carbohydrates, and lean sources of protein (boneless/skinless chicken breasts, salmon, lentils, low fat dairy).  Discussed possible benefit of OTC Vitamin C supplementation.

## 2023-08-11 ENCOUNTER — OFFICE VISIT (OUTPATIENT)
Dept: INTERNAL MEDICINE | Facility: CLINIC | Age: 78
End: 2023-08-11
Payer: MEDICARE

## 2023-08-11 VITALS
HEART RATE: 90 BPM | SYSTOLIC BLOOD PRESSURE: 152 MMHG | WEIGHT: 240 LBS | OXYGEN SATURATION: 96 % | BODY MASS INDEX: 35.55 KG/M2 | RESPIRATION RATE: 14 BRPM | HEIGHT: 69 IN | DIASTOLIC BLOOD PRESSURE: 70 MMHG

## 2023-08-11 DIAGNOSIS — S90.512A ABRASION, LEFT ANKLE, INITIAL ENCOUNTER: ICD-10-CM

## 2023-08-11 DIAGNOSIS — M1A.9XX0 CHRONIC GOUT WITHOUT TOPHUS, UNSPECIFIED CAUSE, UNSPECIFIED SITE: ICD-10-CM

## 2023-08-11 DIAGNOSIS — D47.2 MONOCLONAL GAMMOPATHY OF UNKNOWN SIGNIFICANCE (MGUS): ICD-10-CM

## 2023-08-11 DIAGNOSIS — N18.31 CHRONIC KIDNEY DISEASE, STAGE 3A: ICD-10-CM

## 2023-08-11 DIAGNOSIS — N40.0 BPH WITH ELEVATED PSA: ICD-10-CM

## 2023-08-11 DIAGNOSIS — E78.2 MIXED HYPERLIPIDEMIA: ICD-10-CM

## 2023-08-11 DIAGNOSIS — R79.89 ABNORMAL TSH: ICD-10-CM

## 2023-08-11 DIAGNOSIS — E11.59 HYPERTENSION ASSOCIATED WITH DIABETES: ICD-10-CM

## 2023-08-11 DIAGNOSIS — E83.52 HYPERCALCEMIA: ICD-10-CM

## 2023-08-11 DIAGNOSIS — R97.20 BPH WITH ELEVATED PSA: ICD-10-CM

## 2023-08-11 DIAGNOSIS — E11.42 TYPE 2 DIABETES MELLITUS WITH DIABETIC POLYNEUROPATHY, WITHOUT LONG-TERM CURRENT USE OF INSULIN: ICD-10-CM

## 2023-08-11 DIAGNOSIS — I15.2 HYPERTENSION ASSOCIATED WITH DIABETES: ICD-10-CM

## 2023-08-11 DIAGNOSIS — E78.5 HYPERLIPIDEMIA, UNSPECIFIED HYPERLIPIDEMIA TYPE: ICD-10-CM

## 2023-08-11 DIAGNOSIS — Z23 NEED FOR TETANUS BOOSTER: ICD-10-CM

## 2023-08-11 DIAGNOSIS — R94.6 ABNORMAL RESULTS OF THYROID FUNCTION STUDIES: ICD-10-CM

## 2023-08-11 DIAGNOSIS — Z00.00 WELL ADULT EXAM: Primary | ICD-10-CM

## 2023-08-11 LAB — T4 FREE SERPL-MCNC: 1.02 NG/DL (ref 0.7–1.48)

## 2023-08-11 PROCEDURE — 3078F PR MOST RECENT DIASTOLIC BLOOD PRESSURE < 80 MM HG: ICD-10-PCS | Mod: CPTII,,, | Performed by: NURSE PRACTITIONER

## 2023-08-11 PROCEDURE — 90471 TD VACCINE GREATER THAN OR EQUAL TO 7YO WITH PRESERVATIVE IM: ICD-10-PCS | Mod: ,,, | Performed by: NURSE PRACTITIONER

## 2023-08-11 PROCEDURE — 3077F PR MOST RECENT SYSTOLIC BLOOD PRESSURE >= 140 MM HG: ICD-10-PCS | Mod: CPTII,,, | Performed by: NURSE PRACTITIONER

## 2023-08-11 PROCEDURE — 3078F DIAST BP <80 MM HG: CPT | Mod: CPTII,,, | Performed by: NURSE PRACTITIONER

## 2023-08-11 PROCEDURE — 90714 TD VACC NO PRESV 7 YRS+ IM: CPT | Mod: ,,, | Performed by: NURSE PRACTITIONER

## 2023-08-11 PROCEDURE — G0439 PPPS, SUBSEQ VISIT: HCPCS | Mod: ,,, | Performed by: NURSE PRACTITIONER

## 2023-08-11 PROCEDURE — 1159F PR MEDICATION LIST DOCUMENTED IN MEDICAL RECORD: ICD-10-PCS | Mod: CPTII,,, | Performed by: NURSE PRACTITIONER

## 2023-08-11 PROCEDURE — 1101F PR PT FALLS ASSESS DOC 0-1 FALLS W/OUT INJ PAST YR: ICD-10-PCS | Mod: CPTII,,, | Performed by: NURSE PRACTITIONER

## 2023-08-11 PROCEDURE — 3077F SYST BP >= 140 MM HG: CPT | Mod: CPTII,,, | Performed by: NURSE PRACTITIONER

## 2023-08-11 PROCEDURE — 1158F ADVNC CARE PLAN TLK DOCD: CPT | Mod: CPTII,,, | Performed by: NURSE PRACTITIONER

## 2023-08-11 PROCEDURE — 1158F PR ADVANCE CARE PLANNING DISCUSS DOCUMENTED IN MEDICAL RECORD: ICD-10-PCS | Mod: CPTII,,, | Performed by: NURSE PRACTITIONER

## 2023-08-11 PROCEDURE — 1160F RVW MEDS BY RX/DR IN RCRD: CPT | Mod: CPTII,,, | Performed by: NURSE PRACTITIONER

## 2023-08-11 PROCEDURE — G0439 PR MEDICARE ANNUAL WELLNESS SUBSEQUENT VISIT: ICD-10-PCS | Mod: ,,, | Performed by: NURSE PRACTITIONER

## 2023-08-11 PROCEDURE — 1160F PR REVIEW ALL MEDS BY PRESCRIBER/CLIN PHARMACIST DOCUMENTED: ICD-10-PCS | Mod: CPTII,,, | Performed by: NURSE PRACTITIONER

## 2023-08-11 PROCEDURE — 1101F PT FALLS ASSESS-DOCD LE1/YR: CPT | Mod: CPTII,,, | Performed by: NURSE PRACTITIONER

## 2023-08-11 PROCEDURE — 90471 IMMUNIZATION ADMIN: CPT | Mod: ,,, | Performed by: NURSE PRACTITIONER

## 2023-08-11 PROCEDURE — 3288F PR FALLS RISK ASSESSMENT DOCUMENTED: ICD-10-PCS | Mod: CPTII,,, | Performed by: NURSE PRACTITIONER

## 2023-08-11 PROCEDURE — 90714 TD VACCINE GREATER THAN OR EQUAL TO 7YO WITH PRESERVATIVE IM: ICD-10-PCS | Mod: ,,, | Performed by: NURSE PRACTITIONER

## 2023-08-11 PROCEDURE — 1126F PR PAIN SEVERITY QUANTIFIED, NO PAIN PRESENT: ICD-10-PCS | Mod: CPTII,,, | Performed by: NURSE PRACTITIONER

## 2023-08-11 PROCEDURE — 1126F AMNT PAIN NOTED NONE PRSNT: CPT | Mod: CPTII,,, | Performed by: NURSE PRACTITIONER

## 2023-08-11 PROCEDURE — 3052F PR MOST RECENT HEMOGLOBIN A1C LEVEL 8.0 - < 9.0%: ICD-10-PCS | Mod: CPTII,,, | Performed by: NURSE PRACTITIONER

## 2023-08-11 PROCEDURE — 3052F HG A1C>EQUAL 8.0%<EQUAL 9.0%: CPT | Mod: CPTII,,, | Performed by: NURSE PRACTITIONER

## 2023-08-11 PROCEDURE — 3288F FALL RISK ASSESSMENT DOCD: CPT | Mod: CPTII,,, | Performed by: NURSE PRACTITIONER

## 2023-08-11 PROCEDURE — 1159F MED LIST DOCD IN RCRD: CPT | Mod: CPTII,,, | Performed by: NURSE PRACTITIONER

## 2023-08-11 RX ORDER — ASPIRIN 325 MG
50000 TABLET, DELAYED RELEASE (ENTERIC COATED) ORAL WEEKLY
Qty: 8 CAPSULE | Refills: 0 | Status: SHIPPED | OUTPATIENT
Start: 2023-08-11

## 2023-08-11 RX ORDER — METOPROLOL SUCCINATE 100 MG/1
100 TABLET, EXTENDED RELEASE ORAL DAILY
Qty: 90 TABLET | Refills: 3 | Status: SHIPPED | OUTPATIENT
Start: 2023-08-11

## 2023-08-11 RX ORDER — TIRZEPATIDE 5 MG/.5ML
5 INJECTION, SOLUTION SUBCUTANEOUS
Qty: 4 PEN | Refills: 5 | Status: SHIPPED | OUTPATIENT
Start: 2023-08-11 | End: 2023-09-18 | Stop reason: SDUPTHER

## 2023-08-11 RX ORDER — ATORVASTATIN CALCIUM 80 MG/1
80 TABLET, FILM COATED ORAL NIGHTLY
Qty: 90 TABLET | Refills: 3 | Status: SHIPPED | OUTPATIENT
Start: 2023-08-11

## 2023-08-14 ENCOUNTER — LAB VISIT (OUTPATIENT)
Dept: LAB | Facility: HOSPITAL | Age: 78
End: 2023-08-14
Attending: NURSE PRACTITIONER
Payer: MEDICARE

## 2023-08-14 DIAGNOSIS — E78.2 MIXED HYPERLIPIDEMIA: ICD-10-CM

## 2023-08-14 DIAGNOSIS — E83.52 HYPERCALCEMIA: ICD-10-CM

## 2023-08-14 DIAGNOSIS — R79.89 ABNORMAL TSH: ICD-10-CM

## 2023-08-14 DIAGNOSIS — E11.42 TYPE 2 DIABETES MELLITUS WITH DIABETIC POLYNEUROPATHY, WITHOUT LONG-TERM CURRENT USE OF INSULIN: ICD-10-CM

## 2023-08-14 LAB
ALBUMIN SERPL-MCNC: 4.1 G/DL (ref 3.4–4.8)
ALBUMIN/GLOB SERPL: 1.1 RATIO (ref 1.1–2)
ALP SERPL-CCNC: 116 UNIT/L (ref 40–150)
ALT SERPL-CCNC: 21 UNIT/L (ref 0–55)
AST SERPL-CCNC: 18 UNIT/L (ref 5–34)
BILIRUB SERPL-MCNC: 0.7 MG/DL
BUN SERPL-MCNC: 12.2 MG/DL (ref 8.4–25.7)
CALCIUM SERPL-MCNC: 10.4 MG/DL (ref 8.8–10)
CHLORIDE SERPL-SCNC: 103 MMOL/L (ref 98–107)
CHOLEST SERPL-MCNC: 178 MG/DL
CHOLEST/HDLC SERPL: 3 {RATIO} (ref 0–5)
CO2 SERPL-SCNC: 26 MMOL/L (ref 23–31)
CREAT SERPL-MCNC: 1.13 MG/DL (ref 0.73–1.18)
EST. AVERAGE GLUCOSE BLD GHB EST-MCNC: 177.2 MG/DL
GFR SERPLBLD CREATININE-BSD FMLA CKD-EPI: >60 MLS/MIN/1.73/M2
GLOBULIN SER-MCNC: 3.9 GM/DL (ref 2.4–3.5)
GLUCOSE SERPL-MCNC: 131 MG/DL (ref 82–115)
HBA1C MFR BLD: 7.8 %
HDLC SERPL-MCNC: 58 MG/DL (ref 35–60)
LDLC SERPL CALC-MCNC: 94 MG/DL (ref 50–140)
POTASSIUM SERPL-SCNC: 4.4 MMOL/L (ref 3.5–5.1)
PROT SERPL-MCNC: 8 GM/DL (ref 5.8–7.6)
PTH-INTACT SERPL-MCNC: 54.3 PG/ML (ref 8.7–77)
SODIUM SERPL-SCNC: 137 MMOL/L (ref 136–145)
TRIGL SERPL-MCNC: 128 MG/DL (ref 34–140)
VLDLC SERPL CALC-MCNC: 26 MG/DL

## 2023-08-14 PROCEDURE — 80061 LIPID PANEL: CPT

## 2023-08-14 PROCEDURE — 80053 COMPREHEN METABOLIC PANEL: CPT

## 2023-08-14 PROCEDURE — 36415 COLL VENOUS BLD VENIPUNCTURE: CPT

## 2023-08-14 PROCEDURE — 83970 ASSAY OF PARATHORMONE: CPT

## 2023-08-14 PROCEDURE — 83036 HEMOGLOBIN GLYCOSYLATED A1C: CPT

## 2023-08-17 NOTE — PROGRESS NOTES
Please inform patient of results.    1. FT4 normal indicating subclinical hypothyroidism. Repeat TSH and FT4 in 6 months.     Thanks for all you do,    Rodrigue

## 2023-09-14 ENCOUNTER — LAB VISIT (OUTPATIENT)
Dept: LAB | Facility: HOSPITAL | Age: 78
End: 2023-09-14
Attending: INTERNAL MEDICINE
Payer: MEDICARE

## 2023-09-14 DIAGNOSIS — D47.2 MGUS (MONOCLONAL GAMMOPATHY OF UNKNOWN SIGNIFICANCE): ICD-10-CM

## 2023-09-14 LAB
ALBUMIN SERPL-MCNC: 3.9 G/DL (ref 3.4–4.8)
ALBUMIN/GLOB SERPL: 0.9 RATIO (ref 1.1–2)
ALP SERPL-CCNC: 94 UNIT/L (ref 40–150)
ALT SERPL-CCNC: 21 UNIT/L (ref 0–55)
AST SERPL-CCNC: 18 UNIT/L (ref 5–34)
BASOPHILS # BLD AUTO: 0.03 X10(3)/MCL
BASOPHILS NFR BLD AUTO: 0.4 %
BILIRUB SERPL-MCNC: 0.6 MG/DL
BUN SERPL-MCNC: 7.4 MG/DL (ref 8.4–25.7)
CALCIUM SERPL-MCNC: 10 MG/DL (ref 8.8–10)
CHLORIDE SERPL-SCNC: 105 MMOL/L (ref 98–107)
CO2 SERPL-SCNC: 28 MMOL/L (ref 23–31)
CREAT SERPL-MCNC: 1.14 MG/DL (ref 0.73–1.18)
EOSINOPHIL # BLD AUTO: 0.27 X10(3)/MCL (ref 0–0.9)
EOSINOPHIL NFR BLD AUTO: 3.7 %
ERYTHROCYTE [DISTWIDTH] IN BLOOD BY AUTOMATED COUNT: 13.9 % (ref 11.5–17)
GFR SERPLBLD CREATININE-BSD FMLA CKD-EPI: >60 MLS/MIN/1.73/M2
GLOBULIN SER-MCNC: 4.2 GM/DL (ref 2.4–3.5)
GLUCOSE SERPL-MCNC: 113 MG/DL (ref 82–115)
HCT VFR BLD AUTO: 44.6 % (ref 42–52)
HGB BLD-MCNC: 14 G/DL (ref 14–18)
IGA SERPL-MCNC: 202 MG/DL (ref 101–645)
IGG SERPL-MCNC: 1720 MG/DL (ref 540–1822)
IGM SERPL-MCNC: 376 MG/DL (ref 22–240)
IMM GRANULOCYTES # BLD AUTO: 0.01 X10(3)/MCL (ref 0–0.04)
IMM GRANULOCYTES NFR BLD AUTO: 0.1 %
LYMPHOCYTES # BLD AUTO: 3.91 X10(3)/MCL (ref 0.6–4.6)
LYMPHOCYTES NFR BLD AUTO: 53.6 %
MCH RBC QN AUTO: 29.7 PG (ref 27–31)
MCHC RBC AUTO-ENTMCNC: 31.4 G/DL (ref 33–36)
MCV RBC AUTO: 94.5 FL (ref 80–94)
MONOCYTES # BLD AUTO: 0.64 X10(3)/MCL (ref 0.1–1.3)
MONOCYTES NFR BLD AUTO: 8.8 %
NEUTROPHILS # BLD AUTO: 2.43 X10(3)/MCL (ref 2.1–9.2)
NEUTROPHILS NFR BLD AUTO: 33.4 %
PLATELET # BLD AUTO: 250 X10(3)/MCL (ref 130–400)
PMV BLD AUTO: 8.2 FL (ref 7.4–10.4)
POTASSIUM SERPL-SCNC: 4.1 MMOL/L (ref 3.5–5.1)
PROT SERPL-MCNC: 8.1 GM/DL (ref 5.8–7.6)
RBC # BLD AUTO: 4.72 X10(6)/MCL (ref 4.7–6.1)
SODIUM SERPL-SCNC: 138 MMOL/L (ref 136–145)
WBC # SPEC AUTO: 7.29 X10(3)/MCL (ref 4.5–11.5)

## 2023-09-14 PROCEDURE — 84165 PROTEIN E-PHORESIS SERUM: CPT

## 2023-09-14 PROCEDURE — 36415 COLL VENOUS BLD VENIPUNCTURE: CPT

## 2023-09-14 PROCEDURE — 85025 COMPLETE CBC W/AUTO DIFF WBC: CPT

## 2023-09-14 PROCEDURE — 80053 COMPREHEN METABOLIC PANEL: CPT

## 2023-09-14 PROCEDURE — 83521 IG LIGHT CHAINS FREE EACH: CPT

## 2023-09-14 PROCEDURE — 82784 ASSAY IGA/IGD/IGG/IGM EACH: CPT

## 2023-09-14 PROCEDURE — 86334 IMMUNOFIX E-PHORESIS SERUM: CPT

## 2023-09-15 LAB
ALBUMIN % SPEP (OHS): 46.43
ALBUMIN SERPL-MCNC: 3.7 G/DL (ref 3.4–4.8)
ALBUMIN/GLOB SERPL: 0.9 RATIO (ref 1.1–2)
ALPHA 1 GLOB (OHS): 0.25 GM/DL
ALPHA 1 GLOB% (OHS): 3.16
ALPHA 2 GLOB % (OHS): 9.51
ALPHA 2 GLOB (OHS): 0.75 GM/DL
BETA GLOB (OHS): 1.21 GM/DL
BETA GLOB% (OHS): 15.33
GAMMA GLOBULIN % (OHS): 25.57
GAMMA GLOBULIN (OHS): 2.02 GM/DL
GLOBULIN SER-MCNC: 4.2 GM/DL (ref 2.4–3.5)
KAPPA LC FREE SER-MCNC: 3.04 MG/DL (ref 0.33–1.94)
KAPPA LC FREE/LAMBDA FREE SER: 0.9 {RATIO} (ref 0.26–1.65)
LAMBDA LC FREE SERPL-MCNC: 3.38 MG/DL (ref 0.57–2.63)
M SPIKE % (OHS): ABNORMAL
M SPIKE (OHS): ABNORMAL
PATH REV: NORMAL
PROT SERPL-MCNC: 7.9 GM/DL (ref 5.8–7.6)

## 2023-09-18 ENCOUNTER — OFFICE VISIT (OUTPATIENT)
Dept: INTERNAL MEDICINE | Facility: CLINIC | Age: 78
End: 2023-09-18
Payer: MEDICARE

## 2023-09-18 VITALS
HEIGHT: 69 IN | BODY MASS INDEX: 35.4 KG/M2 | WEIGHT: 239 LBS | OXYGEN SATURATION: 98 % | HEART RATE: 90 BPM | SYSTOLIC BLOOD PRESSURE: 148 MMHG | DIASTOLIC BLOOD PRESSURE: 6 MMHG | RESPIRATION RATE: 16 BRPM | TEMPERATURE: 98 F

## 2023-09-18 DIAGNOSIS — Z23 NEED FOR VACCINATION: Primary | ICD-10-CM

## 2023-09-18 DIAGNOSIS — E11.42 TYPE 2 DIABETES MELLITUS WITH DIABETIC POLYNEUROPATHY, WITHOUT LONG-TERM CURRENT USE OF INSULIN: ICD-10-CM

## 2023-09-18 PROCEDURE — 99214 PR OFFICE/OUTPT VISIT, EST, LEVL IV, 30-39 MIN: ICD-10-PCS | Mod: ,,, | Performed by: INTERNAL MEDICINE

## 2023-09-18 PROCEDURE — 90694 FLU VACCINE - QUADRIVALENT - ADJUVANTED: ICD-10-PCS | Mod: ,,, | Performed by: INTERNAL MEDICINE

## 2023-09-18 PROCEDURE — G0008 ADMIN INFLUENZA VIRUS VAC: HCPCS | Mod: ,,, | Performed by: INTERNAL MEDICINE

## 2023-09-18 PROCEDURE — 3051F HG A1C>EQUAL 7.0%<8.0%: CPT | Mod: CPTII,,, | Performed by: INTERNAL MEDICINE

## 2023-09-18 PROCEDURE — 1159F PR MEDICATION LIST DOCUMENTED IN MEDICAL RECORD: ICD-10-PCS | Mod: CPTII,,, | Performed by: INTERNAL MEDICINE

## 2023-09-18 PROCEDURE — 99214 OFFICE O/P EST MOD 30 MIN: CPT | Mod: ,,, | Performed by: INTERNAL MEDICINE

## 2023-09-18 PROCEDURE — 90694 VACC AIIV4 NO PRSRV 0.5ML IM: CPT | Mod: ,,, | Performed by: INTERNAL MEDICINE

## 2023-09-18 PROCEDURE — 1160F RVW MEDS BY RX/DR IN RCRD: CPT | Mod: CPTII,,, | Performed by: INTERNAL MEDICINE

## 2023-09-18 PROCEDURE — 3051F PR MOST RECENT HEMOGLOBIN A1C LEVEL 7.0 - < 8.0%: ICD-10-PCS | Mod: CPTII,,, | Performed by: INTERNAL MEDICINE

## 2023-09-18 PROCEDURE — 1160F PR REVIEW ALL MEDS BY PRESCRIBER/CLIN PHARMACIST DOCUMENTED: ICD-10-PCS | Mod: CPTII,,, | Performed by: INTERNAL MEDICINE

## 2023-09-18 PROCEDURE — 1159F MED LIST DOCD IN RCRD: CPT | Mod: CPTII,,, | Performed by: INTERNAL MEDICINE

## 2023-09-18 PROCEDURE — G0008 FLU VACCINE - QUADRIVALENT - ADJUVANTED: ICD-10-PCS | Mod: ,,, | Performed by: INTERNAL MEDICINE

## 2023-09-18 RX ORDER — TIRZEPATIDE 5 MG/.5ML
5 INJECTION, SOLUTION SUBCUTANEOUS
Qty: 4 PEN | Refills: 5 | Status: SHIPPED | OUTPATIENT
Start: 2023-09-18 | End: 2024-01-18 | Stop reason: SDUPTHER

## 2023-09-18 NOTE — PROGRESS NOTES
Subjective:      Patient ID: Galdino Joshi is a 77 y.o. male.    Chief Complaint: No chief complaint on file.      HPI:  77 y.o. male for diabetic revisit; fasting glucose at 113. on Mounjaro 2.5 he has not gotten his 5 mg dose yet he was due for a shot on Friday.  He reports no side effects from the medication he also recently had some lab studies done with his hematologist however he has not gotten those lab results back yet.  No acute complaints today  He has never had a colonoscopy and does not want a colonoscopy right now.   Dr. Sharp- urology for BPH, PSA. Follows [facility] every 6 months  Dr. Adorno- cardiologist; hasn't been in a couple years  Dr. Dawkins- IGG monoclonal gammopathy  Dr. Wen opt goes next week   UTD with pneumonia vaccines, flu vaccine      Past Medical History:  Past Medical History:   Diagnosis Date    BPH (benign prostatic hyperplasia)     Essential (primary) hypertension     Mixed hyperlipidemia     Proteinuria     Type 2 diabetes mellitus without complication, without long-term current use of insulin      Past Surgical History:   Procedure Laterality Date    CARPAL TUNNEL RELEASE N/A     CARPAL TUNNEL RELEASE N/A     neuroplasty median nerve at carpal tunnel  Right     RELEASE OF ULNAR NERVE AT CUBITAL TUNNEL Right     SPINAL FUSION       Review of patient's allergies indicates:  No Known Allergies  Current Outpatient Medications on File Prior to Visit   Medication Sig Dispense Refill    amLODIPine (NORVASC) 10 MG tablet Take 1 tablet (10 mg total) by mouth once daily. 90 tablet 3    atorvastatin (LIPITOR) 80 MG tablet Take 1 tablet (80 mg total) by mouth every evening. 90 tablet 3    cholecalciferol, vitamin D3, 1,250 mcg (50,000 unit) capsule Take 1 capsule (50,000 Units total) by mouth once a week. 8 capsule 0    colchicine (COLCRYS) 0.6 mg tablet 1.2 mg p.o. x1 then 0.6 mg p.o. 1 hour later x1 3 tablet 4    ergocalciferol (ERGOCALCIFEROL) 50,000 unit Cap TAKE 1 CAPSULE EVERY WEEK  13 capsule 4    finasteride (PROSCAR) 5 mg tablet TAKE 1 TABLET EVERY DAY 90 tablet 4    metFORMIN (GLUCOPHAGE) 1000 MG tablet TAKE 1 TABLET TWICE DAILY 180 tablet 4    metoprolol succinate (TOPROL-XL) 100 MG 24 hr tablet Take 1 tablet (100 mg total) by mouth once daily. 90 tablet 3    olmesartan-hydrochlorothiazide (BENICAR HCT) 40-25 mg per tablet TAKE 1 TABLET EVERY DAY 90 tablet 4    [DISCONTINUED] tirzepatide (MOUNJARO) 5 mg/0.5 mL PnIj Inject 5 mg into the skin every 7 days. 4 pen 5    gabapentin (NEURONTIN) 300 MG capsule Take 1 capsule (300 mg total) by mouth 3 (three) times daily as needed (neuropathic pain). 90 capsule 0     No current facility-administered medications on file prior to visit.     Social History     Socioeconomic History    Marital status:    Tobacco Use    Smoking status: Never    Smokeless tobacco: Current     Types: Chew   Substance and Sexual Activity    Alcohol use: Yes     Alcohol/week: 35.0 standard drinks of alcohol     Types: 35 Cans of beer per week    Drug use: Never    Sexual activity: Not Currently   Social History Narrative    Pt lives with wife in home and has help from daughter     Social Determinants of Health     Financial Resource Strain: Low Risk  (5/6/2022)    Overall Financial Resource Strain (CARDIA)     Difficulty of Paying Living Expenses: Not hard at all   Food Insecurity: No Food Insecurity (5/6/2022)    Hunger Vital Sign     Worried About Running Out of Food in the Last Year: Never true     Ran Out of Food in the Last Year: Never true   Transportation Needs: No Transportation Needs (5/6/2022)    PRAPARE - Transportation     Lack of Transportation (Medical): No     Lack of Transportation (Non-Medical): No   Physical Activity: Sufficiently Active (5/6/2022)    Exercise Vital Sign     Days of Exercise per Week: 7 days     Minutes of Exercise per Session: 60 min   Social Connections: Socially Integrated (5/6/2022)    Social Connection and Isolation Panel  [NHANES]     Frequency of Communication with Friends and Family: More than three times a week     Frequency of Social Gatherings with Friends and Family: Once a week     Attends Christianity Services: More than 4 times per year     Active Member of Clubs or Organizations: No     Attends Club or Organization Meetings: 1 to 4 times per year     Marital Status:    Housing Stability: Unknown (5/6/2022)    Housing Stability Vital Sign     Unable to Pay for Housing in the Last Year: No     Unstable Housing in the Last Year: No     Family History   Problem Relation Age of Onset    Heart disease Mother        Review of Systems  A comprehensive review of systems was performed and was negative with exception of what is documented above.     Objective:   There were no vitals taken for this visit.  Physical Exam  General : Alert and oriented, No acute distress, afebrile.  Eye : PERRLA. EOMI. Normal conjunctiva, Sclerae are nonicteric.   Integumentary : Warm, moist, intact.  Neurologic : Alert, Oriented  Psychiatric : Cooperative, Appropriate mood & affect.   Assessment/ Plan:   1. Type 2 diabetes mellitus with diabetic polyneuropathy, without long-term current use of insulin  Assessment & Plan:  Increase Mounjaro dose to 5 mg, RTC 3 months for revisit      Other orders  -     tirzepatide (MOUNJARO) 5 mg/0.5 mL PnIj; Inject 5 mg into the skin every 7 days.  Dispense: 4 pen ; Refill: 5             Follow up in about 3 months (around 12/18/2023) for DIABETIC REVISIT, with labs prior to visit.

## 2023-09-19 ENCOUNTER — TELEPHONE (OUTPATIENT)
Dept: HEMATOLOGY/ONCOLOGY | Facility: CLINIC | Age: 78
End: 2023-09-19
Payer: MEDICARE

## 2023-09-19 DIAGNOSIS — D47.2 MGUS (MONOCLONAL GAMMOPATHY OF UNKNOWN SIGNIFICANCE): Primary | ICD-10-CM

## 2023-09-19 NOTE — PROGRESS NOTES
Patient states he hasn't heard from the office in regards to his lab results. Just wanted to give you a heads up.  thanks

## 2023-09-19 NOTE — TELEPHONE ENCOUNTER
----- Message from TREY Galaviz sent at 9/19/2023  9:41 AM CDT -----  Hey I'm not sure why he had MM labs in 9/2023. I saw him in 6/2023 and ordered a 6 month f/u with MM labs 1 week prior ( or at least that what I intended to do) He had MM labs last week (but with no f/u scheduled). Everything is stable. No intervention needed. Will you let him know? Also, he will need repeat MM labs in 3 months before his return appt.

## 2023-09-26 LAB
LEFT EYE DM RETINOPATHY: POSITIVE
RIGHT EYE DM RETINOPATHY: POSITIVE

## 2023-10-04 ENCOUNTER — DOCUMENTATION ONLY (OUTPATIENT)
Dept: INTERNAL MEDICINE | Facility: CLINIC | Age: 78
End: 2023-10-04
Payer: MEDICARE

## 2023-10-06 ENCOUNTER — HOSPITAL ENCOUNTER (EMERGENCY)
Facility: HOSPITAL | Age: 78
Discharge: HOME | End: 2023-10-06
Attending: STUDENT IN AN ORGANIZED HEALTH CARE EDUCATION/TRAINING PROGRAM
Payer: MEDICARE

## 2023-10-06 VITALS
RESPIRATION RATE: 18 BRPM | DIASTOLIC BLOOD PRESSURE: 85 MMHG | OXYGEN SATURATION: 97 % | HEART RATE: 84 BPM | SYSTOLIC BLOOD PRESSURE: 179 MMHG | BODY MASS INDEX: 35.74 KG/M2 | TEMPERATURE: 99 F | WEIGHT: 242 LBS

## 2023-10-06 DIAGNOSIS — J06.9 VIRAL URI WITH COUGH: Primary | ICD-10-CM

## 2023-10-06 LAB
FLUAV AG UPPER RESP QL IA.RAPID: NOT DETECTED
FLUBV AG UPPER RESP QL IA.RAPID: NOT DETECTED
SARS-COV-2 RNA RESP QL NAA+PROBE: NOT DETECTED

## 2023-10-06 PROCEDURE — 0240U COVID/FLU A&B PCR: CPT | Performed by: NURSE PRACTITIONER

## 2023-10-06 PROCEDURE — 99282 EMERGENCY DEPT VISIT SF MDM: CPT

## 2023-10-06 NOTE — ED PROVIDER NOTES
Encounter Date: 10/6/2023       History     Chief Complaint   Patient presents with    Cough     Cough and nasal congestion after getting flu shot last week.      77-year-old male presents to ER complaining of nasal congestion and cough since getting his flu vaccine last week.  He denies any fever, nausea, vomiting or diarrhea.  He reports no shortness of breath or chest pain.  He is had no purulent mucus production.    The history is provided by the patient. No  was used.     Review of patient's allergies indicates:  No Known Allergies  Past Medical History:   Diagnosis Date    BPH (benign prostatic hyperplasia)     Essential (primary) hypertension     Mixed hyperlipidemia     Proteinuria     Type 2 diabetes mellitus without complication, without long-term current use of insulin      Past Surgical History:   Procedure Laterality Date    CARPAL TUNNEL RELEASE N/A     CARPAL TUNNEL RELEASE N/A     neuroplasty median nerve at carpal tunnel  Right     RELEASE OF ULNAR NERVE AT CUBITAL TUNNEL Right     SPINAL FUSION       Family History   Problem Relation Age of Onset    Heart disease Mother      Social History     Tobacco Use    Smoking status: Never    Smokeless tobacco: Current     Types: Chew   Substance Use Topics    Alcohol use: Yes     Alcohol/week: 35.0 standard drinks of alcohol     Types: 35 Cans of beer per week    Drug use: Never     Review of Systems   Constitutional:  Negative for fever.   HENT:  Positive for congestion.    Respiratory:  Positive for cough. Negative for shortness of breath.    Cardiovascular:  Negative for palpitations.   Gastrointestinal:  Negative for diarrhea and vomiting.   All other systems reviewed and are negative.      Physical Exam     Initial Vitals [10/06/23 1427]   BP Pulse Resp Temp SpO2   (!) 179/85 84 18 98.5 °F (36.9 °C) 97 %      MAP       --         Physical Exam    Constitutional: He appears well-developed and well-nourished.   HENT:   Head:  Normocephalic.   Eyes: EOM are normal.   Neck: Neck supple.   Cardiovascular:  Normal rate, regular rhythm and normal heart sounds.           Pulmonary/Chest: Breath sounds normal. No respiratory distress.   Abdominal: He exhibits no distension.   Musculoskeletal:         General: Normal range of motion.      Cervical back: Neck supple.     Neurological: He is alert and oriented to person, place, and time.   Skin: Skin is warm and dry. Capillary refill takes less than 2 seconds.   Psychiatric: He has a normal mood and affect.         ED Course   Procedures  Labs Reviewed   COVID/FLU A&B PCR - Normal    Narrative:     The Xpert Xpress SARS-CoV-2/FLU/RSV plus is a rapid, multiplexed real-time PCR test intended for the simultaneous qualitative detection and differentiation of SARS-CoV-2, Influenza A, Influenza B, and respiratory syncytial virus (RSV) viral RNA in either nasopharyngeal swab or nasal swab specimens.                Imaging Results    None          Medications - No data to display  Medical Decision Making  DDX:  COVID, influenza, viral URI    77-year-old male with nasal congestion and dry cough for 1 week.  He is afebrile with no respiratory distress.  Lungs are clear to auscultation bilaterally.  Oxygen saturation 98% on room air.  Will discharge home with cough medication and have him follow-up with his primary care as needed.  COVID and influenza are negative.    Amount and/or Complexity of Data Reviewed  Labs:  Decision-making details documented in ED Course.               ED Course as of 10/06/23 1537   Fri Oct 06, 2023   1530 Influenza A, Molecular: Not Detected [LN]   1530 Influenza B, Molecular: Not Detected [LN]   1530 SARS-CoV2 (COVID-19) Qualitative PCR: Not Detected [LN]      ED Course User Index  [LN] Radha Phillips FNP                    Clinical Impression:   Final diagnoses:  [J06.9] Viral URI with cough (Primary)        ED Disposition Condition    Discharge Stable          ED  Prescriptions       Medication Sig Dispense Start Date End Date Auth. Provider    pyrilamine-chlophedianoL 12.5-12.5 mg/5 mL Liqd Take 10 mLs by mouth every 8 (eight) hours as needed (cough). 180 mL 10/6/2023 -- Radha Phillips FNP          Follow-up Information    None          Radha hPillips FNP  10/06/23 1531

## 2023-11-10 ENCOUNTER — LAB VISIT (OUTPATIENT)
Dept: LAB | Facility: HOSPITAL | Age: 78
End: 2023-11-10
Attending: NURSE PRACTITIONER
Payer: MEDICARE

## 2023-11-10 DIAGNOSIS — D47.2 MGUS (MONOCLONAL GAMMOPATHY OF UNKNOWN SIGNIFICANCE): ICD-10-CM

## 2023-11-10 LAB
ALBUMIN SERPL-MCNC: 3.9 G/DL (ref 3.4–4.8)
ALBUMIN/GLOB SERPL: 1 RATIO (ref 1.1–2)
ALP SERPL-CCNC: 104 UNIT/L (ref 40–150)
ALT SERPL-CCNC: 18 UNIT/L (ref 0–55)
AST SERPL-CCNC: 19 UNIT/L (ref 5–34)
BASOPHILS # BLD AUTO: 0.01 X10(3)/MCL
BASOPHILS NFR BLD AUTO: 0.1 %
BILIRUB SERPL-MCNC: 0.6 MG/DL
BUN SERPL-MCNC: 10.6 MG/DL (ref 8.4–25.7)
CALCIUM SERPL-MCNC: 10.2 MG/DL (ref 8.8–10)
CHLORIDE SERPL-SCNC: 105 MMOL/L (ref 98–107)
CO2 SERPL-SCNC: 25 MMOL/L (ref 23–31)
CREAT SERPL-MCNC: 1.01 MG/DL (ref 0.73–1.18)
EOSINOPHIL # BLD AUTO: 0.09 X10(3)/MCL (ref 0–0.9)
EOSINOPHIL NFR BLD AUTO: 1.2 %
ERYTHROCYTE [DISTWIDTH] IN BLOOD BY AUTOMATED COUNT: 13.6 % (ref 11.5–17)
GFR SERPLBLD CREATININE-BSD FMLA CKD-EPI: >60 MLS/MIN/1.73/M2
GLOBULIN SER-MCNC: 4.1 GM/DL (ref 2.4–3.5)
GLUCOSE SERPL-MCNC: 96 MG/DL (ref 82–115)
HCT VFR BLD AUTO: 47 % (ref 42–52)
HGB BLD-MCNC: 14.7 G/DL (ref 14–18)
IGA SERPL-MCNC: 203 MG/DL (ref 101–645)
IGG SERPL-MCNC: 1717 MG/DL (ref 540–1822)
IGM SERPL-MCNC: 371 MG/DL (ref 22–240)
IMM GRANULOCYTES # BLD AUTO: 0.01 X10(3)/MCL (ref 0–0.04)
IMM GRANULOCYTES NFR BLD AUTO: 0.1 %
LYMPHOCYTES # BLD AUTO: 2.82 X10(3)/MCL (ref 0.6–4.6)
LYMPHOCYTES NFR BLD AUTO: 37 %
MCH RBC QN AUTO: 29.7 PG (ref 27–31)
MCHC RBC AUTO-ENTMCNC: 31.3 G/DL (ref 33–36)
MCV RBC AUTO: 94.9 FL (ref 80–94)
MONOCYTES # BLD AUTO: 0.45 X10(3)/MCL (ref 0.1–1.3)
MONOCYTES NFR BLD AUTO: 5.9 %
NEUTROPHILS # BLD AUTO: 4.25 X10(3)/MCL (ref 2.1–9.2)
NEUTROPHILS NFR BLD AUTO: 55.7 %
PLATELET # BLD AUTO: 262 X10(3)/MCL (ref 130–400)
PMV BLD AUTO: 8.2 FL (ref 7.4–10.4)
POTASSIUM SERPL-SCNC: 3.6 MMOL/L (ref 3.5–5.1)
PROT SERPL-MCNC: 8 GM/DL (ref 5.8–7.6)
RBC # BLD AUTO: 4.95 X10(6)/MCL (ref 4.7–6.1)
SODIUM SERPL-SCNC: 139 MMOL/L (ref 136–145)
WBC # SPEC AUTO: 7.63 X10(3)/MCL (ref 4.5–11.5)

## 2023-11-10 PROCEDURE — 84165 PROTEIN E-PHORESIS SERUM: CPT

## 2023-11-10 PROCEDURE — 85025 COMPLETE CBC W/AUTO DIFF WBC: CPT

## 2023-11-10 PROCEDURE — 83521 IG LIGHT CHAINS FREE EACH: CPT

## 2023-11-10 PROCEDURE — 82784 ASSAY IGA/IGD/IGG/IGM EACH: CPT

## 2023-11-10 PROCEDURE — 36415 COLL VENOUS BLD VENIPUNCTURE: CPT

## 2023-11-10 PROCEDURE — 86334 IMMUNOFIX E-PHORESIS SERUM: CPT

## 2023-11-10 PROCEDURE — 80053 COMPREHEN METABOLIC PANEL: CPT

## 2023-11-13 ENCOUNTER — OFFICE VISIT (OUTPATIENT)
Dept: HEMATOLOGY/ONCOLOGY | Facility: CLINIC | Age: 78
End: 2023-11-13
Payer: MEDICARE

## 2023-11-13 VITALS
WEIGHT: 232 LBS | BODY MASS INDEX: 34.36 KG/M2 | OXYGEN SATURATION: 97 % | HEIGHT: 69 IN | HEART RATE: 80 BPM | RESPIRATION RATE: 17 BRPM | TEMPERATURE: 98 F | DIASTOLIC BLOOD PRESSURE: 73 MMHG | SYSTOLIC BLOOD PRESSURE: 162 MMHG

## 2023-11-13 DIAGNOSIS — D47.2 MONOCLONAL GAMMOPATHY OF UNKNOWN SIGNIFICANCE (MGUS): Primary | ICD-10-CM

## 2023-11-13 PROBLEM — Z00.00 WELL ADULT EXAM: Status: RESOLVED | Noted: 2023-08-10 | Resolved: 2023-11-13

## 2023-11-13 LAB
ALBUMIN % SPEP (OHS): 44.33
ALBUMIN SERPL-MCNC: 3.6 G/DL (ref 3.4–4.8)
ALBUMIN/GLOB SERPL: 0.8 RATIO (ref 1.1–2)
ALPHA 1 GLOB (OHS): 0.28 GM/DL
ALPHA 1 GLOB% (OHS): 3.46
ALPHA 2 GLOB % (OHS): 10.12
ALPHA 2 GLOB (OHS): 0.81 GM/DL
BETA GLOB (OHS): 1.25 GM/DL
BETA GLOB% (OHS): 15.65
GAMMA GLOBULIN % (OHS): 26.43
GAMMA GLOBULIN (OHS): 2.11 GM/DL
GLOBULIN SER-MCNC: 4.4 GM/DL (ref 2.4–3.5)
KAPPA LC FREE SER-MCNC: 2.82 MG/DL (ref 0.33–1.94)
KAPPA LC FREE/LAMBDA FREE SER: 0.87 {RATIO} (ref 0.26–1.65)
LAMBDA LC FREE SERPL-MCNC: 3.24 MG/DL (ref 0.57–2.63)
M SPIKE % (OHS): ABNORMAL
M SPIKE (OHS): ABNORMAL
PATH REV: NORMAL
PROT SERPL-MCNC: 8 GM/DL (ref 5.8–7.6)

## 2023-11-13 PROCEDURE — 1126F AMNT PAIN NOTED NONE PRSNT: CPT | Mod: CPTII,S$GLB,, | Performed by: INTERNAL MEDICINE

## 2023-11-13 PROCEDURE — 1126F PR PAIN SEVERITY QUANTIFIED, NO PAIN PRESENT: ICD-10-PCS | Mod: CPTII,S$GLB,, | Performed by: INTERNAL MEDICINE

## 2023-11-13 PROCEDURE — 3078F DIAST BP <80 MM HG: CPT | Mod: CPTII,S$GLB,, | Performed by: INTERNAL MEDICINE

## 2023-11-13 PROCEDURE — 3077F PR MOST RECENT SYSTOLIC BLOOD PRESSURE >= 140 MM HG: ICD-10-PCS | Mod: CPTII,S$GLB,, | Performed by: INTERNAL MEDICINE

## 2023-11-13 PROCEDURE — 3288F FALL RISK ASSESSMENT DOCD: CPT | Mod: CPTII,S$GLB,, | Performed by: INTERNAL MEDICINE

## 2023-11-13 PROCEDURE — 99214 OFFICE O/P EST MOD 30 MIN: CPT | Mod: S$GLB,,, | Performed by: INTERNAL MEDICINE

## 2023-11-13 PROCEDURE — 3078F PR MOST RECENT DIASTOLIC BLOOD PRESSURE < 80 MM HG: ICD-10-PCS | Mod: CPTII,S$GLB,, | Performed by: INTERNAL MEDICINE

## 2023-11-13 PROCEDURE — 99999 PR PBB SHADOW E&M-EST. PATIENT-LVL III: CPT | Mod: PBBFAC,,, | Performed by: INTERNAL MEDICINE

## 2023-11-13 PROCEDURE — 99999 PR PBB SHADOW E&M-EST. PATIENT-LVL III: ICD-10-PCS | Mod: PBBFAC,,, | Performed by: INTERNAL MEDICINE

## 2023-11-13 PROCEDURE — 1101F PT FALLS ASSESS-DOCD LE1/YR: CPT | Mod: CPTII,S$GLB,, | Performed by: INTERNAL MEDICINE

## 2023-11-13 PROCEDURE — 3288F PR FALLS RISK ASSESSMENT DOCUMENTED: ICD-10-PCS | Mod: CPTII,S$GLB,, | Performed by: INTERNAL MEDICINE

## 2023-11-13 PROCEDURE — 99214 PR OFFICE/OUTPT VISIT, EST, LEVL IV, 30-39 MIN: ICD-10-PCS | Mod: S$GLB,,, | Performed by: INTERNAL MEDICINE

## 2023-11-13 PROCEDURE — 1101F PR PT FALLS ASSESS DOC 0-1 FALLS W/OUT INJ PAST YR: ICD-10-PCS | Mod: CPTII,S$GLB,, | Performed by: INTERNAL MEDICINE

## 2023-11-13 PROCEDURE — 3077F SYST BP >= 140 MM HG: CPT | Mod: CPTII,S$GLB,, | Performed by: INTERNAL MEDICINE

## 2023-11-13 NOTE — PROGRESS NOTES
HEMATOLOGY/ONCOLOGY OFFICE CLINIC VISIT    Visit Information:    Initial Evaluation: 8/30/2018, 11/15/2022  Referring Provider: Dr Thacker  Other providers:Dr Sharp  Code status:Not addressed    Diagnosis:  MGUS    Present treatment:    Treatment/Oncology history:    Plan of care: MM w/u    Imaging:  Skeletal Survey 8/31/2018: No suspicious osseous lesions are identified radiographically.      Pathology:      CLINICAL HISTORY:       Patient: Galdino Joshi is a 78 y.o. male  kindly referred by Dr. Thacker for evaluation of gammopathy. I saw him back on 8/30/2018 but lost followup. He did not return to the clinic. At the time, patient had routine blood work revealed increase in protein levels and further studies showed M-spikes in the gamma region. The monoclonal protein peaks collectively account for 1.68 g/dL of the total 1.92 g/dL of protein in the gamma region. JAILYN pattern shows IgG type lambda and IgM type kappa biclonal proteins.    Repeated labs 8/30/2018:  Immunofixation shows IgM monoclonal protein with kappa light chain specificity.  Immunofixation shows IgG monoclonal protein with lambda light chain specificity.  Two spikes observed.  0.3, 0.9    IgM   434  IgG   1571  IgA    146    Kappa 28.6  Lambda 38.6  K/L ratio 0.74    Patient also with elevated PSA for which has been follow since 2015 when he started with synmptoms of enlarge prostate.     Other than that he is doing well and does not have any complaints at all. He is here with his wife.   He denies any fever, chills, sweats. No chest pain or shortness of breath. Occ heaturia. No blood clots. No family history of blood disorders.      Chief Complaint: OTHER (No concerns today.)        Interval History:  Patient presents today for follow up for MGUS. MM labs drawn on 11/10/23, not all results have finalized. He is doing well and  has no complaints today. He is planning a trip to [place] for Thanksgiving to visit grand children.        Past Medical  History:   Diagnosis Date    BPH (benign prostatic hyperplasia)     Essential (primary) hypertension     Mixed hyperlipidemia     Proteinuria     Type 2 diabetes mellitus without complication, without long-term current use of insulin       Past Surgical History:   Procedure Laterality Date    CARPAL TUNNEL RELEASE N/A     CARPAL TUNNEL RELEASE N/A     neuroplasty median nerve at carpal tunnel  Right     RELEASE OF ULNAR NERVE AT CUBITAL TUNNEL Right     SPINAL FUSION       Family History   Problem Relation Age of Onset    Heart disease Mother      Social Connections: Socially Integrated (5/6/2022)    Social Connection and Isolation Panel [NHANES]     Frequency of Communication with Friends and Family: More than three times a week     Frequency of Social Gatherings with Friends and Family: Once a week     Attends Temple Services: More than 4 times per year     Active Member of Clubs or Organizations: No     Attends Club or Organization Meetings: 1 to 4 times per year     Marital Status:        Review of patient's allergies indicates:  No Known Allergies   Current Outpatient Medications on File Prior to Visit   Medication Sig Dispense Refill    amLODIPine (NORVASC) 10 MG tablet Take 1 tablet (10 mg total) by mouth once daily. 90 tablet 3    atorvastatin (LIPITOR) 80 MG tablet Take 1 tablet (80 mg total) by mouth every evening. 90 tablet 3    cholecalciferol, vitamin D3, 1,250 mcg (50,000 unit) capsule Take 1 capsule (50,000 Units total) by mouth once a week. 8 capsule 0    colchicine (COLCRYS) 0.6 mg tablet 1.2 mg p.o. x1 then 0.6 mg p.o. 1 hour later x1 3 tablet 4    ergocalciferol (ERGOCALCIFEROL) 50,000 unit Cap TAKE 1 CAPSULE EVERY WEEK 13 capsule 4    finasteride (PROSCAR) 5 mg tablet TAKE 1 TABLET EVERY DAY 90 tablet 4    metFORMIN (GLUCOPHAGE) 1000 MG tablet TAKE 1 TABLET TWICE DAILY 180 tablet 4    metoprolol succinate (TOPROL-XL) 100 MG 24 hr tablet Take 1 tablet (100 mg total) by mouth once daily.  90 tablet 3    olmesartan-hydrochlorothiazide (BENICAR HCT) 40-25 mg per tablet TAKE 1 TABLET EVERY DAY 90 tablet 4    pyrilamine-chlophedianoL 12.5-12.5 mg/5 mL Liqd Take 10 mLs by mouth every 8 (eight) hours as needed (cough). 180 mL 0    tirzepatide (MOUNJARO) 5 mg/0.5 mL PnIj Inject 5 mg into the skin every 7 days. 4 pen 5    gabapentin (NEURONTIN) 300 MG capsule Take 1 capsule (300 mg total) by mouth 3 (three) times daily as needed (neuropathic pain). 90 capsule 0     No current facility-administered medications on file prior to visit.      Review of Systems   Constitutional:  Negative for activity change, appetite change, chills, diaphoresis, fatigue, fever and unexpected weight change.   HENT:  Negative for nasal congestion, mouth sores, nosebleeds, postnasal drip, sinus pressure/congestion, sore throat and trouble swallowing.    Eyes:  Negative for visual disturbance.   Respiratory:  Negative for cough and shortness of breath.    Cardiovascular:  Negative for chest pain and palpitations.   Gastrointestinal:  Negative for abdominal distention, abdominal pain, blood in stool, change in bowel habit, constipation, diarrhea, nausea and vomiting.   Endocrine: Negative.    Genitourinary:  Negative for bladder incontinence, decreased urine volume, difficulty urinating, dysuria, frequency, hematuria, scrotal swelling, testicular pain and urgency.   Musculoskeletal:  Negative for arthralgias, back pain, gait problem, joint swelling, leg pain, myalgias and neck pain.   Integumentary:  Negative for rash.   Neurological:  Negative for dizziness, tremors, seizures, syncope, speech difficulty, weakness, light-headedness, numbness, headaches and memory loss.   Hematological:  Negative for adenopathy. Does not bruise/bleed easily.   Psychiatric/Behavioral:  Negative for agitation, confusion, hallucinations, sleep disturbance and suicidal ideas. The patient is not nervous/anxious.               Vitals:    11/13/23 1021   BP:  "(!) 162/73   BP Location: Left arm   Patient Position: Sitting   Pulse: 80   Resp: 17   Temp: 98 °F (36.7 °C)   TempSrc: Oral   SpO2: 97%   Weight: 105.2 kg (232 lb)   Height: 5' 9" (1.753 m)        Physical Exam  Vitals and nursing note reviewed.   Constitutional:       General: He is not in acute distress.     Appearance: Normal appearance.   HENT:      Head: Normocephalic and atraumatic.      Mouth/Throat:      Mouth: Mucous membranes are moist.   Eyes:      General: No scleral icterus.     Extraocular Movements: Extraocular movements intact.      Conjunctiva/sclera: Conjunctivae normal.   Neck:      Vascular: No JVD.   Cardiovascular:      Rate and Rhythm: Normal rate and regular rhythm.      Heart sounds: No murmur heard.  Pulmonary:      Effort: Pulmonary effort is normal.      Breath sounds: Normal breath sounds. No wheezing or rhonchi.   Abdominal:      General: Bowel sounds are normal. There is no distension.      Palpations: Abdomen is soft.      Tenderness: There is no abdominal tenderness.   Musculoskeletal:         General: No swelling or deformity.      Cervical back: Neck supple.   Lymphadenopathy:      Head:      Right side of head: No submandibular adenopathy.      Left side of head: No submandibular adenopathy.      Cervical: No cervical adenopathy.      Upper Body:      Right upper body: No supraclavicular or axillary adenopathy.      Left upper body: No supraclavicular or axillary adenopathy.      Lower Body: No right inguinal adenopathy. No left inguinal adenopathy.   Skin:     General: Skin is warm.      Coloration: Skin is not jaundiced.      Findings: No rash.   Neurological:      General: No focal deficit present.      Mental Status: He is alert and oriented to person, place, and time.      Cranial Nerves: Cranial nerves 2-12 are intact.   Psychiatric:         Attention and Perception: Attention normal.         Behavior: Behavior is cooperative.       ECOG SCORE             Laboratory:  CBC " with Differential:  Lab Results   Component Value Date    WBC 7.63 11/10/2023    RBC 4.95 11/10/2023    HGB 14.7 11/10/2023    HCT 47.0 11/10/2023    MCV 94.9 (H) 11/10/2023    MCH 29.7 11/10/2023    MCHC 31.3 (L) 11/10/2023    RDW 13.6 11/10/2023     11/10/2023    MPV 8.2 11/10/2023        CMP:  Sodium Level   Date Value Ref Range Status   11/10/2023 139 136 - 145 mmol/L Final     Comment:     @ [facility]     Potassium Level   Date Value Ref Range Status   11/10/2023 3.6 3.5 - 5.1 mmol/L Final     Comment:     @ [facility]     Carbon Dioxide   Date Value Ref Range Status   11/10/2023 25 23 - 31 mmol/L Final     Comment:     @ [facility]     Blood Urea Nitrogen   Date Value Ref Range Status   11/10/2023 10.6 8.4 - 25.7 mg/dL Final     Comment:     @ [facility]     Creatinine   Date Value Ref Range Status   11/10/2023 1.01 0.73 - 1.18 mg/dL Final     Comment:     @ [facility]     Calcium Level Total   Date Value Ref Range Status   11/10/2023 10.2 (H) 8.8 - 10.0 mg/dL Final     Comment:     @ [facility]     Albumin Level   Date Value Ref Range Status   11/10/2023 3.9 3.4 - 4.8 g/dL Final     Comment:     @ [facility]   11/10/2023 3.6 3.4 - 4.8 g/dL Final     Bilirubin Total   Date Value Ref Range Status   11/10/2023 0.6 <=1.5 mg/dL Final     Comment:     @ [facility]     Alkaline Phosphatase   Date Value Ref Range Status   11/10/2023 104 40 - 150 unit/L Final     Comment:     @ [facility]     Aspartate Aminotransferase   Date Value Ref Range Status   11/10/2023 19 5 - 34 unit/L Final     Comment:     @ [facility]     Alanine Aminotransferase   Date Value Ref Range Status   11/10/2023 18 0 - 55 unit/L Final     Comment:     @ [facility]     Estimated GFR-Non    Date Value Ref Range Status   05/03/2022 >60 mls/min/1.73/m2 Final         Assessment:             Plan:       Again, Discussed with the patient today that Multiple myeloma is a type of bone marrow cancer that forms in a type of white blood cell called a plasma cell. Plasma cells help you fight  infections by making antibodies that recognize and attack germs but when they transformed into cancer cells then they are not efficient and don't work well. This cells can affect not only the bone marrow but the bones in general causing distinctive bone lesions called Lytic lesions. It also can affect the kidney. Discussed prognosis and treatment recomendations and indications.  Discussed Monoclonal gammopathy of undetermined significance (MGUS) and that is the most common of a spectrum of diseases called plasma cell dyscrasias. The term MGUS denotes the presence of a monoclonal immunoglobulin (Ig), or M-protein, in the serum or urine in persons without evidence of multiple myeloma (MM), Waldenström macroglobulinemia (WM), amyloidosis (AL) or other lymphoproliferative disorders. The risk of progression to MM or other lymphoproliferative disorder is present at a constant rate throughout the remainder of a patient's life. This risk has been quantified at 1% per year.  I also discussed with the patient that elevated globulin can be associated with autoimmune disorders or recent infections/inflammation.  This is a disorder of the immune system so anything that affect immune system can increase the globulin level.     RTC in 6 months with NP, MM labs -  no urine  Labs will be done @ [facility] 1 week prior  Bone marrow biopsy will be defer for now but may need in the future.    The patient was given ample opportunity to ask questions and they were all answered to satisfaction; patient demonstrated understanding of what we discussed and is agreeable to the plan.      Preeti Dawkins MD  Hematology/Oncology      Professional Services   I, Madhuri Chandler LPN, acted solely as a scribe for and in the presence of Dr. Preeti Dawkins, who performed these services.

## 2023-12-04 ENCOUNTER — TELEPHONE (OUTPATIENT)
Dept: INTERNAL MEDICINE | Facility: CLINIC | Age: 78
End: 2023-12-04
Payer: MEDICARE

## 2023-12-04 DIAGNOSIS — E11.42 TYPE 2 DIABETES MELLITUS WITH DIABETIC POLYNEUROPATHY, WITHOUT LONG-TERM CURRENT USE OF INSULIN: Primary | ICD-10-CM

## 2023-12-04 DIAGNOSIS — E11.59 HYPERTENSION ASSOCIATED WITH DIABETES: ICD-10-CM

## 2023-12-04 DIAGNOSIS — I15.2 HYPERTENSION ASSOCIATED WITH DIABETES: ICD-10-CM

## 2023-12-04 DIAGNOSIS — E78.2 MIXED HYPERLIPIDEMIA: ICD-10-CM

## 2023-12-04 NOTE — TELEPHONE ENCOUNTER
----- Message from Tabatha Hernandez MA sent at 12/4/2023  8:22 AM CST -----  Regarding:  12/18@2:00pm  1. Are there any outstanding tasks in the patient's chart? Yes, fasting labs    2. Is there any documentation in the chart? No    3.Has patient been seen in an ER, Urgent care clinic, or been admitted since last visit?  If yes, When, where, and why    4. Has patient seen any other healthcare providers since last visit?  If yes, when, where, and why    5. Has patient had any bloodwork or XR done since last visit?    6. Is patient signed up for patient portal?

## 2023-12-12 ENCOUNTER — LAB VISIT (OUTPATIENT)
Dept: LAB | Facility: HOSPITAL | Age: 78
End: 2023-12-12
Attending: INTERNAL MEDICINE
Payer: MEDICARE

## 2023-12-12 DIAGNOSIS — E11.42 TYPE 2 DIABETES MELLITUS WITH DIABETIC POLYNEUROPATHY, WITHOUT LONG-TERM CURRENT USE OF INSULIN: ICD-10-CM

## 2023-12-12 DIAGNOSIS — I15.2 HYPERTENSION ASSOCIATED WITH DIABETES: ICD-10-CM

## 2023-12-12 DIAGNOSIS — E11.59 HYPERTENSION ASSOCIATED WITH DIABETES: ICD-10-CM

## 2023-12-12 DIAGNOSIS — E78.2 MIXED HYPERLIPIDEMIA: ICD-10-CM

## 2023-12-12 LAB
APPEARANCE UR: CLEAR
BACTERIA #/AREA URNS AUTO: NORMAL /HPF
BASOPHILS # BLD AUTO: 0.01 X10(3)/MCL
BASOPHILS NFR BLD AUTO: 0.1 %
BILIRUB UR QL STRIP.AUTO: NEGATIVE
CHOLEST SERPL-MCNC: 181 MG/DL
CHOLEST/HDLC SERPL: 3 {RATIO} (ref 0–5)
COLOR UR AUTO: YELLOW
CREAT UR-MCNC: 75.9 MG/DL (ref 63–166)
EOSINOPHIL # BLD AUTO: 0.2 X10(3)/MCL (ref 0–0.9)
EOSINOPHIL NFR BLD AUTO: 2.8 %
ERYTHROCYTE [DISTWIDTH] IN BLOOD BY AUTOMATED COUNT: 13.5 % (ref 11.5–17)
EST. AVERAGE GLUCOSE BLD GHB EST-MCNC: 128.4 MG/DL
GLUCOSE UR QL STRIP.AUTO: NEGATIVE
HBA1C MFR BLD: 6.1 %
HCT VFR BLD AUTO: 46.5 % (ref 42–52)
HDLC SERPL-MCNC: 65 MG/DL (ref 35–60)
HGB BLD-MCNC: 14.7 G/DL (ref 14–18)
IMM GRANULOCYTES # BLD AUTO: 0.01 X10(3)/MCL (ref 0–0.04)
IMM GRANULOCYTES NFR BLD AUTO: 0.1 %
KETONES UR QL STRIP.AUTO: NEGATIVE
LDLC SERPL CALC-MCNC: 102 MG/DL (ref 50–140)
LEUKOCYTE ESTERASE UR QL STRIP.AUTO: NEGATIVE
LYMPHOCYTES # BLD AUTO: 3.28 X10(3)/MCL (ref 0.6–4.6)
LYMPHOCYTES NFR BLD AUTO: 45.3 %
MCH RBC QN AUTO: 29.9 PG (ref 27–31)
MCHC RBC AUTO-ENTMCNC: 31.6 G/DL (ref 33–36)
MCV RBC AUTO: 94.5 FL (ref 80–94)
MICROALBUMIN UR-MCNC: 7.4 UG/ML
MICROALBUMIN/CREAT RATIO PNL UR: 9.7 MG/GM CR (ref 0–30)
MONOCYTES # BLD AUTO: 0.55 X10(3)/MCL (ref 0.1–1.3)
MONOCYTES NFR BLD AUTO: 7.6 %
NEUTROPHILS # BLD AUTO: 3.19 X10(3)/MCL (ref 2.1–9.2)
NEUTROPHILS NFR BLD AUTO: 44.1 %
NITRITE UR QL STRIP.AUTO: NEGATIVE
PH UR STRIP.AUTO: 5.5 [PH]
PLATELET # BLD AUTO: 244 X10(3)/MCL (ref 130–400)
PMV BLD AUTO: 8.3 FL (ref 7.4–10.4)
PROT UR QL STRIP.AUTO: NEGATIVE
RBC # BLD AUTO: 4.92 X10(6)/MCL (ref 4.7–6.1)
RBC #/AREA URNS AUTO: NORMAL /HPF
RBC UR QL AUTO: ABNORMAL
SP GR UR STRIP.AUTO: 1.02 (ref 1–1.03)
SQUAMOUS #/AREA URNS AUTO: NORMAL /HPF
TRIGL SERPL-MCNC: 70 MG/DL (ref 34–140)
UROBILINOGEN UR STRIP-ACNC: 0.2
VLDLC SERPL CALC-MCNC: 14 MG/DL
WBC # SPEC AUTO: 7.24 X10(3)/MCL (ref 4.5–11.5)
WBC #/AREA URNS AUTO: NORMAL /HPF

## 2023-12-12 PROCEDURE — 80061 LIPID PANEL: CPT

## 2023-12-12 PROCEDURE — 82043 UR ALBUMIN QUANTITATIVE: CPT

## 2023-12-12 PROCEDURE — 83036 HEMOGLOBIN GLYCOSYLATED A1C: CPT

## 2023-12-12 PROCEDURE — 85025 COMPLETE CBC W/AUTO DIFF WBC: CPT

## 2023-12-12 PROCEDURE — 36415 COLL VENOUS BLD VENIPUNCTURE: CPT

## 2023-12-12 PROCEDURE — 81001 URINALYSIS AUTO W/SCOPE: CPT

## 2023-12-18 ENCOUNTER — OFFICE VISIT (OUTPATIENT)
Dept: INTERNAL MEDICINE | Facility: CLINIC | Age: 78
End: 2023-12-18
Payer: MEDICARE

## 2023-12-18 VITALS
SYSTOLIC BLOOD PRESSURE: 184 MMHG | HEART RATE: 75 BPM | TEMPERATURE: 98 F | RESPIRATION RATE: 16 BRPM | OXYGEN SATURATION: 97 % | HEIGHT: 69 IN | DIASTOLIC BLOOD PRESSURE: 82 MMHG | BODY MASS INDEX: 34.07 KG/M2 | WEIGHT: 230 LBS

## 2023-12-18 DIAGNOSIS — I10 WHITE COAT SYNDROME WITH DIAGNOSIS OF HYPERTENSION: ICD-10-CM

## 2023-12-18 DIAGNOSIS — E11.42 TYPE 2 DIABETES MELLITUS WITH DIABETIC POLYNEUROPATHY, WITHOUT LONG-TERM CURRENT USE OF INSULIN: Primary | ICD-10-CM

## 2023-12-18 PROCEDURE — 1101F PT FALLS ASSESS-DOCD LE1/YR: CPT | Mod: CPTII,,, | Performed by: INTERNAL MEDICINE

## 2023-12-18 PROCEDURE — 3288F PR FALLS RISK ASSESSMENT DOCUMENTED: ICD-10-PCS | Mod: CPTII,,, | Performed by: INTERNAL MEDICINE

## 2023-12-18 PROCEDURE — 99213 PR OFFICE/OUTPT VISIT, EST, LEVL III, 20-29 MIN: ICD-10-PCS | Mod: ,,, | Performed by: INTERNAL MEDICINE

## 2023-12-18 PROCEDURE — 1160F RVW MEDS BY RX/DR IN RCRD: CPT | Mod: CPTII,,, | Performed by: INTERNAL MEDICINE

## 2023-12-18 PROCEDURE — 1159F MED LIST DOCD IN RCRD: CPT | Mod: CPTII,,, | Performed by: INTERNAL MEDICINE

## 2023-12-18 PROCEDURE — 3077F PR MOST RECENT SYSTOLIC BLOOD PRESSURE >= 140 MM HG: ICD-10-PCS | Mod: CPTII,,, | Performed by: INTERNAL MEDICINE

## 2023-12-18 PROCEDURE — 3288F FALL RISK ASSESSMENT DOCD: CPT | Mod: CPTII,,, | Performed by: INTERNAL MEDICINE

## 2023-12-18 PROCEDURE — 1160F PR REVIEW ALL MEDS BY PRESCRIBER/CLIN PHARMACIST DOCUMENTED: ICD-10-PCS | Mod: CPTII,,, | Performed by: INTERNAL MEDICINE

## 2023-12-18 PROCEDURE — 3077F SYST BP >= 140 MM HG: CPT | Mod: CPTII,,, | Performed by: INTERNAL MEDICINE

## 2023-12-18 PROCEDURE — 99213 OFFICE O/P EST LOW 20 MIN: CPT | Mod: ,,, | Performed by: INTERNAL MEDICINE

## 2023-12-18 PROCEDURE — 3079F PR MOST RECENT DIASTOLIC BLOOD PRESSURE 80-89 MM HG: ICD-10-PCS | Mod: CPTII,,, | Performed by: INTERNAL MEDICINE

## 2023-12-18 PROCEDURE — 3079F DIAST BP 80-89 MM HG: CPT | Mod: CPTII,,, | Performed by: INTERNAL MEDICINE

## 2023-12-18 PROCEDURE — 1101F PR PT FALLS ASSESS DOC 0-1 FALLS W/OUT INJ PAST YR: ICD-10-PCS | Mod: CPTII,,, | Performed by: INTERNAL MEDICINE

## 2023-12-18 PROCEDURE — 1159F PR MEDICATION LIST DOCUMENTED IN MEDICAL RECORD: ICD-10-PCS | Mod: CPTII,,, | Performed by: INTERNAL MEDICINE

## 2023-12-18 NOTE — PROGRESS NOTES
Subjective:      Patient ID: Galdino Joshi is a 78 y.o. male.    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  Blood pressure high today monitors at home and he is always good he is going to call us with an update when he gets home  No complaints advised on RSV vaccine      Past Medical History:  Past Medical History:   Diagnosis Date    BPH (benign prostatic hyperplasia)     Essential (primary) hypertension     Mixed hyperlipidemia     Proteinuria     Type 2 diabetes mellitus without complication, without long-term current use of insulin      Past Surgical History:   Procedure Laterality Date    CARPAL TUNNEL RELEASE N/A     CARPAL TUNNEL RELEASE N/A     neuroplasty median nerve at carpal tunnel  Right     RELEASE OF ULNAR NERVE AT CUBITAL TUNNEL Right     SPINAL FUSION       Review of patient's allergies indicates:  No Known Allergies  Current Outpatient Medications on File Prior to Visit   Medication Sig Dispense Refill    amLODIPine (NORVASC) 10 MG tablet Take 1 tablet (10 mg total) by mouth once daily. 90 tablet 3    atorvastatin (LIPITOR) 80 MG tablet Take 1 tablet (80 mg total) by mouth every evening. 90 tablet 3    cholecalciferol, vitamin D3, 1,250 mcg (50,000 unit) capsule Take 1 capsule (50,000 Units total) by mouth once a week. 8 capsule 0    colchicine (COLCRYS) 0.6 mg tablet 1.2 mg p.o. x1 then 0.6 mg p.o. 1 hour later x1 3 tablet 4    ergocalciferol (ERGOCALCIFEROL) 50,000 unit Cap TAKE 1 CAPSULE EVERY WEEK 13 capsule 4    finasteride (PROSCAR) 5 mg tablet TAKE 1 TABLET EVERY DAY 90 tablet 4    metFORMIN (GLUCOPHAGE) 1000 MG tablet TAKE 1 TABLET TWICE DAILY 180 tablet 4    metoprolol succinate (TOPROL-XL) 100 MG 24 hr tablet Take 1 tablet (100 mg total) by mouth once daily. 90 tablet 3    olmesartan-hydrochlorothiazide (BENICAR HCT) 40-25  mg per tablet TAKE 1 TABLET EVERY DAY 90 tablet 4    tirzepatide (MOUNJARO) 5 mg/0.5 mL PnIj Inject 5 mg into the skin every 7 days. 4 pen 5    gabapentin (NEURONTIN) 300 MG capsule Take 1 capsule (300 mg total) by mouth 3 (three) times daily as needed (neuropathic pain). 90 capsule 0    [DISCONTINUED] pyrilamine-chlophedianoL 12.5-12.5 mg/5 mL Liqd Take 10 mLs by mouth every 8 (eight) hours as needed (cough). 180 mL 0     No current facility-administered medications on file prior to visit.     Social History     Socioeconomic History    Marital status:    Tobacco Use    Smoking status: Never    Smokeless tobacco: Current     Types: Chew   Substance and Sexual Activity    Alcohol use: Yes     Alcohol/week: 35.0 standard drinks of alcohol     Types: 35 Cans of beer per week    Drug use: Never    Sexual activity: Not Currently   Social History Narrative    Pt lives with wife in home and has help from daughter     Social Determinants of Health     Financial Resource Strain: Low Risk  (5/6/2022)    Overall Financial Resource Strain (CARDIA)     Difficulty of Paying Living Expenses: Not hard at all   Food Insecurity: No Food Insecurity (5/6/2022)    Hunger Vital Sign     Worried About Running Out of Food in the Last Year: Never true     Ran Out of Food in the Last Year: Never true   Transportation Needs: No Transportation Needs (5/6/2022)    PRAPARE - Transportation     Lack of Transportation (Medical): No     Lack of Transportation (Non-Medical): No   Physical Activity: Sufficiently Active (5/6/2022)    Exercise Vital Sign     Days of Exercise per Week: 7 days     Minutes of Exercise per Session: 60 min   Stress: No Stress Concern Present (12/18/2023)    [employer] [place] [employer] - Occupational Stress Questionnaire     Feeling of Stress : Not at all   Social Connections: Socially Integrated (5/6/2022)    Social Connection and Isolation Panel [NHANES]     Frequency of Communication with Friends and  "Family: More than three times a week     Frequency of Social Gatherings with Friends and Family: Once a week     Attends Holiness Services: More than 4 times per year     Active Member of Clubs or Organizations: No     Attends Club or Organization Meetings: 1 to 4 times per year     Marital Status:    Housing Stability: Low Risk  (12/18/2023)    Housing Stability Vital Sign     Unable to Pay for Housing in the Last Year: No     Number of Places Lived in the Last Year: 1     Unstable Housing in the Last Year: No     Family History   Problem Relation Age of Onset    Heart disease Mother        Review of Systems  A comprehensive review of systems was performed and was negative with exception of what is documented above.     Objective:   BP (!) 184/82 (BP Location: Right arm, Patient Position: Sitting, BP Method: Medium (Manual))   Pulse 75   Temp 97.9 °F (36.6 °C) (Temporal)   Resp 16   Ht 5' 9" (1.753 m)   Wt 104.3 kg (230 lb)   SpO2 97%   BMI 33.97 kg/m²   Physical Exam  General : Alert and oriented, No acute distress, afebrile.  Eye : PERRLA. EOMI. Normal conjunctiva, Sclerae are nonicteric.   Musculoskeletal : Normal range of motion throughout. No muscle tenderness.  Integumentary : Warm, moist, intact.  Neurologic : Alert, Oriented.  Psychiatric : Cooperative, Appropriate mood & affect.   Assessment/ Plan:   1. Type 2 diabetes mellitus with diabetic polyneuropathy, without long-term current use of insulin  Assessment & Plan:  Increase Mounjaro dose to 5 mg  RTC 3 months for revisit    Orders:  -     Hemoglobin A1C; Future; Expected date: 03/18/2024  -     Comprehensive Metabolic Panel; Future; Expected date: 03/18/2024    2. White coat syndrome with diagnosis of hypertension             Follow up in about 3 months (around 3/18/2024) for DIABETIC REVISIT, NURSE PRACTITIONER, with labs prior to visit.    "

## 2024-01-18 ENCOUNTER — TELEPHONE (OUTPATIENT)
Dept: INTERNAL MEDICINE | Facility: CLINIC | Age: 79
End: 2024-01-18
Payer: MEDICARE

## 2024-01-18 DIAGNOSIS — E11.42 TYPE 2 DIABETES MELLITUS WITH DIABETIC POLYNEUROPATHY, WITHOUT LONG-TERM CURRENT USE OF INSULIN: Primary | ICD-10-CM

## 2024-01-18 RX ORDER — TIRZEPATIDE 5 MG/.5ML
5 INJECTION, SOLUTION SUBCUTANEOUS
Qty: 4 PEN | Refills: 5 | Status: SHIPPED | OUTPATIENT
Start: 2024-01-18

## 2024-01-18 NOTE — TELEPHONE ENCOUNTER
----- Message from Ciara Collins sent at 1/18/2024 11:35 AM CST -----  Regarding: med refill  .Type:  RX Refill Request    Who Called: Pt  Refill or New Rx:Refill  RX Name and Strength:tirzepatide (MOUNJARO) 5 mg/0.5 mL PnIj    How is the patient currently taking it? (ex. 1XDay):  Is this a 30 day or 90 day RX:  Preferred Pharmacy with phone number:[facility] - [address]   Local or Mail Order:Local  Ordering Provider:Kirstie   Would the patient rather a call back or a response via [facility]? Call back  Best Call Back Number:[phone]  Additional Information:

## 2024-01-31 DIAGNOSIS — I15.2 HYPERTENSION ASSOCIATED WITH DIABETES: ICD-10-CM

## 2024-01-31 DIAGNOSIS — E11.59 HYPERTENSION ASSOCIATED WITH DIABETES: ICD-10-CM

## 2024-01-31 RX ORDER — AMLODIPINE BESYLATE 10 MG/1
10 TABLET ORAL
Qty: 90 TABLET | Refills: 3 | Status: SHIPPED | OUTPATIENT
Start: 2024-01-31

## 2024-03-08 ENCOUNTER — TELEPHONE (OUTPATIENT)
Dept: INTERNAL MEDICINE | Facility: CLINIC | Age: 79
End: 2024-03-08
Payer: MEDICARE

## 2024-03-08 NOTE — TELEPHONE ENCOUNTER
1. Are there any outstanding tasks in the patient's chart? Yes, fasting labs    2. Is there any documentation in the chart? No    3.Has patient been seen in an ER, Urgent care clinic, or been admitted since last visit?  If yes, When, where, and why    4. Has patient seen any other healthcare providers since last visit?  If yes, when, where, and why    5. Has patient had any bloodwork or XR done since last visit?    6. Is patient signed up for patient portal?    ATC pt LVM stating appt date and time and that labs are needed before appt

## 2024-03-25 ENCOUNTER — LAB VISIT (OUTPATIENT)
Dept: LAB | Facility: HOSPITAL | Age: 79
End: 2024-03-25
Attending: INTERNAL MEDICINE
Payer: MEDICARE

## 2024-03-25 DIAGNOSIS — E11.42 TYPE 2 DIABETES MELLITUS WITH DIABETIC POLYNEUROPATHY, WITHOUT LONG-TERM CURRENT USE OF INSULIN: ICD-10-CM

## 2024-03-25 LAB
ALBUMIN SERPL-MCNC: 3.9 G/DL (ref 3.4–4.8)
ALBUMIN/GLOB SERPL: 1.1 RATIO (ref 1.1–2)
ALP SERPL-CCNC: 119 UNIT/L (ref 40–150)
ALT SERPL-CCNC: 19 UNIT/L (ref 0–55)
AST SERPL-CCNC: 20 UNIT/L (ref 5–34)
BILIRUB SERPL-MCNC: 0.9 MG/DL
BUN SERPL-MCNC: 6.9 MG/DL (ref 8.4–25.7)
CALCIUM SERPL-MCNC: 10.2 MG/DL (ref 8.8–10)
CHLORIDE SERPL-SCNC: 105 MMOL/L (ref 98–107)
CO2 SERPL-SCNC: 24 MMOL/L (ref 23–31)
CREAT SERPL-MCNC: 0.97 MG/DL (ref 0.73–1.18)
EST. AVERAGE GLUCOSE BLD GHB EST-MCNC: 119.8 MG/DL
GFR SERPLBLD CREATININE-BSD FMLA CKD-EPI: >60 MLS/MIN/1.73/M2
GLOBULIN SER-MCNC: 3.7 GM/DL (ref 2.4–3.5)
GLUCOSE SERPL-MCNC: 108 MG/DL (ref 82–115)
HBA1C MFR BLD: 5.8 %
POTASSIUM SERPL-SCNC: 3.6 MMOL/L (ref 3.5–5.1)
PROT SERPL-MCNC: 7.6 GM/DL (ref 5.8–7.6)
SODIUM SERPL-SCNC: 136 MMOL/L (ref 136–145)

## 2024-03-25 PROCEDURE — 80053 COMPREHEN METABOLIC PANEL: CPT

## 2024-03-25 PROCEDURE — 83036 HEMOGLOBIN GLYCOSYLATED A1C: CPT

## 2024-03-25 PROCEDURE — 36415 COLL VENOUS BLD VENIPUNCTURE: CPT

## 2024-03-26 ENCOUNTER — OFFICE VISIT (OUTPATIENT)
Dept: INTERNAL MEDICINE | Facility: CLINIC | Age: 79
End: 2024-03-26
Payer: MEDICARE

## 2024-03-26 VITALS
SYSTOLIC BLOOD PRESSURE: 160 MMHG | HEART RATE: 67 BPM | HEIGHT: 69 IN | RESPIRATION RATE: 16 BRPM | OXYGEN SATURATION: 98 % | TEMPERATURE: 98 F | DIASTOLIC BLOOD PRESSURE: 84 MMHG | BODY MASS INDEX: 33.47 KG/M2 | WEIGHT: 226 LBS

## 2024-03-26 DIAGNOSIS — E11.42 TYPE 2 DIABETES MELLITUS WITH DIABETIC POLYNEUROPATHY, WITHOUT LONG-TERM CURRENT USE OF INSULIN: ICD-10-CM

## 2024-03-26 DIAGNOSIS — E66.01 SEVERE OBESITY (BMI 35.0-39.9) WITH COMORBIDITY: ICD-10-CM

## 2024-03-26 PROCEDURE — 3077F SYST BP >= 140 MM HG: CPT | Mod: CPTII,,, | Performed by: INTERNAL MEDICINE

## 2024-03-26 PROCEDURE — 3079F DIAST BP 80-89 MM HG: CPT | Mod: CPTII,,, | Performed by: INTERNAL MEDICINE

## 2024-03-26 PROCEDURE — 1159F MED LIST DOCD IN RCRD: CPT | Mod: CPTII,,, | Performed by: INTERNAL MEDICINE

## 2024-03-26 PROCEDURE — 99213 OFFICE O/P EST LOW 20 MIN: CPT | Mod: ,,, | Performed by: INTERNAL MEDICINE

## 2024-03-26 PROCEDURE — 1160F RVW MEDS BY RX/DR IN RCRD: CPT | Mod: CPTII,,, | Performed by: INTERNAL MEDICINE

## 2024-03-26 PROCEDURE — 3288F FALL RISK ASSESSMENT DOCD: CPT | Mod: CPTII,,, | Performed by: INTERNAL MEDICINE

## 2024-03-26 PROCEDURE — 1101F PT FALLS ASSESS-DOCD LE1/YR: CPT | Mod: CPTII,,, | Performed by: INTERNAL MEDICINE

## 2024-03-26 NOTE — ASSESSMENT & PLAN NOTE
Patient's FSGs are controlled on current medication regimen.  Last A1c reviewed-   Lab Results   Component Value Date    HGBA1C 5.8 03/25/2024   Continue current dose of Mounjaro and metformin, RTC 3 months for diabetic revisit  
89y Female with PMHx of HTN, arthritis presented to [facility] today after sudden onset of dizziness that began this morning at 0930. Patient was in her usual state of health this morning and had a sudden onset of headache with dizziness and unsteadiness while she was in the bathroom. Her daughter, who the patient lives with, saw that she was feeling unwell and helped her to bed and gave her Tylenol and water. The patient did not experience any room spinning, but felt like she was going to fall. No reported falls or LOC. Symptoms improved after the patient laid down but they did not go away. Symptoms worsen with movement. NIHSS 0 at [facility]. CTH negative for any acute intracranial pathology. CTA negative for high grade stenosis or LVO. CTP with right cerebellar and bilateral posterior cerebral Tmax elevation, but delayed bolus curves.  Pt. presenting hypertensive with SBP ~200s

## 2024-03-26 NOTE — PROGRESS NOTES
Internal Medicine    Patient ID: [id number]     Chief Complaint: Diabetes (3 month f/u)      HPI:     Galdino Joshi is a 78 y.o. male here today for a follow up.   Diabetic revisit fasting sugar noted to be at 1:08 a.m. A1c at 5.8 he is lost 24 lb since initiation of current regimen, kidney function is the best it is looked in quite some time BUN of 6.9 creatinine 0.97.  He has no complaints  Past Medical History:   Diagnosis Date    BPH (benign prostatic hyperplasia)     Essential (primary) hypertension     Mixed hyperlipidemia     Proteinuria     Type 2 diabetes mellitus without complication, without long-term current use of insulin         Past Surgical History:   Procedure Laterality Date    CARPAL TUNNEL RELEASE N/A     CARPAL TUNNEL RELEASE N/A     neuroplasty median nerve at carpal tunnel  Right     RELEASE OF ULNAR NERVE AT CUBITAL TUNNEL Right     SPINAL FUSION          Social History     Tobacco Use    Smoking status: Never    Smokeless tobacco: Current     Types: Chew   Substance and Sexual Activity    Alcohol use: Yes     Alcohol/week: 35.0 standard drinks of alcohol     Types: 35 Cans of beer per week    Drug use: Never    Sexual activity: Not Currently        Current Outpatient Medications   Medication Instructions    amLODIPine (NORVASC) 10 mg, Oral    atorvastatin (LIPITOR) 80 mg, Oral, Nightly    cholecalciferol (vitamin D3) 50,000 Units, Oral, Weekly    colchicine (COLCRYS) 0.6 mg tablet 1.2 mg p.o. x1 then 0.6 mg p.o. 1 hour later x1    ergocalciferol (ERGOCALCIFEROL) 50,000 unit Cap TAKE 1 CAPSULE EVERY WEEK    finasteride (PROSCAR) 5 mg tablet TAKE 1 TABLET EVERY DAY    gabapentin (NEURONTIN) 300 mg, Oral, 3 times daily PRN    metFORMIN (GLUCOPHAGE) 1000 MG tablet TAKE 1 TABLET TWICE DAILY    metoprolol succinate (TOPROL-XL) 100 mg, Oral, Daily    MOUNJARO 5 mg, Subcutaneous, Every 7 days    olmesartan-hydrochlorothiazide (BENICAR HCT) 40-25 mg per tablet TAKE 1 TABLET EVERY DAY       Review  "of patient's allergies indicates:  No Known Allergies     Patient Care Team:  Francesco Salomon MD as PCP - General (Internal Medicine)  Fernandez Sharp MD as Consulting Physician (Urology)  Kristen Summers as Consulting Physician (Optometry)  Dwayne Adorno MD as Consulting Physician (Cardiology)  Preeti Dawkins MD as Consulting Physician (Hematology and Oncology)     Subjective:     Review of Systems    12 point review of systems conducted, negative except as stated in the history of present illness. See HPI for details.    Objective:     Visit Vitals  BP (!) 160/84 (BP Location: Right arm, Patient Position: Sitting, BP Method: Medium (Manual))   Pulse 67   Temp 98 °F (36.7 °C) (Temporal)   Resp 16   Ht 5' 9" (1.753 m)   Wt 102.5 kg (226 lb)   SpO2 98%   BMI 33.37 kg/m²       Physical Exam  Constitutional:       Appearance: He is obese.   HENT:      Head: Normocephalic and atraumatic.   Eyes:      Extraocular Movements: Extraocular movements intact.      Pupils: Pupils are equal, round, and reactive to light.   Cardiovascular:      Rate and Rhythm: Normal rate and regular rhythm.   Pulmonary:      Effort: Pulmonary effort is normal.      Breath sounds: Normal breath sounds.   Abdominal:      General: Bowel sounds are normal.      Palpations: Abdomen is soft.   Musculoskeletal:         General: Normal range of motion.   Skin:     General: Skin is warm and dry.   Neurological:      General: No focal deficit present.      Mental Status: He is alert.   Psychiatric:         Mood and Affect: Mood normal.         Labs Reviewed:     Chemistry:  Lab Results   Component Value Date     03/25/2024    K 3.6 03/25/2024    CHLORIDE 105 03/25/2024    BUN 6.9 (L) 03/25/2024    CREATININE 0.97 03/25/2024    EGFRNORACEVR >60 03/25/2024    GLUCOSE 108 03/25/2024    CALCIUM 10.2 (H) 03/25/2024    ALKPHOS 119 03/25/2024    LABPROT 7.6 03/25/2024    ALBUMIN 3.9 03/25/2024    BILIDIR 0.2 05/03/2022    IBILI 0.30 " 05/03/2022    AST 20 03/25/2024    ALT 19 03/25/2024    HMWHYCXF36GZ 18.6 (L) 08/09/2023    TSH 5.566 (H) 08/09/2023    GAZYXG5KZDH 1.02 08/09/2023    PSA 7.49 (H) 05/03/2022        Lab Results   Component Value Date    HGBA1C 5.8 03/25/2024        Hematology:  Lab Results   Component Value Date    WBC 7.24 12/12/2023    HGB 14.7 12/12/2023    HCT 46.5 12/12/2023     12/12/2023       Lipid Panel:  Lab Results   Component Value Date    CHOL 181 12/12/2023    HDL 65 (H) 12/12/2023    .00 12/12/2023    TRIG 70 12/12/2023    TOTALCHOLEST 3 12/12/2023        Urine:  Lab Results   Component Value Date    COLORUA Yellow 12/12/2023    APPEARANCEUA Clear 12/12/2023    SGUA 1.020 12/12/2023    PHUA 5.5 12/12/2023    PROTEINUA Negative 12/12/2023    GLUCOSEUA Negative 12/12/2023    KETONESUA Negative 12/12/2023    BLOODUA Trace-Lysed (A) 12/12/2023    NITRITESUA Negative 12/12/2023    LEUKOCYTESUR Negative 12/12/2023    RBCUA 0-2 12/12/2023    WBCUA None Seen 12/12/2023    BACTERIA None Seen 12/12/2023    CREATRANDUR 75.9 12/12/2023        Assessment:       ICD-10-CM ICD-9-CM   1. Type 2 diabetes mellitus with diabetic polyneuropathy, without long-term current use of insulin  E11.42 250.60     357.2   2. Severe obesity (BMI 35.0-39.9) with comorbidity  E66.01 278.01        Plan:     1. Type 2 diabetes mellitus with diabetic polyneuropathy, without long-term current use of insulin  Assessment & Plan:  Patient's FSGs are controlled on current medication regimen.  Last A1c reviewed-   Lab Results   Component Value Date    HGBA1C 5.8 03/25/2024   Continue current dose of Mounjaro and metformin, RTC 3 months for diabetic revisit      2. Severe obesity (BMI 35.0-39.9) with comorbidity         Follow up in about 3 months (around 6/26/2024) for DIABETIC REVISIT, with labs prior to visit. In addition to their scheduled follow up, the patient has also been instructed to follow up on as needed basis.     Future Appointments    Date Time Provider Department Center   5/14/2024 10:30 AM [facility]   6/27/2024 11:00 AM Francesco Salomon MD [facility]        Francesco Salomon MD

## 2024-05-08 ENCOUNTER — LAB VISIT (OUTPATIENT)
Dept: LAB | Facility: HOSPITAL | Age: 79
End: 2024-05-08
Attending: INTERNAL MEDICINE
Payer: MEDICARE

## 2024-05-08 DIAGNOSIS — D47.2 MONOCLONAL GAMMOPATHY OF UNKNOWN SIGNIFICANCE (MGUS): ICD-10-CM

## 2024-05-08 LAB
ALBUMIN SERPL-MCNC: 3.8 G/DL (ref 3.4–4.8)
ALBUMIN/GLOB SERPL: 1.1 RATIO (ref 1.1–2)
ALP SERPL-CCNC: 120 UNIT/L (ref 40–150)
ALT SERPL-CCNC: 20 UNIT/L (ref 0–55)
AST SERPL-CCNC: 16 UNIT/L (ref 5–34)
BASOPHILS # BLD AUTO: 0.01 X10(3)/MCL
BASOPHILS NFR BLD AUTO: 0.1 %
BILIRUB SERPL-MCNC: 0.8 MG/DL
BUN SERPL-MCNC: 7.5 MG/DL (ref 8.4–25.7)
CALCIUM SERPL-MCNC: 10 MG/DL (ref 8.8–10)
CHLORIDE SERPL-SCNC: 108 MMOL/L (ref 98–107)
CO2 SERPL-SCNC: 24 MMOL/L (ref 23–31)
CREAT SERPL-MCNC: 0.99 MG/DL (ref 0.73–1.18)
EOSINOPHIL # BLD AUTO: 0.3 X10(3)/MCL (ref 0–0.9)
EOSINOPHIL NFR BLD AUTO: 4.4 %
ERYTHROCYTE [DISTWIDTH] IN BLOOD BY AUTOMATED COUNT: 13.6 % (ref 11.5–17)
GFR SERPLBLD CREATININE-BSD FMLA CKD-EPI: >60 ML/MIN/1.73/M2
GLOBULIN SER-MCNC: 3.6 GM/DL (ref 2.4–3.5)
GLUCOSE SERPL-MCNC: 102 MG/DL (ref 82–115)
HCT VFR BLD AUTO: 47 % (ref 42–52)
HGB BLD-MCNC: 15.5 G/DL (ref 14–18)
IGA SERPL-MCNC: 197 MG/DL (ref 101–645)
IGG SERPL-MCNC: 1650 MG/DL (ref 540–1822)
IGM SERPL-MCNC: 383 MG/DL (ref 22–240)
IMM GRANULOCYTES # BLD AUTO: 0.01 X10(3)/MCL (ref 0–0.04)
IMM GRANULOCYTES NFR BLD AUTO: 0.1 %
LYMPHOCYTES # BLD AUTO: 3.24 X10(3)/MCL (ref 0.6–4.6)
LYMPHOCYTES NFR BLD AUTO: 47.1 %
MCH RBC QN AUTO: 30.4 PG (ref 27–31)
MCHC RBC AUTO-ENTMCNC: 33 G/DL (ref 33–36)
MCV RBC AUTO: 92.2 FL (ref 80–94)
MONOCYTES # BLD AUTO: 0.57 X10(3)/MCL (ref 0.1–1.3)
MONOCYTES NFR BLD AUTO: 8.3 %
NEUTROPHILS # BLD AUTO: 2.75 X10(3)/MCL (ref 2.1–9.2)
NEUTROPHILS NFR BLD AUTO: 40 %
PLATELET # BLD AUTO: 224 X10(3)/MCL (ref 130–400)
PMV BLD AUTO: 8.4 FL (ref 7.4–10.4)
POTASSIUM SERPL-SCNC: 3.7 MMOL/L (ref 3.5–5.1)
PROT SERPL-MCNC: 7.4 GM/DL (ref 5.8–7.6)
RBC # BLD AUTO: 5.1 X10(6)/MCL (ref 4.7–6.1)
SODIUM SERPL-SCNC: 140 MMOL/L (ref 136–145)
WBC # SPEC AUTO: 6.88 X10(3)/MCL (ref 4.5–11.5)

## 2024-05-08 PROCEDURE — 36415 COLL VENOUS BLD VENIPUNCTURE: CPT

## 2024-05-08 PROCEDURE — 80053 COMPREHEN METABOLIC PANEL: CPT

## 2024-05-08 PROCEDURE — 83521 IG LIGHT CHAINS FREE EACH: CPT | Mod: 59

## 2024-05-08 PROCEDURE — 84165 PROTEIN E-PHORESIS SERUM: CPT

## 2024-05-08 PROCEDURE — 85025 COMPLETE CBC W/AUTO DIFF WBC: CPT

## 2024-05-08 PROCEDURE — 82784 ASSAY IGA/IGD/IGG/IGM EACH: CPT

## 2024-05-09 LAB
ALBUMIN % SPEP (OHS): 47.82
ALBUMIN SERPL-MCNC: 3.6 G/DL (ref 3.4–4.8)
ALBUMIN/GLOB SERPL: 0.9 RATIO (ref 1.1–2)
ALPHA 1 GLOB (OHS): 0.24 GM/DL
ALPHA 1 GLOB% (OHS): 3.25
ALPHA 2 GLOB % (OHS): 8.63
ALPHA 2 GLOB (OHS): 0.65 GM/DL
BETA GLOB (OHS): 1.12 GM/DL
BETA GLOB% (OHS): 14.93
GAMMA GLOBULIN % (OHS): 25.37
GAMMA GLOBULIN (OHS): 1.9 GM/DL
GLOBULIN SER-MCNC: 3.9 GM/DL (ref 2.4–3.5)
KAPPA LC FREE SER-MCNC: 3.03 MG/DL (ref 0.33–1.94)
KAPPA LC FREE/LAMBDA FREE SER: 0.87 {RATIO} (ref 0.26–1.65)
LAMBDA LC FREE SERPL-MCNC: 3.49 MG/DL (ref 0.57–2.63)
M SPIKE % (OHS): ABNORMAL
M SPIKE (OHS): ABNORMAL
PATH REV: NORMAL
PROT SERPL-MCNC: 7.5 GM/DL (ref 5.8–7.6)

## 2024-05-14 ENCOUNTER — OFFICE VISIT (OUTPATIENT)
Dept: HEMATOLOGY/ONCOLOGY | Facility: CLINIC | Age: 79
End: 2024-05-14
Payer: MEDICARE

## 2024-05-14 VITALS
RESPIRATION RATE: 17 BRPM | DIASTOLIC BLOOD PRESSURE: 72 MMHG | HEIGHT: 69 IN | WEIGHT: 226.19 LBS | TEMPERATURE: 98 F | SYSTOLIC BLOOD PRESSURE: 153 MMHG | OXYGEN SATURATION: 96 % | BODY MASS INDEX: 33.5 KG/M2 | HEART RATE: 71 BPM

## 2024-05-14 DIAGNOSIS — D47.2 MONOCLONAL GAMMOPATHY OF UNKNOWN SIGNIFICANCE (MGUS): Primary | ICD-10-CM

## 2024-05-14 PROCEDURE — 1101F PT FALLS ASSESS-DOCD LE1/YR: CPT | Mod: CPTII,S$GLB,, | Performed by: NURSE PRACTITIONER

## 2024-05-14 PROCEDURE — 3078F DIAST BP <80 MM HG: CPT | Mod: CPTII,S$GLB,, | Performed by: NURSE PRACTITIONER

## 2024-05-14 PROCEDURE — 1126F AMNT PAIN NOTED NONE PRSNT: CPT | Mod: CPTII,S$GLB,, | Performed by: NURSE PRACTITIONER

## 2024-05-14 PROCEDURE — 99214 OFFICE O/P EST MOD 30 MIN: CPT | Mod: S$GLB,,, | Performed by: NURSE PRACTITIONER

## 2024-05-14 PROCEDURE — 1159F MED LIST DOCD IN RCRD: CPT | Mod: CPTII,S$GLB,, | Performed by: NURSE PRACTITIONER

## 2024-05-14 PROCEDURE — 3077F SYST BP >= 140 MM HG: CPT | Mod: CPTII,S$GLB,, | Performed by: NURSE PRACTITIONER

## 2024-05-14 PROCEDURE — 3288F FALL RISK ASSESSMENT DOCD: CPT | Mod: CPTII,S$GLB,, | Performed by: NURSE PRACTITIONER

## 2024-05-14 PROCEDURE — 99999 PR PBB SHADOW E&M-EST. PATIENT-LVL IV: CPT | Mod: PBBFAC,,, | Performed by: NURSE PRACTITIONER

## 2024-05-14 NOTE — PROGRESS NOTES
HEMATOLOGY/ONCOLOGY OFFICE CLINIC VISIT    Visit Information:    Initial Evaluation: 8/30/2018, 11/15/2022  Referring Provider: Dr Thacker  Other providers:Dr Sharp  Code status:Not addressed    Diagnosis:  MGUS    Present treatment:    Treatment/Oncology history:    Plan of care: MM w/u    Imaging:  Skeletal Survey 8/31/2018: No suspicious osseous lesions are identified radiographically.      Pathology:      CLINICAL HISTORY:       Patient: Galdino Joshi is a 78 y.o. male  kindly referred by Dr. Thacker for evaluation of gammopathy. I saw him back on 8/30/2018 but lost followup. He did not return to the clinic. At the time, patient had routine blood work revealed increase in protein levels and further studies showed M-spikes in the gamma region. The monoclonal protein peaks collectively account for 1.68 g/dL of the total 1.92 g/dL of protein in the gamma region. JAILYN pattern shows IgG type lambda and IgM type kappa biclonal proteins.    Repeated labs 8/30/2018:  Immunofixation shows IgM monoclonal protein with kappa light chain specificity.  Immunofixation shows IgG monoclonal protein with lambda light chain specificity.  Two spikes observed.  0.3, 0.9    IgM   434  IgG   1571  IgA    146    Kappa 28.6  Lambda 38.6  K/L ratio 0.74    Patient also with elevated PSA for which has been follow since 2015 when he started with synmptoms of enlarge prostate.     Other than that he is doing well and does not have any complaints at all. He is here with his wife.   He denies any fever, chills, sweats. No chest pain or shortness of breath. Occ heaturia. No blood clots. No family history of blood disorders.      Chief Complaint: 6 Month Follow Up        Interval History:  Patient presents today for follow up for MGUS. MM labs drawn on 5/8/24. He has two mspikes ( 0.06 IgG lambda FLC, 0.45 IgM kappa FLC) . No amemia. Normal kidney function.  He is doing well and  has no complaints today.     Wt Readings from Last 5 Encounters:    05/14/24 102.6 kg (226 lb 3.2 oz)   03/26/24 102.5 kg (226 lb)   12/18/23 104.3 kg (230 lb)   11/13/23 105.2 kg (232 lb)   10/06/23 109.8 kg (242 lb)   ]  Past Medical History:   Diagnosis Date    BPH (benign prostatic hyperplasia)     Essential (primary) hypertension     Mixed hyperlipidemia     Proteinuria     Type 2 diabetes mellitus without complication, without long-term current use of insulin       Past Surgical History:   Procedure Laterality Date    CARPAL TUNNEL RELEASE N/A     CARPAL TUNNEL RELEASE N/A     neuroplasty median nerve at carpal tunnel  Right     RELEASE OF ULNAR NERVE AT CUBITAL TUNNEL Right     SPINAL FUSION       Family History   Problem Relation Name Age of Onset    Heart disease Mother              Review of patient's allergies indicates:  No Known Allergies   Current Outpatient Medications on File Prior to Visit   Medication Sig Dispense Refill    amLODIPine (NORVASC) 10 MG tablet TAKE 1 TABLET ONE TIME DAILY 90 tablet 3    atorvastatin (LIPITOR) 80 MG tablet Take 1 tablet (80 mg total) by mouth every evening. 90 tablet 3    cholecalciferol, vitamin D3, 1,250 mcg (50,000 unit) capsule Take 1 capsule (50,000 Units total) by mouth once a week. 8 capsule 0    colchicine (COLCRYS) 0.6 mg tablet 1.2 mg p.o. x1 then 0.6 mg p.o. 1 hour later x1 3 tablet 4    ergocalciferol (ERGOCALCIFEROL) 50,000 unit Cap TAKE 1 CAPSULE EVERY WEEK 13 capsule 4    finasteride (PROSCAR) 5 mg tablet TAKE 1 TABLET EVERY DAY 90 tablet 4    metFORMIN (GLUCOPHAGE) 1000 MG tablet TAKE 1 TABLET TWICE DAILY 180 tablet 4    metoprolol succinate (TOPROL-XL) 100 MG 24 hr tablet Take 1 tablet (100 mg total) by mouth once daily. 90 tablet 3    olmesartan-hydrochlorothiazide (BENICAR HCT) 40-25 mg per tablet TAKE 1 TABLET EVERY DAY 90 tablet 4    tirzepatide (MOUNJARO) 5 mg/0.5 mL PnIj Inject 5 mg into the skin every 7 days. 4 pen 5    gabapentin (NEURONTIN) 300 MG capsule Take 1 capsule (300 mg total) by mouth 3 (three)  "times daily as needed (neuropathic pain). 90 capsule 0     No current facility-administered medications on file prior to visit.      Review of Systems   Constitutional:  Negative for activity change, appetite change, chills, diaphoresis, fatigue, fever and unexpected weight change.   HENT:  Negative for nasal congestion, mouth sores, nosebleeds, postnasal drip, sinus pressure/congestion, sore throat and trouble swallowing.    Eyes:  Negative for visual disturbance.   Respiratory:  Negative for cough and shortness of breath.    Cardiovascular:  Negative for chest pain and palpitations.   Gastrointestinal:  Negative for abdominal distention, abdominal pain, blood in stool, change in bowel habit, constipation, diarrhea, nausea and vomiting.   Endocrine: Negative.    Genitourinary:  Negative for bladder incontinence, decreased urine volume, difficulty urinating, dysuria, frequency, hematuria, scrotal swelling, testicular pain and urgency.   Musculoskeletal:  Negative for arthralgias, back pain, gait problem, joint swelling, leg pain, myalgias and neck pain.   Integumentary:  Negative for rash.   Neurological:  Negative for dizziness, tremors, seizures, syncope, speech difficulty, weakness, light-headedness, numbness, headaches and memory loss.   Hematological:  Negative for adenopathy. Does not bruise/bleed easily.   Psychiatric/Behavioral:  Negative for agitation, confusion, hallucinations, sleep disturbance and suicidal ideas. The patient is not nervous/anxious.               Vitals:    05/14/24 1037   BP: (!) 153/72   BP Location: Left arm   Patient Position: Sitting   Pulse: 71   Resp: 17   Temp: 97.9 °F (36.6 °C)   TempSrc: Oral   SpO2: 96%   Weight: 102.6 kg (226 lb 3.2 oz)   Height: 5' 9" (1.753 m)          Physical Exam  Vitals and nursing note reviewed.   Constitutional:       General: He is not in acute distress.     Appearance: Normal appearance.   HENT:      Head: Normocephalic and atraumatic.      " Mouth/Throat:      Mouth: Mucous membranes are moist.   Eyes:      General: No scleral icterus.     Extraocular Movements: Extraocular movements intact.      Conjunctiva/sclera: Conjunctivae normal.   Neck:      Vascular: No JVD.   Cardiovascular:      Rate and Rhythm: Normal rate and regular rhythm.      Heart sounds: No murmur heard.  Pulmonary:      Effort: Pulmonary effort is normal.      Breath sounds: Normal breath sounds. No wheezing or rhonchi.   Abdominal:      General: Bowel sounds are normal. There is no distension.      Palpations: Abdomen is soft.      Tenderness: There is no abdominal tenderness.   Musculoskeletal:         General: No swelling or deformity.      Cervical back: Neck supple.   Lymphadenopathy:      Head:      Right side of head: No submandibular adenopathy.      Left side of head: No submandibular adenopathy.      Cervical: No cervical adenopathy.      Upper Body:      Right upper body: No supraclavicular or axillary adenopathy.      Left upper body: No supraclavicular or axillary adenopathy.      Lower Body: No right inguinal adenopathy. No left inguinal adenopathy.   Skin:     General: Skin is warm.      Coloration: Skin is not jaundiced.      Findings: No rash.   Neurological:      General: No focal deficit present.      Mental Status: He is alert and oriented to person, place, and time.      Cranial Nerves: Cranial nerves 2-12 are intact.   Psychiatric:         Attention and Perception: Attention normal.         Behavior: Behavior is cooperative.       ECOG SCORE             Laboratory:  CBC with Differential:  Lab Results   Component Value Date    WBC 6.88 05/08/2024    RBC 5.10 05/08/2024    HGB 15.5 05/08/2024    HCT 47.0 05/08/2024    MCV 92.2 05/08/2024    MCH 30.4 05/08/2024    MCHC 33.0 05/08/2024    RDW 13.6 05/08/2024     05/08/2024    MPV 8.4 05/08/2024        CMP:  Sodium   Date Value Ref Range Status   05/08/2024 140 136 - 145 mmol/L Final     Comment:     @ [facility]      Potassium   Date Value Ref Range Status   05/08/2024 3.7 3.5 - 5.1 mmol/L Final     Comment:     @ [facility]     CO2   Date Value Ref Range Status   05/08/2024 24 23 - 31 mmol/L Final     Comment:     @ [facility]     Blood Urea Nitrogen   Date Value Ref Range Status   05/08/2024 7.5 (L) 8.4 - 25.7 mg/dL Final     Comment:     @ [facility]     Creatinine   Date Value Ref Range Status   05/08/2024 0.99 0.73 - 1.18 mg/dL Final     Comment:     @ [facility]     Calcium   Date Value Ref Range Status   05/08/2024 10.0 8.8 - 10.0 mg/dL Final     Comment:     @ [facility]     Albumin   Date Value Ref Range Status   05/08/2024 3.8 3.4 - 4.8 g/dL Final     Comment:     @ [facility]   05/08/2024 3.6 3.4 - 4.8 g/dL Final     Bilirubin Total   Date Value Ref Range Status   05/08/2024 0.8 <=1.5 mg/dL Final     Comment:     @ [facility]     ALP   Date Value Ref Range Status   05/08/2024 120 40 - 150 unit/L Final     Comment:     @ [facility]     AST   Date Value Ref Range Status   05/08/2024 16 5 - 34 unit/L Final     Comment:     @ [facility]     ALT   Date Value Ref Range Status   05/08/2024 20 0 - 55 unit/L Final     Comment:     @ [facility]     Estimated GFR-Non    Date Value Ref Range Status   05/03/2022 >60 mls/min/1.73/m2 Final         Assessment:             Plan:       Again, Discussed with the patient today that Multiple myeloma is a type of bone marrow cancer that forms in a type of white blood cell called a plasma cell. Plasma cells help you fight infections by making antibodies that recognize and attack germs but when they transformed into cancer cells then they are not efficient and don't work well. This cells can affect not only the bone marrow but the bones in general causing distinctive bone lesions called Lytic lesions. It also can affect the kidney. Discussed prognosis and treatment recomendations and indications.  Discussed Monoclonal gammopathy of undetermined significance (MGUS) and that is the most common of a spectrum of diseases called plasma  cell dyscrasias. The term MGUS denotes the presence of a monoclonal immunoglobulin (Ig), or M-protein, in the serum or urine in persons without evidence of multiple myeloma (MM), Waldenström macroglobulinemia (WM), amyloidosis (AL) or other lymphoproliferative disorders. The risk of progression to MM or other lymphoproliferative disorder is present at a constant rate throughout the remainder of a patient's life. This risk has been quantified at 1% per year.  I also discussed with the patient that elevated globulin can be associated with autoimmune disorders or recent infections/inflammation.  This is a disorder of the immune system so anything that affect immune system can increase the globulin level.       MGUS labs are stable. No indication for BMB at this time.   RTC in 6 months with NP, MM labs -  no urine  Labs will be done @ [facility] 1 week prior    The patient was given ample opportunity to ask questions and they were all answered to satisfaction; patient demonstrated understanding of what we discussed and is agreeable to the plan.         WERO Vasquez

## 2024-06-06 ENCOUNTER — TELEPHONE (OUTPATIENT)
Dept: INTERNAL MEDICINE | Facility: CLINIC | Age: 79
End: 2024-06-06
Payer: MEDICARE

## 2024-06-06 DIAGNOSIS — E11.42 TYPE 2 DIABETES MELLITUS WITH DIABETIC POLYNEUROPATHY, WITHOUT LONG-TERM CURRENT USE OF INSULIN: Primary | ICD-10-CM

## 2024-06-06 NOTE — TELEPHONE ENCOUNTER
----- Message from Gregory Tanner MA sent at 6/6/2024  9:48 AM CDT -----  Regarding: PV 6/27/24 @ 11:00 Dr. Thacker  1. Are there any outstanding tasks in the patient's chart? Yes, fasting labs    2. Is there any documentation in the chart? No    3.Has patient been seen in an ER, Urgent care clinic, or been admitted since last visit?  If yes, When, where, and why    4. Has patient seen any other healthcare providers since last visit?  If yes, when, where, and why    5. Has patient had any bloodwork or XR done since last visit?    6. Is patient signed up for patient portal?

## 2024-06-24 ENCOUNTER — LAB VISIT (OUTPATIENT)
Dept: LAB | Facility: HOSPITAL | Age: 79
End: 2024-06-24
Attending: INTERNAL MEDICINE
Payer: MEDICARE

## 2024-06-24 DIAGNOSIS — E78.5 HYPERLIPIDEMIA, UNSPECIFIED HYPERLIPIDEMIA TYPE: ICD-10-CM

## 2024-06-24 DIAGNOSIS — E11.42 TYPE 2 DIABETES MELLITUS WITH DIABETIC POLYNEUROPATHY, WITHOUT LONG-TERM CURRENT USE OF INSULIN: ICD-10-CM

## 2024-06-24 LAB
ALBUMIN SERPL-MCNC: 4.1 G/DL (ref 3.4–4.8)
ALBUMIN/GLOB SERPL: 1 RATIO (ref 1.1–2)
ALP SERPL-CCNC: 110 UNIT/L (ref 40–150)
ALT SERPL-CCNC: 18 UNIT/L (ref 0–55)
ANION GAP SERPL CALC-SCNC: 7 MEQ/L
AST SERPL-CCNC: 18 UNIT/L (ref 5–34)
BACTERIA #/AREA URNS AUTO: NORMAL /HPF
BILIRUB SERPL-MCNC: 0.7 MG/DL
BILIRUB UR QL STRIP.AUTO: NEGATIVE
BUN SERPL-MCNC: 9.6 MG/DL (ref 8.4–25.7)
CALCIUM SERPL-MCNC: 10.5 MG/DL (ref 8.8–10)
CHLORIDE SERPL-SCNC: 106 MMOL/L (ref 98–107)
CHOLEST SERPL-MCNC: 163 MG/DL
CHOLEST/HDLC SERPL: 3 {RATIO} (ref 0–5)
CLARITY UR: CLEAR
CO2 SERPL-SCNC: 24 MMOL/L (ref 23–31)
COLOR UR AUTO: YELLOW
CREAT SERPL-MCNC: 1.45 MG/DL (ref 0.73–1.18)
CREAT UR-MCNC: 195.1 MG/DL (ref 63–166)
CREAT/UREA NIT SERPL: 7
EST. AVERAGE GLUCOSE BLD GHB EST-MCNC: 125.5 MG/DL
GFR SERPLBLD CREATININE-BSD FMLA CKD-EPI: 50 ML/MIN/1.73/M2
GLOBULIN SER-MCNC: 4 GM/DL (ref 2.4–3.5)
GLUCOSE SERPL-MCNC: 104 MG/DL (ref 82–115)
GLUCOSE UR QL STRIP: NEGATIVE
HBA1C MFR BLD: 6 %
HDLC SERPL-MCNC: 54 MG/DL (ref 35–60)
HGB UR QL STRIP: NEGATIVE
KETONES UR QL STRIP: NEGATIVE
LDLC SERPL CALC-MCNC: 97 MG/DL (ref 50–140)
LEUKOCYTE ESTERASE UR QL STRIP: NEGATIVE
MICROALBUMIN UR-MCNC: 24.9 UG/ML
MICROALBUMIN/CREAT RATIO PNL UR: 12.8 MG/GM CR (ref 0–30)
NITRITE UR QL STRIP: NEGATIVE
PH UR STRIP: 5.5 [PH]
POTASSIUM SERPL-SCNC: 4 MMOL/L (ref 3.5–5.1)
PROT SERPL-MCNC: 8.1 GM/DL (ref 5.8–7.6)
PROT UR QL STRIP: NEGATIVE
RBC #/AREA URNS AUTO: NORMAL /HPF
SODIUM SERPL-SCNC: 137 MMOL/L (ref 136–145)
SP GR UR STRIP.AUTO: 1.02 (ref 1–1.03)
SQUAMOUS #/AREA URNS AUTO: NORMAL /HPF
TRIGL SERPL-MCNC: 61 MG/DL (ref 34–140)
UROBILINOGEN UR STRIP-ACNC: 0.2
VLDLC SERPL CALC-MCNC: 12 MG/DL
WBC #/AREA URNS AUTO: NORMAL /HPF

## 2024-06-24 PROCEDURE — 80061 LIPID PANEL: CPT

## 2024-06-24 PROCEDURE — 36415 COLL VENOUS BLD VENIPUNCTURE: CPT

## 2024-06-24 PROCEDURE — 82570 ASSAY OF URINE CREATININE: CPT

## 2024-06-24 PROCEDURE — 81003 URINALYSIS AUTO W/O SCOPE: CPT

## 2024-06-24 PROCEDURE — 80053 COMPREHEN METABOLIC PANEL: CPT

## 2024-06-24 PROCEDURE — 83036 HEMOGLOBIN GLYCOSYLATED A1C: CPT

## 2024-06-24 RX ORDER — ATORVASTATIN CALCIUM 80 MG/1
80 TABLET, FILM COATED ORAL NIGHTLY
Qty: 90 TABLET | Refills: 3 | Status: SHIPPED | OUTPATIENT
Start: 2024-06-24

## 2024-06-24 RX ORDER — METOPROLOL SUCCINATE 100 MG/1
100 TABLET, EXTENDED RELEASE ORAL
Qty: 90 TABLET | Refills: 3 | Status: SHIPPED | OUTPATIENT
Start: 2024-06-24

## 2024-06-27 ENCOUNTER — OFFICE VISIT (OUTPATIENT)
Dept: INTERNAL MEDICINE | Facility: CLINIC | Age: 79
End: 2024-06-27
Payer: MEDICARE

## 2024-06-27 VITALS
HEART RATE: 78 BPM | HEIGHT: 69 IN | OXYGEN SATURATION: 98 % | RESPIRATION RATE: 16 BRPM | TEMPERATURE: 98 F | SYSTOLIC BLOOD PRESSURE: 138 MMHG | WEIGHT: 220 LBS | BODY MASS INDEX: 32.58 KG/M2 | DIASTOLIC BLOOD PRESSURE: 82 MMHG

## 2024-06-27 DIAGNOSIS — E11.42 TYPE 2 DIABETES MELLITUS WITH DIABETIC POLYNEUROPATHY, WITHOUT LONG-TERM CURRENT USE OF INSULIN: ICD-10-CM

## 2024-06-27 DIAGNOSIS — N18.31 CHRONIC KIDNEY DISEASE, STAGE 3A: ICD-10-CM

## 2024-06-27 DIAGNOSIS — D47.2 MONOCLONAL GAMMOPATHY OF UNKNOWN SIGNIFICANCE (MGUS): Primary | ICD-10-CM

## 2024-06-27 RX ORDER — SEMAGLUTIDE 1.34 MG/ML
1 INJECTION, SOLUTION SUBCUTANEOUS
Qty: 3 ML | Refills: 11 | Status: SHIPPED | OUTPATIENT
Start: 2024-06-27 | End: 2025-06-27

## 2024-06-27 NOTE — ASSESSMENT & PLAN NOTE
He has not yet had a bone marrow biopsy  His last skeletal survey was May of last year  Will update  Forward results to Dr. Dawkins

## 2024-06-27 NOTE — ASSESSMENT & PLAN NOTE
Creatine has declined a little bit since his last visit; calcium is also up at 10.5 not sure if this is related to intravascular volume depletion or related to maybe his MGUS. Will let Dr Dawkins know. BMP reviewed- noted Estimated Creatinine Clearance: 50.5 mL/min (A) (based on SCr of 1.45 mg/dL (H)). according to latest data. Based on current GFR, CKD stage is stage 3 - GFR 30-59.   UPEP  Further recs to follow

## 2024-06-27 NOTE — PROGRESS NOTES
Internal Medicine    Patient ID: [id number]     Chief Complaint: Diabetes (3 month f/u)      HPI:     Galdino Joshi is a 78 y.o. male here today for a follow up.   In March he came for diabetic revisit creatinine at 0.97 he had lost 24 lb   He reports being in the doughnut hole with his insurance creatinine at 1.45 calcium 10.5 total protein 8.1 no recent H&H.  Followed by Dr. Sharp for elevated PSA last seen by him in January no recent progress note  Past Medical History:   Diagnosis Date    BPH (benign prostatic hyperplasia)     Essential (primary) hypertension     Mixed hyperlipidemia     Proteinuria     Type 2 diabetes mellitus without complication, without long-term current use of insulin         Past Surgical History:   Procedure Laterality Date    CARPAL TUNNEL RELEASE N/A     CARPAL TUNNEL RELEASE N/A     neuroplasty median nerve at carpal tunnel  Right     RELEASE OF ULNAR NERVE AT CUBITAL TUNNEL Right     SPINAL FUSION          Social History     Tobacco Use    Smoking status: Never    Smokeless tobacco: Current     Types: Chew   Substance and Sexual Activity    Alcohol use: Yes     Alcohol/week: 35.0 standard drinks of alcohol     Types: 35 Cans of beer per week    Drug use: Never    Sexual activity: Not Currently        Current Outpatient Medications   Medication Instructions    amLODIPine (NORVASC) 10 mg, Oral    atorvastatin (LIPITOR) 80 mg, Oral, Nightly    cholecalciferol (vitamin D3) 50,000 Units, Oral, Weekly    colchicine (COLCRYS) 0.6 mg tablet 1.2 mg p.o. x1 then 0.6 mg p.o. 1 hour later x1    finasteride (PROSCAR) 5 mg tablet TAKE 1 TABLET EVERY DAY    gabapentin (NEURONTIN) 300 mg, Oral, 3 times daily PRN    metFORMIN (GLUCOPHAGE) 1000 MG tablet TAKE 1 TABLET TWICE DAILY    metoprolol succinate (TOPROL-XL) 100 mg, Oral    olmesartan-hydrochlorothiazide (BENICAR HCT) 40-25 mg per tablet TAKE 1 TABLET EVERY DAY    OZEMPIC 1 mg, Subcutaneous, Every 7 days       Review of patient's allergies  "indicates:  No Known Allergies     Patient Care Team:  Francesco Salomon MD as PCP - General (Internal Medicine)  Fernandez Sharp MD as Consulting Physician (Urology)  Kristen Summers as Consulting Physician (Optometry)  Dwayne Adorno MD as Consulting Physician (Cardiology)  Preeti Dawkins MD as Consulting Physician (Hematology and Oncology)     Subjective:     Review of Systems    12 point review of systems conducted, negative except as stated in the history of present illness. See HPI for details.    Objective:     Visit Vitals  /82 (BP Location: Left arm, Patient Position: Sitting, BP Method: Medium (Manual))   Pulse 78   Temp 98 °F (36.7 °C) (Temporal)   Resp 16   Ht 5' 9" (1.753 m)   Wt 99.8 kg (220 lb)   SpO2 98%   BMI 32.49 kg/m²       Physical Exam  Constitutional:       Appearance: Normal appearance. He is obese.   HENT:      Head: Normocephalic and atraumatic.   Eyes:      Extraocular Movements: Extraocular movements intact.      Pupils: Pupils are equal, round, and reactive to light.   Cardiovascular:      Rate and Rhythm: Normal rate and regular rhythm.   Pulmonary:      Effort: Pulmonary effort is normal.      Breath sounds: Normal breath sounds.   Abdominal:      General: Bowel sounds are normal.      Palpations: Abdomen is soft.   Musculoskeletal:         General: Normal range of motion.   Skin:     General: Skin is warm and dry.   Neurological:      General: No focal deficit present.      Mental Status: He is alert.   Psychiatric:         Mood and Affect: Mood normal.         Labs Reviewed:     Chemistry:  Lab Results   Component Value Date     06/24/2024    K 4.0 06/24/2024    BUN 9.6 06/24/2024    CREATININE 1.45 (H) 06/24/2024    EGFRNORACEVR 50 06/24/2024    GLUCOSE 104 06/24/2024    CALCIUM 10.5 (H) 06/24/2024    ALKPHOS 110 06/24/2024    LABPROT 8.1 (H) 06/24/2024    ALBUMIN 4.1 06/24/2024    BILIDIR 0.2 05/03/2022    IBILI 0.30 05/03/2022    AST 18 06/24/2024    ALT " 18 06/24/2024    YDFXWZTT94DP 18.6 (L) 08/09/2023    TSH 5.566 (H) 08/09/2023    [id number] 1.02 08/09/2023    PSA 7.49 (H) 05/03/2022        Lab Results   Component Value Date    HGBA1C 6.0 06/24/2024        Hematology:  Lab Results   Component Value Date    WBC 6.88 05/08/2024    HGB 15.5 05/08/2024    HCT 47.0 05/08/2024     05/08/2024       Lipid Panel:  Lab Results   Component Value Date    CHOL 163 06/24/2024    HDL 54 06/24/2024    LDL 97.00 06/24/2024    TRIG 61 06/24/2024    TOTALCHOLEST 3 06/24/2024        Urine:  Lab Results   Component Value Date    APPEARANCEUA Clear 06/24/2024    SGUA 1.020 06/24/2024    PROTEINUA Negative 06/24/2024    KETONESUA Negative 06/24/2024    LEUKOCYTESUR Negative 06/24/2024    RBCUA None Seen 06/24/2024    WBCUA None Seen 06/24/2024    BACTERIA None Seen 06/24/2024    CREATRANDUR 195.1 (H) 06/24/2024        Assessment:       ICD-10-CM ICD-9-CM   1. Monoclonal gammopathy of unknown significance (MGUS)  D47.2 273.1   2. Type 2 diabetes mellitus with diabetic polyneuropathy, without long-term current use of insulin  E11.42 250.60     357.2   3. Chronic kidney disease, stage 3a  N18.31 585.3        Plan:     1. Monoclonal gammopathy of unknown significance (MGUS)  Overview:    SPEP: Serum protein electrophoresis shows two (2) monoclonal peaks. One is (0.06 g/dL) early in the gamma zone and the second is (0.45 g/dL) late in the gamma zone.    JAILYN: Two (2) bands are detected in the JAILYN gel.    One band is present in the IgG lawrence with a corresponding band in the lambda light chain lawrence. A second band is seen in the IgM lawrence with a corresponding band in the kappa light chain lawrence.    JAILYN pattern shows IgG type lambda and IgM type kappa biclonal gammopathy.    Comment: Findings correlate with patient's previous history of two clonal bands in JAILYN and SPEP.    Inderjit King M.D.       Assessment & Plan:  He has not yet had a bone marrow biopsy  His last skeletal survey was  May of last year  Will update  Forward results to Dr. Dawkins    Orders:  -     X-Ray Metastatic Survey Complete; Future; Expected date: 06/27/2024  -     Cancel: Ambulatory referral/consult to Nephrology; Future; Expected date: 07/04/2024  -     Protein electrophoresis, timed urine; Future    2. Type 2 diabetes mellitus with diabetic polyneuropathy, without long-term current use of insulin  Assessment & Plan:  Patient currently in the whole with his insurance I have given him some samples of Ozempic for the next 3 months 0.5 mg  His weight is down an additional 6 lb since his last visit.    Orders:  -     Cancel: Ambulatory referral/consult to Nephrology; Future; Expected date: 07/04/2024  -     Protein electrophoresis, timed urine; Future    3. Chronic kidney disease, stage 3a  Assessment & Plan:  Creatine has declined a little bit since his last visit; calcium is also up at 10.5 not sure if this is related to intravascular volume depletion or related to maybe his MGUS. Will let Dr Dawkins know. BMP reviewed- noted Estimated Creatinine Clearance: 50.5 mL/min (A) (based on SCr of 1.45 mg/dL (H)). according to latest data. Based on current GFR, CKD stage is stage 3 - GFR 30-59.   UPEP  Further recs to follow    Orders:  -     Cancel: Ambulatory referral/consult to Nephrology; Future; Expected date: 07/04/2024  -     Protein electrophoresis, timed urine; Future    Other orders  -     semaglutide (OZEMPIC) 1 mg/dose (4 mg/3 mL); Inject 1 mg into the skin every 7 days.  Dispense: 3 mL; Refill: 11         Follow up in about 3 months (around 9/27/2024) for DIABETIC REVISIT, with labs prior to visit. In addition to their scheduled follow up, the patient has also been instructed to follow up on as needed basis.     Future Appointments   Date Time Provider Department Center   10/1/2024  2:20 PM Francesco Salomon MD [facility] [id number]   11/14/2024 11:00 AM [facility]        Francesco  MD [facility]

## 2024-06-27 NOTE — ASSESSMENT & PLAN NOTE
Patient currently in the whole with his insurance I have given him some samples of Ozempic for the next 3 months 0.5 mg  His weight is down an additional 6 lb since his last visit.

## 2024-07-06 DIAGNOSIS — E11.59 HYPERTENSION ASSOCIATED WITH DIABETES: ICD-10-CM

## 2024-07-06 DIAGNOSIS — E78.5 HYPERLIPIDEMIA, UNSPECIFIED HYPERLIPIDEMIA TYPE: ICD-10-CM

## 2024-07-06 DIAGNOSIS — I15.2 HYPERTENSION ASSOCIATED WITH DIABETES: ICD-10-CM

## 2024-07-08 RX ORDER — FINASTERIDE 5 MG/1
TABLET, FILM COATED ORAL
Qty: 90 TABLET | Refills: 3 | Status: SHIPPED | OUTPATIENT
Start: 2024-07-08

## 2024-07-08 RX ORDER — OLMESARTAN MEDOXOMIL AND HYDROCHLOROTHIAZIDE 40/25 40; 25 MG/1; MG/1
TABLET ORAL
Qty: 90 TABLET | Refills: 3 | Status: SHIPPED | OUTPATIENT
Start: 2024-07-08 | End: 2025-07-08

## 2024-07-08 RX ORDER — METFORMIN HYDROCHLORIDE 1000 MG/1
TABLET ORAL
Qty: 180 TABLET | Refills: 3 | Status: SHIPPED | OUTPATIENT
Start: 2024-07-08

## 2024-09-17 DIAGNOSIS — E11.42 TYPE 2 DIABETES MELLITUS WITH DIABETIC POLYNEUROPATHY, WITHOUT LONG-TERM CURRENT USE OF INSULIN: Primary | ICD-10-CM

## 2024-09-18 ENCOUNTER — TELEPHONE (OUTPATIENT)
Dept: INTERNAL MEDICINE | Facility: CLINIC | Age: 79
End: 2024-09-18
Payer: MEDICARE

## 2024-09-18 NOTE — TELEPHONE ENCOUNTER
----- Message from Gregory Tanner MA sent at 9/18/2024  9:55 AM CDT -----  Regarding: PV 10/1/24 @ 2:20 Dr. Thacker  1. Are there any outstanding tasks in the patient's chart? Yes, fasting labs    2. Is there any documentation in the chart? No    3.Has patient been seen in an ER, Urgent care clinic, or been admitted since last visit?  If yes, When, where, and why    4. Has patient seen any other healthcare providers since last visit?  If yes, when, where, and why    5. Has patient had any bloodwork or XR done since last visit?    6. Is patient signed up for patient portal?

## 2024-09-25 ENCOUNTER — LAB VISIT (OUTPATIENT)
Dept: LAB | Facility: HOSPITAL | Age: 79
End: 2024-09-25
Attending: INTERNAL MEDICINE
Payer: MEDICARE

## 2024-09-25 DIAGNOSIS — E11.42 TYPE 2 DIABETES MELLITUS WITH DIABETIC POLYNEUROPATHY, WITHOUT LONG-TERM CURRENT USE OF INSULIN: ICD-10-CM

## 2024-09-25 LAB
ALBUMIN SERPL-MCNC: 4.1 G/DL (ref 3.4–4.8)
ALBUMIN/GLOB SERPL: 1 RATIO (ref 1.1–2)
ALP SERPL-CCNC: 114 UNIT/L (ref 40–150)
ALT SERPL-CCNC: 24 UNIT/L (ref 0–55)
ANION GAP SERPL CALC-SCNC: 10 MEQ/L
AST SERPL-CCNC: 23 UNIT/L (ref 5–34)
BACTERIA #/AREA URNS AUTO: ABNORMAL /HPF
BILIRUB SERPL-MCNC: 1.4 MG/DL
BILIRUB UR QL STRIP.AUTO: NEGATIVE
BUN SERPL-MCNC: 12.8 MG/DL (ref 8.4–25.7)
CALCIUM SERPL-MCNC: 10.5 MG/DL (ref 8.8–10)
CHLORIDE SERPL-SCNC: 103 MMOL/L (ref 98–107)
CHOLEST SERPL-MCNC: 184 MG/DL
CHOLEST/HDLC SERPL: 3 {RATIO} (ref 0–5)
CLARITY UR: CLEAR
CO2 SERPL-SCNC: 26 MMOL/L (ref 23–31)
COLOR UR AUTO: ABNORMAL
CREAT SERPL-MCNC: 1.04 MG/DL (ref 0.73–1.18)
CREAT UR-MCNC: 126.6 MG/DL (ref 63–166)
CREAT/UREA NIT SERPL: 12
EST. AVERAGE GLUCOSE BLD GHB EST-MCNC: 131.2 MG/DL
GFR SERPLBLD CREATININE-BSD FMLA CKD-EPI: >60 ML/MIN/1.73/M2
GLOBULIN SER-MCNC: 4 GM/DL (ref 2.4–3.5)
GLUCOSE SERPL-MCNC: 99 MG/DL (ref 82–115)
GLUCOSE UR QL STRIP: NEGATIVE
HBA1C MFR BLD: 6.2 %
HDLC SERPL-MCNC: 57 MG/DL (ref 35–60)
HGB UR QL STRIP: ABNORMAL
KETONES UR QL STRIP: NEGATIVE
LDLC SERPL CALC-MCNC: 108 MG/DL (ref 50–140)
LEUKOCYTE ESTERASE UR QL STRIP: NEGATIVE
MICROALBUMIN UR-MCNC: 10.3 UG/ML
MICROALBUMIN/CREAT RATIO PNL UR: 8.1 MG/GM CR (ref 0–30)
NITRITE UR QL STRIP: NEGATIVE
PH UR STRIP: 6 [PH]
POTASSIUM SERPL-SCNC: 3.8 MMOL/L (ref 3.5–5.1)
PROT SERPL-MCNC: 8.1 GM/DL (ref 5.8–7.6)
PROT UR QL STRIP: NEGATIVE
RBC #/AREA URNS AUTO: ABNORMAL /HPF
SODIUM SERPL-SCNC: 139 MMOL/L (ref 136–145)
SP GR UR STRIP.AUTO: 1.02 (ref 1–1.03)
SQUAMOUS #/AREA URNS AUTO: ABNORMAL /HPF
TRIGL SERPL-MCNC: 95 MG/DL (ref 34–140)
UROBILINOGEN UR STRIP-ACNC: 0.2
VLDLC SERPL CALC-MCNC: 19 MG/DL
WBC #/AREA URNS AUTO: ABNORMAL /HPF

## 2024-09-25 PROCEDURE — 36415 COLL VENOUS BLD VENIPUNCTURE: CPT

## 2024-09-25 PROCEDURE — 82570 ASSAY OF URINE CREATININE: CPT

## 2024-09-25 PROCEDURE — 81003 URINALYSIS AUTO W/O SCOPE: CPT

## 2024-09-25 PROCEDURE — 83036 HEMOGLOBIN GLYCOSYLATED A1C: CPT

## 2024-09-25 PROCEDURE — 80053 COMPREHEN METABOLIC PANEL: CPT

## 2024-09-25 PROCEDURE — 80061 LIPID PANEL: CPT

## 2024-09-25 NOTE — ADDENDUM NOTE
"[facility]  [address] [place] [address]    Subjective    Chief Complaint:   Follow-up    History of Present Illness:     Jadiel Rossi is a(n) 79 y.o. male with past medical history as noted below who presents to the clinic today for routine visit.    Feeling well today. Here to review recent labs.     Component      Latest Ref Rng 9/24/2024   Sodium      136 - 145 mmol/L 137    Potassium      3.5 - 5.1 mmol/L 4.3    Chloride      95 - 110 mmol/L 101    CO2      23 - 29 mmol/L 26    Glucose      70 - 110 mg/dL 107    BUN      8 - 23 mg/dL 34 (H)    Creatinine      0.5 - 1.4 mg/dL 2.0 (H)    Calcium      8.7 - 10.5 mg/dL 9.8    PROTEIN TOTAL      6.0 - 8.4 g/dL 6.9    Albumin      3.5 - 5.2 g/dL 3.4 (L)    BILIRUBIN TOTAL      0.1 - 1.0 mg/dL 0.9    ALP      55 - 135 U/L 67    AST      10 - 40 U/L 26    ALT      10 - 44 U/L 21    eGFR      >60 mL/min/1.73 m^2 33.7 !    Anion Gap      8 - 16 mmol/L 10    Hemoglobin A1C External      4.0 - 5.6 % 6.6 (H)    Estimated Avg Glucose      68 - 131 mg/dL 143 (H)    TSH      0.400 - 4.000 uIU/mL 2.622       Legend:  (H) High  (L) Low  ! Abnormal         History atrial fibrillation   Rx-Eliquis, Toprol XL 50   Cardiology- MD Ed   EP - MD Hector   "10% a fib" per Dr Tracy     Type 2 diabetes mellitus   Rx :  Jardiance  Prev Rx-metformin  Prev a1c : 5.9     Obstructive sleep apnea   CPAP intolerant   "Not candidate for Inspire" per SLENT      Dyslipidemia , bilateral carotid artery disease, CAD s/p PCI x2-3  Rx-atorvastatin 40 mg , Zetia  No exertional CP / SOB  Cardiology : MD Ed       Hypothyroidism   Rx-Synthroid 88 mcg   TSH nl      Essential hypertension   Rx- amlodipine 5 mg BID, Valsartan 160 mg BID, Metoprolol 50 mg , HCTZ 12.5 mg BID  Controlled in clinic and at home     Anxiety   No current medication     Chronic kidney disease, proteinuria  Baseline creatinine- 1.4-1.6     History iron deficiency " Addended by: JULIO C MARTINES on: 11/15/2022 03:42 PM     Modules accepted: Orders     anemia  Baseline HCT 30-33   Taking OTC supplements      Intermittent asthma   Rx-rescue inhaler    Problem List:   Patient Active Problem List   Diagnosis    ROBBY (obstructive sleep apnea)    Essential hypertension    Mixed hyperlipidemia    Type 2 diabetes mellitus with other specified complication, without long-term current use of insulin    Valvular heart disease    Carotid artery plaque    Methylenetetrahydrofolate reductase (MTHFR) gene mutation    S/P coronary artery stent placement    Atrial fibrillation with RVR    Mediastinal adenopathy    Iron deficiency anemia    Aortic atherosclerosis    Stage 3a chronic kidney disease    Stage 3b chronic kidney disease    Methylenetetrahydrofolate reductase deficiency       Current Outpatient Medications:   Current Outpatient Medications   Medication Instructions    albuterol (PROVENTIL/VENTOLIN HFA) 90 mcg/actuation inhaler INHALE 2 PUFFS INTO THE LUNGS EVERY 6 HOURS AS NEEDED FOR WHEEZING    amLODIPine (NORVASC) 5 MG tablet TAKE 1 TABLET(5 MG) BY MOUTH EVERY DAY    atorvastatin (LIPITOR) 40 mg, Oral    blood sugar diagnostic Strp To check BG once daily before a meal, to use with insurance preferred meter    ELIQUIS 5 mg, Oral, 2 times daily    empagliflozin (JARDIANCE) 10 mg, Oral, Daily    ezetimibe (ZETIA) 10 mg tablet TAKE ONE-HALF TABLET BY MOUTH DAILY    hydroCHLOROthiazide (HYDRODIURIL) 12.5 mg, Oral, 2 times daily    HYDROcodone-acetaminophen (NORCO) 5-325 mg per tablet 1 tablet, Oral, Every 6 hours PRN    lancets Misc To check BG once daily before a meal, to use with insurance preferred meter    levothyroxine (SYNTHROID) 88 MCG tablet TAKE 1 TABLET(88 MCG) BY MOUTH BEFORE BREAKFAST    metoprolol succinate (TOPROL-XL) 75 mg, Oral, Daily    metoprolol succinate (TOPROL-XL) 50 mg, Oral, Nightly    nitroGLYCERIN (NITROSTAT) 0.4 MG SL tablet DISSOLVE ONE T SUBLINGUALLY Q 5 MINUTES X 3 DOSES PRN FOR CHEST PAIN    valsartan (DIOVAN) 160 mg, Oral, 2 times daily     vitamin renal formula, B-complex-vitamin c-folic acid, (NEPHROCAP) 1 mg Cap 1 capsule, Oral, Daily       Surgical History:   Past Surgical History:   Procedure Laterality Date    APPENDECTOMY  [id number]    COLONOSCOPY N/A 2019    Procedure: COLONOSCOPY;  Surgeon: Jimenez Solomon Jr., MD;  Location: [facility] ENDO;  Service: Endoscopy;  Laterality: N/A;    CORONARY ANGIOPLASTY WITH STENT PLACEMENT      CORONARY STENT PLACEMENT  2017    LAD by Dr Ward    ESOPHAGOGASTRODUODENOSCOPY N/A 2019    Procedure: EGD (ESOPHAGOGASTRODUODENOSCOPY);  Surgeon: Jimenez Solomon Jr., MD;  Location: [facility] ENDO;  Service: Endoscopy;  Laterality: N/A;    INSERTION OF IMPLANTABLE LOOP RECORDER  2024    TRANSESOPHAGEAL ECHOCARDIOGRAM WITH POSSIBLE CARDIOVERSION (YNES W/ POSS CARDIOVERSION) N/A 2024    Procedure: TRANSESOPHAGEAL ECHOCARDIOGRAM WITH POSSIBLE CARDIOVERSION (YNES W/ POSS CARDIOVERSION);  Surgeon: Miguel Ward MD;  Location: [facility];  Service: Cardiology;  Laterality: N/A;       Family History:   Family History   Problem Relation Name Age of Onset    Cancer Mother      Heart disease Mother      Heart disease Father      Arthritis Father      Hypertension Father      Cancer Sister          breast cancer    Diabetes Sister      Asthma Brother      COPD Brother      Diabetes Brother      Stroke Brother      Cancer Sister          leukemia    Early death Sister           at 64 of leukemia    Colon cancer Neg Hx      Crohn's disease Neg Hx      Esophageal cancer Neg Hx      Ulcerative colitis Neg Hx      Stomach cancer Neg Hx         Allergies:   Review of patient's allergies indicates:   Allergen Reactions    Multaq [dronedarone]        Tobacco Status:   Tobacco Use: Low Risk  (2024)    Patient History     Smoking Tobacco Use: Never     Smokeless Tobacco Use: Never     Passive Exposure: Not on file       Sexual Activity:   Social History     Substance and Sexual Activity   Sexual Activity Not  "on file       Alcohol Use:   Social History     Substance and Sexual Activity   Alcohol Use Yes    Alcohol/week: 8.0 standard drinks of alcohol    Types: 8 Shots of liquor per week         Objective       Vitals:    09/25/24 1044   BP: 128/66   Pulse: 78   SpO2: 95%   Weight: 94.8 kg (209 lb 1.7 oz)   Height: 5' 10" (1.778 m)       Review of Systems   Constitutional:  Negative for chills and fever.   Respiratory:  Negative for cough and shortness of breath.    Cardiovascular:  Negative for chest pain.       Physical Exam  Constitutional:       General: He is not in acute distress.     Appearance: Normal appearance.   HENT:      Head: Normocephalic and atraumatic.   Cardiovascular:      Rate and Rhythm: Normal rate. Rhythm irregular.      Heart sounds: Murmur heard.      Systolic murmur is present with a grade of 3/6.   Pulmonary:      Effort: Pulmonary effort is normal. No respiratory distress.      Breath sounds: Normal breath sounds. No wheezing.   Skin:     General: Skin is warm.   Neurological:      Mental Status: He is alert and oriented to person, place, and time.   Psychiatric:         Behavior: Behavior normal.           Assessment and Plan:    1. Type 2 diabetes mellitus with other specified complication, without long-term current use of insulin    2. Stage 3b chronic kidney disease  -     BASIC METABOLIC PANEL; Future; Expected date: 09/25/2024    3. Mixed hyperlipidemia  -     Lipid Panel; Future; Expected date: 09/25/2024    4. Essential hypertension    5. Valvular heart disease    6. Paroxysmal atrial fibrillation        Visit summary:    Jadiel Rossi presented today for routine visit.    HTN, T2DM, and hypothyroidism controlled, continue current regimen.    A fib with normal rate today, maintain f/u with Dr. Ward.     Slight A1c increase with switch from Metformin --> Jardiance. Still controlled, continue current regimen     Lipid panel, urine Microalb/Cr ratio ordered    We discussed indication " for referral to nephrology. He states he hardly drinks any water. R/c BMP in 2 weeks, if still CKD 3b would consider referral to nephrology. May also need a few medications adjusted. Encouraged adequate hydration    He defers flu or covid vaccines    Patient was instructed to report to ER if symptoms become severe.    Follow up: PCP in 6 months      Mandy Sahu PA-C    This note was created partially with voice dictation software and is prone to errors. This note has been reviewed by me but some errors are inevitable.

## 2024-09-26 LAB
LEFT EYE DM RETINOPATHY: POSITIVE
RIGHT EYE DM RETINOPATHY: POSITIVE

## 2024-10-01 ENCOUNTER — OFFICE VISIT (OUTPATIENT)
Dept: INTERNAL MEDICINE | Facility: CLINIC | Age: 79
End: 2024-10-01
Payer: MEDICARE

## 2024-10-01 VITALS
RESPIRATION RATE: 16 BRPM | DIASTOLIC BLOOD PRESSURE: 82 MMHG | TEMPERATURE: 98 F | SYSTOLIC BLOOD PRESSURE: 146 MMHG | OXYGEN SATURATION: 98 % | BODY MASS INDEX: 33.03 KG/M2 | HEIGHT: 69 IN | WEIGHT: 223 LBS | HEART RATE: 75 BPM

## 2024-10-01 DIAGNOSIS — I10 WHITE COAT SYNDROME WITH DIAGNOSIS OF HYPERTENSION: ICD-10-CM

## 2024-10-01 DIAGNOSIS — E11.42 TYPE 2 DIABETES MELLITUS WITH DIABETIC POLYNEUROPATHY, WITHOUT LONG-TERM CURRENT USE OF INSULIN: Primary | ICD-10-CM

## 2024-10-01 RX ORDER — SEMAGLUTIDE 1.34 MG/ML
1 INJECTION, SOLUTION SUBCUTANEOUS
Qty: 3 ML | Refills: 11 | Status: SHIPPED | OUTPATIENT
Start: 2024-10-01 | End: 2025-10-01

## 2024-10-01 NOTE — ASSESSMENT & PLAN NOTE
Patient's FSGs are controlled on current medication regimen.  Last A1c reviewed-   Lab Results   Component Value Date    HGBA1C 6.2 09/25/2024   Currently [place] if his insurance will give him some samples of Ozempic 0.5 today will put him back on Mounjaro January 1st

## 2024-10-01 NOTE — ASSESSMENT & PLAN NOTE
Chronic, uncontrolled. Latest blood pressure and vitals reviewed-     [unfilled].   Home meds for hypertension were reviewed and noted below.   Hypertension Medications               amLODIPine (NORVASC) 10 MG tablet TAKE 1 TABLET ONE TIME DAILY    metoprolol succinate (TOPROL-XL) 100 MG 24 hr tablet TAKE 1 TABLET ONE TIME DAILY    olmesartan-hydrochlorothiazide (BENICAR HCT) 40-25 mg per tablet TAKE 1 TABLET EVERY DAY        Patient to call with update a blood pressure when he gets home

## 2024-10-01 NOTE — PROGRESS NOTES
Internal Medicine    Patient ID: [id number]     Chief Complaint: Diabetes (3 month f/u)      HPI:     Galdino Joshi is a 78 y.o. male here today for a follow up.   Diabetic revisit A1c at 6.2  Fasting sugar at 99  Blood pressure 146/82 on Benicar 40/25, Toprol 100, Norvasc 10  Only has 1 more shot of Ozempic and then can not afford it until January  Weight loss was superior on Mounjaro  Past Medical History:   Diagnosis Date    BPH (benign prostatic hyperplasia)     Essential (primary) hypertension     Mixed hyperlipidemia     Proteinuria     Type 2 diabetes mellitus without complication, without long-term current use of insulin         Past Surgical History:   Procedure Laterality Date    CARPAL TUNNEL RELEASE N/A     CARPAL TUNNEL RELEASE N/A     neuroplasty median nerve at carpal tunnel  Right     RELEASE OF ULNAR NERVE AT CUBITAL TUNNEL Right     SPINAL FUSION          Social History     Tobacco Use    Smoking status: Never    Smokeless tobacco: Current     Types: Chew   Substance and Sexual Activity    Alcohol use: Yes     Alcohol/week: 35.0 standard drinks of alcohol     Types: 35 Cans of beer per week    Drug use: Never    Sexual activity: Not Currently        Current Outpatient Medications   Medication Instructions    amLODIPine (NORVASC) 10 mg, Oral    atorvastatin (LIPITOR) 80 mg, Oral, Nightly    cholecalciferol (vitamin D3) 50,000 Units, Oral, Weekly    colchicine (COLCRYS) 0.6 mg tablet 1.2 mg p.o. x1 then 0.6 mg p.o. 1 hour later x1    finasteride (PROSCAR) 5 mg tablet TAKE 1 TABLET EVERY DAY    gabapentin (NEURONTIN) 300 mg, Oral, 3 times daily PRN    metFORMIN (GLUCOPHAGE) 1000 MG tablet TAKE 1 TABLET TWICE DAILY    metoprolol succinate (TOPROL-XL) 100 mg, Oral    olmesartan-hydrochlorothiazide (BENICAR HCT) 40-25 mg per tablet TAKE 1 TABLET EVERY DAY    OZEMPIC 1 mg, Subcutaneous, Every 7 days       Review of patient's allergies indicates:  No Known Allergies     Patient Care  "Team:  Francesco Salomon MD as PCP - General (Internal Medicine)  Fernandez Sharp MD as Consulting Physician (Urology)  Kristen Summers as Consulting Physician (Optometry)  Dwayne Adorno MD as Consulting Physician (Cardiology)  Preeti Dawkins MD as Consulting Physician (Hematology and Oncology)     Subjective:     Review of Systems    12 point review of systems conducted, negative except as stated in the history of present illness. See HPI for details.    Objective:     Visit Vitals  BP (!) 146/82 (BP Location: Left arm, Patient Position: Sitting)   Pulse 75   Temp 97.6 °F (36.4 °C) (Temporal)   Resp 16   Ht 5' 9" (1.753 m)   Wt 101.2 kg (223 lb)   SpO2 98%   BMI 32.93 kg/m²       Physical Exam  Constitutional:       Appearance: Normal appearance. He is obese.   HENT:      Head: Normocephalic and atraumatic.   Eyes:      Extraocular Movements: Extraocular movements intact.      Pupils: Pupils are equal, round, and reactive to light.   Pulmonary:      Effort: Pulmonary effort is normal.   Abdominal:      General: Bowel sounds are normal.      Palpations: Abdomen is soft.   Musculoskeletal:         General: Normal range of motion.   Skin:     General: Skin is warm and dry.   Neurological:      General: No focal deficit present.      Mental Status: He is alert.   Psychiatric:         Mood and Affect: Mood normal.         Labs Reviewed:     Chemistry:  Lab Results   Component Value Date     09/25/2024    K 3.8 09/25/2024    BUN 12.8 09/25/2024    CREATININE 1.04 09/25/2024    EGFRNORACEVR >60 09/25/2024    GLUCOSE 99 09/25/2024    CALCIUM 10.5 (H) 09/25/2024    ALKPHOS 114 09/25/2024    LABPROT 8.1 (H) 09/25/2024    ALBUMIN 4.1 09/25/2024    BILIDIR 0.2 05/03/2022    IBILI 0.30 05/03/2022    AST 23 09/25/2024    ALT 24 09/25/2024    QPBNGUMB52JJ 18.6 (L) 08/09/2023    TSH 5.566 (H) 08/09/2023    QXNHSU3PLEU 1.02 08/09/2023    PSA 7.49 (H) 05/03/2022        Lab Results   Component Value Date    " HGBA1C 6.2 09/25/2024        Hematology:  Lab Results   Component Value Date    WBC 6.88 05/08/2024    HGB 15.5 05/08/2024    HCT 47.0 05/08/2024     05/08/2024       Lipid Panel:  Lab Results   Component Value Date    CHOL 184 09/25/2024    HDL 57 09/25/2024    .00 09/25/2024    TRIG 95 09/25/2024    TOTALCHOLEST 3 09/25/2024        Urine:  Lab Results   Component Value Date    APPEARANCEUA Clear 09/25/2024    SGUA 1.020 09/25/2024    PROTEINUA Negative 09/25/2024    KETONESUA Negative 09/25/2024    LEUKOCYTESUR Negative 09/25/2024    RBCUA 0-2 09/25/2024    WBCUA None Seen 09/25/2024    BACTERIA None Seen 09/25/2024    CREATRANDUR 126.6 09/25/2024        Assessment:       ICD-10-CM ICD-9-CM   1. Type 2 diabetes mellitus with diabetic polyneuropathy, without long-term current use of insulin  E11.42 250.60     357.2   2. White coat syndrome with diagnosis of hypertension  I10 401.9        Plan:     1. Type 2 diabetes mellitus with diabetic polyneuropathy, without long-term current use of insulin  Assessment & Plan:  Patient's FSGs are controlled on current medication regimen.  Last A1c reviewed-   Lab Results   Component Value Date    HGBA1C 6.2 09/25/2024   [facility] insurance will give him some samples of Ozempic 0.5 today will put him back on Mounjaro January 1st      2. White coat syndrome with diagnosis of hypertension  Assessment & Plan:  Chronic, uncontrolled. Latest blood pressure and vitals reviewed-     [unfilled].   Home meds for hypertension were reviewed and noted below.   Hypertension Medications               amLODIPine (NORVASC) 10 MG tablet TAKE 1 TABLET ONE TIME DAILY    metoprolol succinate (TOPROL-XL) 100 MG 24 hr tablet TAKE 1 TABLET ONE TIME DAILY    olmesartan-hydrochlorothiazide (BENICAR HCT) 40-25 mg per tablet TAKE 1 TABLET EVERY DAY        Patient to call with update a blood pressure when he gets home      Other orders  -     semaglutide  (OZEMPIC) 1 mg/dose (4 mg/3 mL); Inject 1 mg into the skin every 7 days.  Dispense: 3 mL; Refill: 11         Follow up in about 3 months (around 1/1/2025) for DIABETIC REVISIT, with labs prior to visit. In addition to their scheduled follow up, the patient has also been instructed to follow up on as needed basis.     Future Appointments   Date Time Provider Department Center   11/14/2024 11:00 AM [facility]   1/7/2025  1:20 PM Francesco Salomon MD [facility] [address] [id number]        Francesco Salomon MD  An office visit for an established patient was performed. 10 minutes was used for reviewing the patients chart prior to the inoffice visit done on that same day. 15 minutes was used during the visit in regards to taking the patient history and physical exam. There was also an additional 5 minutes spent on education and counseling regarding medical conditions, current medications including risk/benefit and side effects/adverse events, vaccine counseling. After leaving the exam room, the provider then spent an additional 5 minutes completing the electronic health record.    The patient is receptive, expresses understanding and is agreeable to plan. All questions answered; total time spent was 35 minutes.

## 2024-11-21 ENCOUNTER — HOSPITAL ENCOUNTER (EMERGENCY)
Facility: HOSPITAL | Age: 79
Discharge: HOME | End: 2024-11-21
Attending: FAMILY MEDICINE
Payer: MEDICARE

## 2024-11-21 VITALS
WEIGHT: 234 LBS | HEIGHT: 69 IN | TEMPERATURE: 97 F | BODY MASS INDEX: 34.66 KG/M2 | OXYGEN SATURATION: 100 % | SYSTOLIC BLOOD PRESSURE: 170 MMHG | DIASTOLIC BLOOD PRESSURE: 80 MMHG | RESPIRATION RATE: 18 BRPM | HEART RATE: 70 BPM

## 2024-11-21 DIAGNOSIS — M25.511 ACUTE PAIN OF RIGHT SHOULDER: Primary | ICD-10-CM

## 2024-11-21 DIAGNOSIS — M75.01 ADHESIVE CAPSULITIS OF RIGHT SHOULDER: ICD-10-CM

## 2024-11-21 PROCEDURE — 99284 EMERGENCY DEPT VISIT MOD MDM: CPT | Mod: 25

## 2024-11-21 PROCEDURE — 63600175 PHARM REV CODE 636 W HCPCS: Performed by: FAMILY MEDICINE

## 2024-11-21 PROCEDURE — 96372 THER/PROPH/DIAG INJ SC/IM: CPT | Performed by: FAMILY MEDICINE

## 2024-11-21 RX ORDER — PREDNISONE 20 MG/1
20 TABLET ORAL DAILY
Qty: 5 TABLET | Refills: 0 | Status: SHIPPED | OUTPATIENT
Start: 2024-11-21 | End: 2024-11-26

## 2024-11-21 RX ORDER — DEXAMETHASONE SODIUM PHOSPHATE 4 MG/ML
4 INJECTION, SOLUTION INTRA-ARTICULAR; INTRALESIONAL; INTRAMUSCULAR; INTRAVENOUS; SOFT TISSUE
Status: COMPLETED | OUTPATIENT
Start: 2024-11-21 | End: 2024-11-21

## 2024-11-21 RX ORDER — KETOROLAC TROMETHAMINE 30 MG/ML
30 INJECTION, SOLUTION INTRAMUSCULAR; INTRAVENOUS
Status: COMPLETED | OUTPATIENT
Start: 2024-11-21 | End: 2024-11-21

## 2024-11-21 RX ORDER — KETOROLAC TROMETHAMINE 10 MG/1
10 TABLET, FILM COATED ORAL EVERY 6 HOURS
Qty: 15 TABLET | Refills: 0 | Status: SHIPPED | OUTPATIENT
Start: 2024-11-21 | End: 2024-11-26

## 2024-11-21 RX ADMIN — DEXAMETHASONE SODIUM PHOSPHATE 4 MG: 4 INJECTION, SOLUTION INTRA-ARTICULAR; INTRALESIONAL; INTRAMUSCULAR; INTRAVENOUS; SOFT TISSUE at 01:11

## 2024-11-21 RX ADMIN — KETOROLAC TROMETHAMINE 30 MG: 30 INJECTION, SOLUTION INTRAMUSCULAR at 01:11

## 2024-11-21 NOTE — ED PROVIDER NOTES
Encounter Date: 11/21/2024       History     Chief Complaint   Patient presents with    Shoulder Pain     Pt c/o R shoulder pain x1 month & unable to raise arm; denies fall or any injury to shoulder.     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 started ropes a lot started hurting about a month ago and it just progressively gotten worse where he has a very decreased range of motion upon examination he does have decreased range of motion no obvious deformities at this point I do not feel x-rays going to do a lot of good as far as treatment wise it did not feel there is any fractures does not appear dislocated I feel patient needs physical therapy if it continues to hurt maybe an MRI instructed patient is a couple of range-of-motion exercises we will give him some anti-inflammatories recommend he sees his primary care doctor he really needs physical therapy to work on in his frozen shoulder patient understands and agrees with the plan        Review of patient's allergies indicates:  No Known Allergies  Past Medical History:   Diagnosis Date    BPH (benign prostatic hyperplasia)     Essential (primary) hypertension     Mixed hyperlipidemia     Proteinuria     Type 2 diabetes mellitus without complication, without long-term current use of insulin      Past Surgical History:   Procedure Laterality Date    CARPAL TUNNEL RELEASE N/A     CARPAL TUNNEL RELEASE N/A     neuroplasty median nerve at carpal tunnel  Right     RELEASE OF ULNAR NERVE AT CUBITAL TUNNEL Right     SPINAL FUSION       Family History   Problem Relation Name Age of Onset    Heart disease Mother       Social History     Tobacco Use    Smoking status: Never    Smokeless tobacco: Current     Types: Chew   Substance Use Topics    Alcohol use: Yes     Alcohol/week: 35.0 standard drinks of alcohol     Types: 35 Cans of beer per week     Drug use: Never     Review of Systems   Musculoskeletal:         Right shoulder pain   All other systems reviewed and are negative.      Physical Exam     Initial Vitals [11/21/24 1238]   BP Pulse Resp Temp SpO2   (!) 170/80 70 18 97.3 °F (36.3 °C) 100 %      MAP       --         Physical Exam    Nursing note and vitals reviewed.  Constitutional: He appears well-developed and well-nourished. He is active.   HENT:   Head: Normocephalic and atraumatic.   Eyes: Conjunctivae, EOM and lids are normal. Pupils are equal, round, and reactive to light.   Neck: Trachea normal and phonation normal. Neck supple. No thyroid mass present.   Normal range of motion.  Cardiovascular:  Normal rate, regular rhythm, normal heart sounds and normal pulses.           Pulmonary/Chest: Breath sounds normal.   Abdominal: Abdomen is soft. Bowel sounds are normal.   Musculoskeletal:         General: Tenderness present.      Cervical back: Normal range of motion and neck supple.      Comments: Decreased range of motion right shoulder     Neurological: He is alert.   Skin: Skin is warm and intact.   Psychiatric: He has a normal mood and affect. His speech is normal and behavior is normal. Judgment and thought content normal. Cognition and memory are normal.         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 started ropes a lot started hurting about a month ago and it just progressively gotten worse where he has a very decreased range of motion upon examination he does have decreased range of motion no obvious deformities at this point I do not feel x-rays  going to do a lot of good as far as treatment wise it did not feel there is any fractures does not appear dislocated I feel patient needs physical therapy if it continues to hurt maybe an MRI instructed patient is a couple of range-of-motion exercises we will give him some anti-inflammatories recommend he sees his primary care doctor he really needs physical therapy to work on in his frozen shoulder patient understands and agrees with the plan          Risk  Prescription drug management.  Risk Details: Differential diagnosis shoulder sprain showed a fracture tendinitis bursitis tear of the rotator cuff frozen shoulder                                      Clinical Impression:  Final diagnoses:  [M25.511] Acute pain of right shoulder (Primary)  [M75.01] Adhesive capsulitis of right shoulder          ED Disposition Condition    Discharge Stable          ED Prescriptions       Medication Sig Dispense Start Date End Date Auth. Provider    predniSONE (DELTASONE) 20 MG tablet Take 1 tablet (20 mg total) by mouth once daily. for 5 days 5 tablet 11/21/2024 11/26/2024 Sabino Starkey MD    ketorolac (TORADOL) 10 mg tablet Take 1 tablet (10 mg total) by mouth every 6 (six) hours. for 5 days 15 tablet 11/21/2024 11/26/2024 Sabino Starkey MD          Follow-up Information       Follow up With Specialties Details Why Contact Info    Francesco Salomon MD Internal Medicine In 3 days  [address] [place] [id number]  [phone]               Sabino Starkey MD  11/21/24 [id number]

## 2024-11-21 NOTE — DISCHARGE INSTRUCTIONS
Recommend take meds as prescribed recommend do exercises at home recommend get your primary care doctor to get a physical therapy consult for you to work on frozen shoulder

## 2024-11-22 ENCOUNTER — PATIENT OUTREACH (OUTPATIENT)
Dept: EMERGENCY MEDICINE | Facility: HOSPITAL | Age: 79
End: 2024-11-22
Payer: MEDICARE

## 2024-12-09 DIAGNOSIS — E11.59 HYPERTENSION ASSOCIATED WITH DIABETES: ICD-10-CM

## 2024-12-09 DIAGNOSIS — I15.2 HYPERTENSION ASSOCIATED WITH DIABETES: ICD-10-CM

## 2024-12-09 RX ORDER — AMLODIPINE BESYLATE 10 MG/1
10 TABLET ORAL
Qty: 90 TABLET | Refills: 3 | Status: SHIPPED | OUTPATIENT
Start: 2024-12-09

## 2024-12-24 ENCOUNTER — TELEPHONE (OUTPATIENT)
Dept: INTERNAL MEDICINE | Facility: CLINIC | Age: 79
End: 2024-12-24
Payer: MEDICARE

## 2024-12-24 DIAGNOSIS — E11.42 TYPE 2 DIABETES MELLITUS WITH DIABETIC POLYNEUROPATHY, WITHOUT LONG-TERM CURRENT USE OF INSULIN: Primary | ICD-10-CM

## 2024-12-24 DIAGNOSIS — E55.9 VITAMIN D DEFICIENCY: ICD-10-CM

## 2024-12-24 DIAGNOSIS — I15.2 HYPERTENSION ASSOCIATED WITH DIABETES: ICD-10-CM

## 2024-12-24 DIAGNOSIS — E78.2 MIXED HYPERLIPIDEMIA: ICD-10-CM

## 2024-12-24 DIAGNOSIS — E11.59 HYPERTENSION ASSOCIATED WITH DIABETES: ICD-10-CM

## 2024-12-24 NOTE — TELEPHONE ENCOUNTER
----- Message from Med Assistant Jenkins sent at 12/24/2024  8:18 AM CST -----  Regarding: PV 1/7/25 @ 1:20 Dr. Thacker  1. Are there any outstanding tasks in the patient's chart? Yes, fasting labs    2. Is there any documentation in the chart? No    3.Has patient been seen in an ER, Urgent care clinic, or been admitted since last visit?  If yes, When, where, and why    4. Has patient seen any other healthcare providers since last visit?  If yes, when, where, and why    5. Has patient had any bloodwork or XR done since last visit?    6. Is patient signed up for patient portal?    7. Does patient have home blood pressure cuff?   If yes, please have patient bring to appointment for validation.

## 2025-01-03 ENCOUNTER — LAB VISIT (OUTPATIENT)
Dept: LAB | Facility: HOSPITAL | Age: 80
End: 2025-01-03
Attending: INTERNAL MEDICINE
Payer: MEDICARE

## 2025-01-03 DIAGNOSIS — E55.9 VITAMIN D DEFICIENCY: ICD-10-CM

## 2025-01-03 DIAGNOSIS — I15.2 HYPERTENSION ASSOCIATED WITH DIABETES: ICD-10-CM

## 2025-01-03 DIAGNOSIS — E11.59 HYPERTENSION ASSOCIATED WITH DIABETES: ICD-10-CM

## 2025-01-03 DIAGNOSIS — E11.42 TYPE 2 DIABETES MELLITUS WITH DIABETIC POLYNEUROPATHY, WITHOUT LONG-TERM CURRENT USE OF INSULIN: ICD-10-CM

## 2025-01-03 DIAGNOSIS — E78.2 MIXED HYPERLIPIDEMIA: ICD-10-CM

## 2025-01-03 LAB
25(OH)D3+25(OH)D2 SERPL-MCNC: 15 NG/ML (ref 30–80)
ALBUMIN SERPL-MCNC: 3.8 G/DL (ref 3.4–4.8)
ALBUMIN/GLOB SERPL: 1 RATIO (ref 1.1–2)
ALP SERPL-CCNC: 117 UNIT/L (ref 40–150)
ALT SERPL-CCNC: 22 UNIT/L (ref 0–55)
ANION GAP SERPL CALC-SCNC: 8 MEQ/L
AST SERPL-CCNC: 23 UNIT/L (ref 5–34)
BACTERIA #/AREA URNS AUTO: NORMAL /HPF
BASOPHILS # BLD AUTO: 0.02 X10(3)/MCL
BASOPHILS NFR BLD AUTO: 0.3 %
BILIRUB SERPL-MCNC: 0.6 MG/DL
BILIRUB UR QL STRIP.AUTO: NEGATIVE
BUN SERPL-MCNC: 8.3 MG/DL (ref 8.4–25.7)
CALCIUM SERPL-MCNC: 9.5 MG/DL (ref 8.8–10)
CHLORIDE SERPL-SCNC: 109 MMOL/L (ref 98–107)
CHOLEST SERPL-MCNC: 177 MG/DL
CHOLEST/HDLC SERPL: 3 {RATIO} (ref 0–5)
CLARITY UR: CLEAR
CO2 SERPL-SCNC: 26 MMOL/L (ref 23–31)
COLOR UR AUTO: NORMAL
CREAT SERPL-MCNC: 1.01 MG/DL (ref 0.72–1.25)
CREAT UR-MCNC: 117 MG/DL (ref 63–166)
CREAT/UREA NIT SERPL: 8
EOSINOPHIL # BLD AUTO: 0.15 X10(3)/MCL (ref 0–0.9)
EOSINOPHIL NFR BLD AUTO: 2.4 %
ERYTHROCYTE [DISTWIDTH] IN BLOOD BY AUTOMATED COUNT: 13.5 % (ref 11.5–17)
EST. AVERAGE GLUCOSE BLD GHB EST-MCNC: 145.6 MG/DL
GFR SERPLBLD CREATININE-BSD FMLA CKD-EPI: >60 ML/MIN/1.73/M2
GLOBULIN SER-MCNC: 3.8 GM/DL (ref 2.4–3.5)
GLUCOSE SERPL-MCNC: 128 MG/DL (ref 82–115)
GLUCOSE UR QL STRIP: NEGATIVE
HBA1C MFR BLD: 6.7 %
HCT VFR BLD AUTO: 45.1 % (ref 42–52)
HDLC SERPL-MCNC: 58 MG/DL (ref 35–60)
HGB BLD-MCNC: 14.6 G/DL (ref 14–18)
HGB UR QL STRIP: NEGATIVE
IMM GRANULOCYTES # BLD AUTO: 0.01 X10(3)/MCL (ref 0–0.04)
IMM GRANULOCYTES NFR BLD AUTO: 0.2 %
KETONES UR QL STRIP: NEGATIVE
LDLC SERPL CALC-MCNC: 96 MG/DL (ref 50–140)
LEUKOCYTE ESTERASE UR QL STRIP: NEGATIVE
LYMPHOCYTES # BLD AUTO: 2.9 X10(3)/MCL (ref 0.6–4.6)
LYMPHOCYTES NFR BLD AUTO: 45.6 %
MCH RBC QN AUTO: 30.7 PG (ref 27–31)
MCHC RBC AUTO-ENTMCNC: 32.4 G/DL (ref 33–36)
MCV RBC AUTO: 94.7 FL (ref 80–94)
MICROALBUMIN UR-MCNC: 8.3 UG/ML
MICROALBUMIN/CREAT RATIO PNL UR: 7.1 MG/GM CR (ref 0–30)
MONOCYTES # BLD AUTO: 0.78 X10(3)/MCL (ref 0.1–1.3)
MONOCYTES NFR BLD AUTO: 12.3 %
NEUTROPHILS # BLD AUTO: 2.5 X10(3)/MCL (ref 2.1–9.2)
NEUTROPHILS NFR BLD AUTO: 39.2 %
NITRITE UR QL STRIP: NEGATIVE
PH UR STRIP: 6 [PH]
PLATELET # BLD AUTO: 226 X10(3)/MCL (ref 130–400)
PMV BLD AUTO: 8.5 FL (ref 7.4–10.4)
POTASSIUM SERPL-SCNC: 3.6 MMOL/L (ref 3.5–5.1)
PROT SERPL-MCNC: 7.6 GM/DL (ref 5.8–7.6)
PROT UR QL STRIP: NEGATIVE
RBC # BLD AUTO: 4.76 X10(6)/MCL (ref 4.7–6.1)
RBC #/AREA URNS AUTO: NORMAL /HPF
SODIUM SERPL-SCNC: 143 MMOL/L (ref 136–145)
SP GR UR STRIP.AUTO: 1.02 (ref 1–1.03)
SQUAMOUS #/AREA URNS AUTO: NORMAL /HPF
TRIGL SERPL-MCNC: 114 MG/DL (ref 34–140)
TSH SERPL-ACNC: 3.79 UIU/ML (ref 0.35–4.94)
UROBILINOGEN UR STRIP-ACNC: 0.2
VLDLC SERPL CALC-MCNC: 23 MG/DL
WBC # BLD AUTO: 6.36 X10(3)/MCL (ref 4.5–11.5)
WBC #/AREA URNS AUTO: NORMAL /HPF

## 2025-01-03 PROCEDURE — 82306 VITAMIN D 25 HYDROXY: CPT

## 2025-01-03 PROCEDURE — 81003 URINALYSIS AUTO W/O SCOPE: CPT

## 2025-01-03 PROCEDURE — 36415 COLL VENOUS BLD VENIPUNCTURE: CPT

## 2025-01-03 PROCEDURE — 84443 ASSAY THYROID STIM HORMONE: CPT

## 2025-01-03 PROCEDURE — 83036 HEMOGLOBIN GLYCOSYLATED A1C: CPT

## 2025-01-03 PROCEDURE — 80053 COMPREHEN METABOLIC PANEL: CPT

## 2025-01-03 PROCEDURE — 80061 LIPID PANEL: CPT

## 2025-01-03 PROCEDURE — 85025 COMPLETE CBC W/AUTO DIFF WBC: CPT

## 2025-01-03 PROCEDURE — 82570 ASSAY OF URINE CREATININE: CPT

## 2025-01-07 ENCOUNTER — OFFICE VISIT (OUTPATIENT)
Dept: INTERNAL MEDICINE | Facility: CLINIC | Age: 80
End: 2025-01-07
Payer: MEDICARE

## 2025-01-07 VITALS
DIASTOLIC BLOOD PRESSURE: 76 MMHG | BODY MASS INDEX: 34.8 KG/M2 | WEIGHT: 235 LBS | TEMPERATURE: 98 F | SYSTOLIC BLOOD PRESSURE: 148 MMHG | OXYGEN SATURATION: 99 % | HEIGHT: 69 IN | RESPIRATION RATE: 16 BRPM | HEART RATE: 72 BPM

## 2025-01-07 DIAGNOSIS — E11.42 TYPE 2 DIABETES MELLITUS WITH DIABETIC POLYNEUROPATHY, WITHOUT LONG-TERM CURRENT USE OF INSULIN: ICD-10-CM

## 2025-01-07 DIAGNOSIS — M75.21 BICEPS TENDINITIS OF RIGHT SHOULDER: Primary | ICD-10-CM

## 2025-01-07 PROCEDURE — 3077F SYST BP >= 140 MM HG: CPT | Mod: CPTII,,, | Performed by: INTERNAL MEDICINE

## 2025-01-07 PROCEDURE — 1160F RVW MEDS BY RX/DR IN RCRD: CPT | Mod: CPTII,,, | Performed by: INTERNAL MEDICINE

## 2025-01-07 PROCEDURE — 1125F AMNT PAIN NOTED PAIN PRSNT: CPT | Mod: CPTII,,, | Performed by: INTERNAL MEDICINE

## 2025-01-07 PROCEDURE — 99214 OFFICE O/P EST MOD 30 MIN: CPT | Mod: ,,, | Performed by: INTERNAL MEDICINE

## 2025-01-07 PROCEDURE — 1159F MED LIST DOCD IN RCRD: CPT | Mod: CPTII,,, | Performed by: INTERNAL MEDICINE

## 2025-01-07 PROCEDURE — 3288F FALL RISK ASSESSMENT DOCD: CPT | Mod: CPTII,,, | Performed by: INTERNAL MEDICINE

## 2025-01-07 PROCEDURE — 1101F PT FALLS ASSESS-DOCD LE1/YR: CPT | Mod: CPTII,,, | Performed by: INTERNAL MEDICINE

## 2025-01-07 PROCEDURE — 3078F DIAST BP <80 MM HG: CPT | Mod: CPTII,,, | Performed by: INTERNAL MEDICINE

## 2025-01-07 RX ORDER — DUTASTERIDE 0.5 MG/1
0.5 CAPSULE, LIQUID FILLED ORAL DAILY
COMMUNITY
Start: 2024-12-19

## 2025-01-07 RX ORDER — TIRZEPATIDE 2.5 MG/.5ML
2.5 INJECTION, SOLUTION SUBCUTANEOUS
Qty: 2 ML | Refills: 0 | Status: SHIPPED | OUTPATIENT
Start: 2025-01-07

## 2025-01-07 RX ORDER — DICLOFENAC SODIUM 30 MG/G
GEL TOPICAL 2 TIMES DAILY
Qty: 100 G | Refills: 0 | Status: SHIPPED | OUTPATIENT
Start: 2025-01-07

## 2025-01-07 NOTE — PROGRESS NOTES
Internal Medicine    Patient ID: [id number]     Chief Complaint: Medicare AWV Follow Up      HPI:     Galdino Joshi is a 79 y.o. male here today for a follow up.   Diabetic revisit A1c at 6.7 which is increased from 6.2 we sampled him some Ozempic at the last visit because he was in the doughnut hole would like to go back on his Mounjaro.    Has tendinitis on the right shoulder  Range of motion is better; hurting at night time  Doing topical capsaicin with little improvement  Past Medical History:   Diagnosis Date    BPH (benign prostatic hyperplasia)     Essential (primary) hypertension     Mixed hyperlipidemia     Proteinuria     Type 2 diabetes mellitus without complication, without long-term current use of insulin         Past Surgical History:   Procedure Laterality Date    CARPAL TUNNEL RELEASE N/A     CARPAL TUNNEL RELEASE N/A     neuroplasty median nerve at carpal tunnel  Right     RELEASE OF ULNAR NERVE AT CUBITAL TUNNEL Right     SPINAL FUSION          Social History     Tobacco Use    Smoking status: Never    Smokeless tobacco: Current     Types: Chew   Substance and Sexual Activity    Alcohol use: Yes     Alcohol/week: 35.0 standard drinks of alcohol     Types: 35 Cans of beer per week    Drug use: Never    Sexual activity: Not Currently        Current Outpatient Medications   Medication Instructions    amLODIPine (NORVASC) 10 mg, Oral    atorvastatin (LIPITOR) 80 mg, Oral, Nightly    cholecalciferol (vitamin D3) 50,000 Units, Oral, Weekly    colchicine (COLCRYS) 0.6 mg tablet 1.2 mg p.o. x1 then 0.6 mg p.o. 1 hour later x1    diclofenac sodium (SOLARAZE) 3 % gel Topical (Top), 2 times daily, 2gm    dutasteride (AVODART) 0.5 mg, Daily    gabapentin (NEURONTIN) 300 mg, Oral, 3 times daily PRN    metFORMIN (GLUCOPHAGE) 1000 MG tablet TAKE 1 TABLET TWICE DAILY    metoprolol succinate (TOPROL-XL) 100 mg, Oral    MOUNJARO 2.5 mg, Subcutaneous, Every 7 days, Call MD to increase dose in 4 weeks     "olmesartan-hydrochlorothiazide (BENICAR HCT) 40-25 mg per tablet TAKE 1 TABLET EVERY DAY       Review of patient's allergies indicates:  No Known Allergies     Patient Care Team:  Francesco Salomon MD as PCP - General (Internal Medicine)  Fernandez Sharp MD as Consulting Physician (Urology)  Kristen Summers as Consulting Physician (Optometry)  Dwayne Adorno MD as Consulting Physician (Cardiology)  Preeti Dawkins MD as Consulting Physician (Hematology and Oncology)     Subjective:     Review of Systems    12 point review of systems conducted, negative except as stated in the history of present illness. See HPI for details.    Objective:     Visit Vitals  BP (!) 148/76 (BP Location: Left arm, Patient Position: Sitting)   Pulse 72   Temp 97.8 °F (36.6 °C) (Temporal)   Resp 16   Ht 5' 9" (1.753 m)   Wt 106.6 kg (235 lb)   SpO2 99%   BMI 34.70 kg/m²       Physical Exam  Constitutional:       Appearance: Normal appearance. He is obese.   HENT:      Head: Normocephalic and atraumatic.   Eyes:      Extraocular Movements: Extraocular movements intact.      Pupils: Pupils are equal, round, and reactive to light.   Cardiovascular:      Rate and Rhythm: Normal rate and regular rhythm.   Pulmonary:      Effort: Pulmonary effort is normal.      Breath sounds: Normal breath sounds.   Abdominal:      General: Bowel sounds are normal.      Palpations: Abdomen is soft.   Musculoskeletal:         General: No swelling, tenderness or deformity. Normal range of motion.   Skin:     General: Skin is warm and dry.   Neurological:      General: No focal deficit present.      Mental Status: He is alert.   Psychiatric:         Mood and Affect: Mood normal.         Labs Reviewed:     Chemistry:  Lab Results   Component Value Date     01/03/2025    K 3.6 01/03/2025    BUN 8.3 (L) 01/03/2025    CREATININE 1.01 01/03/2025    EGFRNORACEVR >60 01/03/2025    GLUCOSE 128 (H) 01/03/2025    CALCIUM 9.5 01/03/2025    ALKPHOS 117 " 01/03/2025    LABPROT 7.6 01/03/2025    ALBUMIN 3.8 01/03/2025    BILIDIR 0.2 05/03/2022    IBILI 0.30 05/03/2022    AST 23 01/03/2025    ALT 22 01/03/2025    [id number] 15 (L) 01/03/2025    TSH 3.788 01/03/2025    OONVFE7PEBR 1.02 08/09/2023    PSA 7.49 (H) 05/03/2022        Lab Results   Component Value Date    HGBA1C 6.7 01/03/2025        Hematology:  Lab Results   Component Value Date    WBC 6.36 01/03/2025    HGB 14.6 01/03/2025    HCT 45.1 01/03/2025     01/03/2025       Lipid Panel:  Lab Results   Component Value Date    CHOL 177 01/03/2025    HDL 58 01/03/2025    LDL 96.00 01/03/2025    TRIG 114 01/03/2025    TOTALCHOLEST 3 01/03/2025        Urine:  Lab Results   Component Value Date    APPEARANCEUA Clear 01/03/2025    SGUA 1.020 01/03/2025    PROTEINUA Negative 01/03/2025    KETONESUA Negative 01/03/2025    LEUKOCYTESUR Negative 01/03/2025    RBCUA None Seen 01/03/2025    WBCUA None Seen 01/03/2025    BACTERIA None Seen 01/03/2025    CREATRANDUR 117.0 01/03/2025        Assessment:       ICD-10-CM ICD-9-CM   1. Biceps tendinitis of right shoulder  M75.21 726.12   2. Type 2 diabetes mellitus with diabetic polyneuropathy, without long-term current use of insulin  E11.42 250.60     357.2        Plan:     1. Biceps tendinitis of right shoulder  Assessment & Plan:    Ongoing conservative management, range of motion improving.  Okay for topical diclofenac Rx sent to pharmacy.  Patient established with Dr. Fallon in the event that he is still not better in the next few weeks but overall improving, exercises given today      2. Type 2 diabetes mellitus with diabetic polyneuropathy, without long-term current use of insulin  Assessment & Plan:  Discontinue Ozempic, Rx Mounjaro sent to pharmacy  RTC 3 months for diabetic revisit        Other orders  -     diclofenac sodium (SOLARAZE) 3 % gel; Apply topically 2 (two) times a day. 2gm  Dispense: 100 g; Refill: 0  -     tirzepatide (MOUNJARO) 2.5 mg/0.5 mL PnIj;  Inject 2.5 mg into the skin every 7 days. Call MD to increase dose in 4 weeks  Dispense: 2 mL; Refill: 0         Follow up in about 3 months (around 4/7/2025) for DIABETIC REVISIT. In addition to their scheduled follow up, the patient has also been instructed to follow up on as needed basis.     Future Appointments   Date Time Provider Department Center   1/14/2025  3:00 PM [facility]   4/7/2025 10:00 AM Francesco Salomon MD [facility] [address]        Francesco Salomon MD

## 2025-01-07 NOTE — ASSESSMENT & PLAN NOTE
Ongoing conservative management, range of motion improving.  Okay for topical diclofenac Rx sent to pharmacy.  Patient established with Dr. Fallon in the event that he is still not better in the next few weeks but overall improving, exercises given today

## 2025-01-09 ENCOUNTER — TELEPHONE (OUTPATIENT)
Dept: INTERNAL MEDICINE | Facility: CLINIC | Age: 80
End: 2025-01-09
Payer: MEDICARE

## 2025-01-09 NOTE — TELEPHONE ENCOUNTER
PA denied for Diclofenac 3 % gel. [facility] compounded cream per Dr. Thacker.

## 2025-01-10 NOTE — TELEPHONE ENCOUNTER
Pt informed         Elvira Hernández Staff  Caller: Galdino (Today,  9:57 AM)  Pt requesting a call back from oliver

## 2025-01-16 ENCOUNTER — TELEPHONE (OUTPATIENT)
Dept: INTERNAL MEDICINE | Facility: CLINIC | Age: 80
End: 2025-01-16
Payer: MEDICARE

## 2025-01-16 DIAGNOSIS — E11.42 TYPE 2 DIABETES MELLITUS WITH DIABETIC POLYNEUROPATHY, WITHOUT LONG-TERM CURRENT USE OF INSULIN: Primary | ICD-10-CM

## 2025-01-16 RX ORDER — TIRZEPATIDE 2.5 MG/.5ML
2.5 INJECTION, SOLUTION SUBCUTANEOUS
Qty: 2 ML | Refills: 0 | Status: SHIPPED | OUTPATIENT
Start: 2025-01-16

## 2025-01-16 NOTE — TELEPHONE ENCOUNTER
----- Message from Chelsea sent at 1/16/2025 10:34 AM CST -----  Regarding: med  .Type:  Needs Medical Advice    Who Called: pt wife   Symptoms (please be specific):    How long has patient had these symptoms:    Pharmacy name and phone #:    Would the patient rather a call back or a response via [facility]?   Best Call Back Number:  [phone]  Additional Information: pt request to switch diabetic med

## 2025-02-18 DIAGNOSIS — E11.42 TYPE 2 DIABETES MELLITUS WITH DIABETIC POLYNEUROPATHY, WITHOUT LONG-TERM CURRENT USE OF INSULIN: ICD-10-CM

## 2025-02-18 RX ORDER — TIRZEPATIDE 5 MG/.5ML
5 INJECTION, SOLUTION SUBCUTANEOUS
Qty: 2 ML | Refills: 0 | Status: SHIPPED | OUTPATIENT
Start: 2025-02-18

## 2025-03-25 ENCOUNTER — PATIENT OUTREACH (OUTPATIENT)
Facility: CLINIC | Age: 80
End: 2025-03-25
Payer: MEDICARE

## 2025-03-25 DIAGNOSIS — E11.42 TYPE 2 DIABETES MELLITUS WITH DIABETIC POLYNEUROPATHY, WITHOUT LONG-TERM CURRENT USE OF INSULIN: Primary | ICD-10-CM

## 2025-03-25 NOTE — LETTER
AUTHORIZATION FOR RELEASE OF   CONFIDENTIAL INFORMATION        We are seeing Galdino Joshi, date of birth 1945, in the clinic at [place] [address] INTERNAL MEDICINE. Francesco Salomon MD is the patient's PCP. Galdino Joshi has an outstanding lab/procedure at the time we reviewed his chart. In order to help keep his health information updated, he has authorized us to request the following medical record(s):                                                ( x )  EYE EXAM                Please fax records to Francesco Salomon MD,  at [phone] or email to [email or web address].         Patient Name: Galdino Joshi  : 1945  Patient Phone #: [phone]

## 2025-03-25 NOTE — PROGRESS NOTES
Population Health Outreach.    Health Maintenance Topic(s) Outreach Outcomes & Actions Taken:    Blood Pressure - Outreach Outcomes & Actions Taken  : Primary Care Follow Up Visit Scheduled   BP: 148/76 Abnormal   as of 1/7/2025   Not Eligible for Digital Medicine  PCP appointment in 2 weeks with BP check.     Eye Exam - Outreach Outcomes & Actions Taken  : External Records Requested & Care Team Updated if Applicable         Additional Notes: DM well controlled:  Hemoglobin A1c 6.7   2 months ago        Next Primary Care Visit Date: 4/7/2025

## 2025-03-26 ENCOUNTER — TELEPHONE (OUTPATIENT)
Dept: INTERNAL MEDICINE | Facility: CLINIC | Age: 80
End: 2025-03-26
Payer: MEDICARE

## 2025-03-26 NOTE — TELEPHONE ENCOUNTER
----- Message from Med Assistant Jenkins sent at 3/26/2025  9:56 AM CDT -----  Regarding: PV 4/7/25 @ 10:00 Dr. Thacker  1. Are there any outstanding tasks in the patient's chart? Yes, Fasting Labs 2. Does patient have home blood pressure cuff?  [ ] Yes  /   [ ] No(If yes, please have patient bring to appointment for validation.)3. Remind patient to bring in a list of medications or bottles of all medications including: A. All Prescription MedicationsB. Over-the-Counter Supplements and/or VitaminsC. Drops (ear and/or eye)D. Topical Creams

## 2025-04-03 ENCOUNTER — RESULTS FOLLOW-UP (OUTPATIENT)
Dept: INTERNAL MEDICINE | Facility: CLINIC | Age: 80
End: 2025-04-03

## 2025-04-03 ENCOUNTER — LAB VISIT (OUTPATIENT)
Dept: LAB | Facility: HOSPITAL | Age: 80
End: 2025-04-03
Attending: INTERNAL MEDICINE
Payer: MEDICARE

## 2025-04-03 DIAGNOSIS — E11.42 TYPE 2 DIABETES MELLITUS WITH DIABETIC POLYNEUROPATHY, WITHOUT LONG-TERM CURRENT USE OF INSULIN: ICD-10-CM

## 2025-04-03 LAB
ALBUMIN SERPL-MCNC: 3.7 G/DL (ref 3.4–4.8)
ALBUMIN/GLOB SERPL: 0.8 RATIO (ref 1.1–2)
ALP SERPL-CCNC: 120 UNIT/L (ref 40–150)
ALT SERPL-CCNC: 18 UNIT/L (ref 0–55)
ANION GAP SERPL CALC-SCNC: 9 MEQ/L
AST SERPL-CCNC: 18 UNIT/L (ref 11–45)
BILIRUB SERPL-MCNC: 1.3 MG/DL
BUN SERPL-MCNC: 6.7 MG/DL (ref 8.4–25.7)
CALCIUM SERPL-MCNC: 9.6 MG/DL (ref 8.8–10)
CHLORIDE SERPL-SCNC: 106 MMOL/L (ref 98–107)
CHOLEST SERPL-MCNC: 165 MG/DL
CHOLEST/HDLC SERPL: 3 {RATIO} (ref 0–5)
CO2 SERPL-SCNC: 24 MMOL/L (ref 23–31)
CREAT SERPL-MCNC: 1 MG/DL (ref 0.72–1.25)
CREAT/UREA NIT SERPL: 7
EST. AVERAGE GLUCOSE BLD GHB EST-MCNC: 134.1 MG/DL
GFR SERPLBLD CREATININE-BSD FMLA CKD-EPI: >60 ML/MIN/1.73/M2
GLOBULIN SER-MCNC: 4.4 GM/DL (ref 2.4–3.5)
GLUCOSE SERPL-MCNC: 93 MG/DL (ref 82–115)
HBA1C MFR BLD: 6.3 %
HDLC SERPL-MCNC: 58 MG/DL (ref 35–60)
LDLC SERPL CALC-MCNC: 89 MG/DL (ref 50–140)
POTASSIUM SERPL-SCNC: 3.8 MMOL/L (ref 3.5–5.1)
PROT SERPL-MCNC: 8.1 GM/DL (ref 5.8–7.6)
SODIUM SERPL-SCNC: 139 MMOL/L (ref 136–145)
TRIGL SERPL-MCNC: 89 MG/DL (ref 34–140)
VLDLC SERPL CALC-MCNC: 18 MG/DL

## 2025-04-03 PROCEDURE — 80053 COMPREHEN METABOLIC PANEL: CPT

## 2025-04-03 PROCEDURE — 36415 COLL VENOUS BLD VENIPUNCTURE: CPT

## 2025-04-03 PROCEDURE — 83036 HEMOGLOBIN GLYCOSYLATED A1C: CPT

## 2025-04-03 PROCEDURE — 80061 LIPID PANEL: CPT

## 2025-04-07 ENCOUNTER — OFFICE VISIT (OUTPATIENT)
Dept: INTERNAL MEDICINE | Facility: CLINIC | Age: 80
End: 2025-04-07
Payer: MEDICARE

## 2025-04-07 VITALS
HEIGHT: 69 IN | SYSTOLIC BLOOD PRESSURE: 146 MMHG | OXYGEN SATURATION: 99 % | DIASTOLIC BLOOD PRESSURE: 70 MMHG | TEMPERATURE: 98 F | HEART RATE: 82 BPM | RESPIRATION RATE: 16 BRPM | WEIGHT: 223 LBS | BODY MASS INDEX: 33.03 KG/M2

## 2025-04-07 DIAGNOSIS — E11.42 TYPE 2 DIABETES MELLITUS WITH DIABETIC POLYNEUROPATHY, WITHOUT LONG-TERM CURRENT USE OF INSULIN: ICD-10-CM

## 2025-04-07 DIAGNOSIS — E78.2 MIXED HYPERLIPIDEMIA: Primary | ICD-10-CM

## 2025-04-07 DIAGNOSIS — N18.31 CHRONIC KIDNEY DISEASE, STAGE 3A: ICD-10-CM

## 2025-04-07 DIAGNOSIS — E66.01 SEVERE OBESITY (BMI 35.0-39.9) WITH COMORBIDITY: ICD-10-CM

## 2025-04-07 PROCEDURE — 1159F MED LIST DOCD IN RCRD: CPT | Mod: CPTII,,, | Performed by: INTERNAL MEDICINE

## 2025-04-07 PROCEDURE — 99214 OFFICE O/P EST MOD 30 MIN: CPT | Mod: ,,, | Performed by: INTERNAL MEDICINE

## 2025-04-07 PROCEDURE — 1101F PT FALLS ASSESS-DOCD LE1/YR: CPT | Mod: CPTII,,, | Performed by: INTERNAL MEDICINE

## 2025-04-07 PROCEDURE — 3288F FALL RISK ASSESSMENT DOCD: CPT | Mod: CPTII,,, | Performed by: INTERNAL MEDICINE

## 2025-04-07 PROCEDURE — 3078F DIAST BP <80 MM HG: CPT | Mod: CPTII,,, | Performed by: INTERNAL MEDICINE

## 2025-04-07 PROCEDURE — 3077F SYST BP >= 140 MM HG: CPT | Mod: CPTII,,, | Performed by: INTERNAL MEDICINE

## 2025-04-07 PROCEDURE — 1126F AMNT PAIN NOTED NONE PRSNT: CPT | Mod: CPTII,,, | Performed by: INTERNAL MEDICINE

## 2025-04-07 RX ORDER — ASPIRIN 81 MG/1
81 TABLET ORAL NIGHTLY
COMMUNITY

## 2025-04-07 RX ORDER — EZETIMIBE 10 MG/1
10 TABLET ORAL DAILY
Qty: 90 TABLET | Refills: 3 | Status: SHIPPED | OUTPATIENT
Start: 2025-04-07 | End: 2026-04-07

## 2025-04-07 NOTE — PROGRESS NOTES
Internal Medicine    Patient ID: [id number]     Chief Complaint: Diabetes (3 month f/u)      HPI:     Galdino Joshi is a 79 y.o. male here today for a follow up.     Patient reports doing well on Mounjaro, which he prefers over Ozempic due to fewer side effects. He has occasional GI symptoms such as loose bowels or increased gas when eating certain foods while on Mounjaro. He has lost 10 lbs since January. He attributes the lack of gout flares potentially to the Mounjaro and weight loss.    TEST RESULTS:  Patient's recent lab results show an A1c of 6.3, blood sugar of 93, LDL cholesterol of 89, BUN of 6, and Creatinine of 1.        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    Mixed hyperlipidemia     Proteinuria     Type 2 diabetes mellitus without complication, without long-term current use of insulin         Past Surgical History:   Procedure Laterality Date    CARPAL TUNNEL RELEASE N/A     CARPAL TUNNEL RELEASE N/A     neuroplasty median nerve at carpal tunnel  Right     RELEASE OF ULNAR NERVE AT CUBITAL TUNNEL Right     SPINAL FUSION          Social History     Tobacco Use    Smoking status: Never    Smokeless tobacco: Current     Types: Chew   Substance and Sexual Activity    Alcohol use: Yes     Alcohol/week: 35.0 standard drinks of alcohol     Types: 35 Cans of beer per week    Drug use: Never    Sexual activity: Not Currently        Current Outpatient Medications   Medication Instructions    amLODIPine (NORVASC) 10 mg, Oral    aspirin (ECOTRIN) 81 mg, Nightly    atorvastatin (LIPITOR) 80 mg, Oral, Nightly    dutasteride (AVODART) 0.5 mg, Daily    metFORMIN (GLUCOPHAGE) 1000 MG tablet TAKE 1 TABLET TWICE DAILY    metoprolol succinate (TOPROL-XL) 100 mg, Oral    MOUNJARO 5 mg, Subcutaneous, Every 7 days    olmesartan-hydrochlorothiazide (BENICAR HCT) 40-25 mg per tablet TAKE 1  "TABLET EVERY DAY       Review of patient's allergies indicates:  No Known Allergies     Patient Care Team:  Francesco Salomon MD as PCP - General (Internal Medicine)  Fernandez Sharp MD as Consulting Physician (Urology)  Dwayne Adorno MD as Consulting Physician (Cardiology)  Preeti Dawkins MD as Consulting Physician (Hematology and Oncology)  Noni Luque LPN as Care Coordinator  Kristen Summers OD (Optometry)     Subjective:     Review of Systems    12 point review of systems conducted, negative except as stated in the history of present illness. See HPI for details.    Objective:     Visit Vitals  BP (!) 146/70 (BP Location: Right arm, Patient Position: Sitting)   Pulse 82   Temp 97.6 °F (36.4 °C) (Temporal)   Resp 16   Ht 5' 9" (1.753 m)   Wt 101.2 kg (223 lb)   SpO2 99%   BMI 32.93 kg/m²       Physical Exam  Constitutional:       Appearance: Normal appearance. He is obese.   HENT:      Head: Normocephalic and atraumatic.   Eyes:      Extraocular Movements: Extraocular movements intact.      Pupils: Pupils are equal, round, and reactive to light.   Cardiovascular:      Rate and Rhythm: Normal rate and regular rhythm.   Pulmonary:      Effort: Pulmonary effort is normal.      Breath sounds: Normal breath sounds.   Abdominal:      General: Bowel sounds are normal.      Palpations: Abdomen is soft.   Musculoskeletal:         General: Normal range of motion.   Skin:     General: Skin is warm and dry.   Neurological:      General: No focal deficit present.      Mental Status: He is alert.   Psychiatric:         Mood and Affect: Mood normal.         Labs Reviewed:     Chemistry:  Lab Results   Component Value Date     04/03/2025    K 3.8 04/03/2025    BUN 6.7 (L) 04/03/2025    CREATININE 1.00 04/03/2025    EGFRNORACEVR >60 04/03/2025    GLUCOSE 93 04/03/2025    CALCIUM 9.6 04/03/2025    ALKPHOS 120 04/03/2025    LABPROT 8.1 (H) 04/03/2025    ALBUMIN 3.7 04/03/2025    BILIDIR 0.2 " 05/03/2022    IBILI 0.30 05/03/2022    AST 18 04/03/2025    ALT 18 04/03/2025    [id number] 15 (L) 01/03/2025    TSH 3.788 01/03/2025    KKGWBG4XHBT 1.02 08/09/2023    PSA 7.49 (H) 05/03/2022        Lab Results   Component Value Date    HGBA1C 6.3 04/03/2025        Hematology:  Lab Results   Component Value Date    WBC 6.36 01/03/2025    HGB 14.6 01/03/2025    HCT 45.1 01/03/2025     01/03/2025       Lipid Panel:  Lab Results   Component Value Date    CHOL 165 04/03/2025    HDL 58 04/03/2025    LDL 89.00 04/03/2025    TRIG 89 04/03/2025    TOTALCHOLEST 3 04/03/2025        Urine:  Lab Results   Component Value Date    APPEARANCEUA Clear 01/03/2025    SGUA 1.020 01/03/2025    PROTEINUA Negative 01/03/2025    KETONESUA Negative 01/03/2025    LEUKOCYTESUR Negative 01/03/2025    RBCUA None Seen 01/03/2025    WBCUA None Seen 01/03/2025    BACTERIA None Seen 01/03/2025    CREATRANDUR 117.0 01/03/2025        Assessment and Plan:     Assessment & Plan    E66.01 Severe obesity (BMI 35.0-39.9) with comorbidity  N18.31 Chronic kidney disease, stage 3a  E11.42 Type 2 diabetes mellitus with diabetic polyneuropathy, without long-term current use of insulin  E78.2 Mixed hyperlipidemia        IMPRESSION:   A1c well-controlled at 6.3 with fasting sugars <100.   Cholesterol management: LDL 89, new goal <55 based on recent literature.   White coat HTN noted, home BP log reviewed and excellent.   Weight loss of 10 lbs since January noted.   No current gout flares or joint pain reported.    E66.01 SEVERE OBESITY (BMI 35.0-39.9) WITH COMORBIDITY:  - Continue Mounjaro 5 mg and metformin for weight management and diabetes control.  - Patient has lost 10 lbs since January, which has positively impacted gout flares.  - Blood sugars are excellent with A1c at 6.3 and current blood sugar at 93.  - LDL cholesterol is 89, with a new goal of less than 55.    N18.31 CHRONIC KIDNEY DISEASE, STAGE 3A:  - Patient's creatinine is 1.  - Continue  current management with Mounjaro and metformin.    E11.42 TYPE 2 DIABETES MELLITUS WITH DIABETIC POLYNEUROPATHY, WITHOUT LONG-TERM CURRENT USE OF INSULIN:  - Continue Mounjaro 5 mg and metformin as part of diabetes management.    E78.2 MIXED HYPERLIPIDEMIA:  - Continue atorvastatin 80 mg daily and initiate Zetia 10 mg for comprehensive cholesterol management.        No follow-ups on file. In addition to their scheduled follow up, the patient has also been instructed to follow up on as needed basis.     Future Appointments   Date Time Provider Department Center   7/7/2025 10:40 AM Francesco Salomon MD [facility] [address] [id number]        Francesco Salomon MD

## 2025-04-22 ENCOUNTER — TELEPHONE (OUTPATIENT)
Dept: INTERNAL MEDICINE | Facility: CLINIC | Age: 80
End: 2025-04-22
Payer: MEDICARE

## 2025-04-22 DIAGNOSIS — E11.42 TYPE 2 DIABETES MELLITUS WITH DIABETIC POLYNEUROPATHY, WITHOUT LONG-TERM CURRENT USE OF INSULIN: ICD-10-CM

## 2025-04-22 RX ORDER — TIRZEPATIDE 5 MG/.5ML
5 INJECTION, SOLUTION SUBCUTANEOUS
Qty: 2 ML | Refills: 0 | Status: SHIPPED | OUTPATIENT
Start: 2025-04-22

## 2025-04-22 NOTE — TELEPHONE ENCOUNTER
----- Message from Estrella sent at 4/22/2025  1:31 PM CDT -----  Who Called:Cassie Joshi-SpouseRefill or New Rx:RefillRX Name and Strength:tirzepatide (MOUNJARO) 5 mg/0.5 mL PnIjHow is the patient currently taking it? (ex. 1XDay):Inject 5 mg into the skin every 7 days. - SubcutaneousIs this a 30 day or 90 day RX:n/aLocal or Mail Order:local List of preferred pharmacies on file (remove unneeded): [facility] [address]Ordering Provider:Francesco Salomon, MDPreferred Method of Contact: Phone CallPatient's Preferred Phone Number on File: [phone] Best Call Back Number, if different:Additional Information:  Who Called: Cassie Adi-IliaCaller is requesting assistance/information from provider's office.Symptoms (please be specific): n/a How long has patient had these symptoms:  n/aList of preferred pharmacies on file (remove unneeded):n/aPreferred Method of Contact: Phone CallPatient's Preferred Phone Number on File: [phone] Best Call Back Number, if different:[phone] Information: Caller stated that ezetimibe (ZETIA) 10 mg tablet was not discussed with them. Please advise.

## 2025-05-07 DIAGNOSIS — E78.5 HYPERLIPIDEMIA, UNSPECIFIED HYPERLIPIDEMIA TYPE: ICD-10-CM

## 2025-05-07 RX ORDER — ATORVASTATIN CALCIUM 80 MG/1
80 TABLET, FILM COATED ORAL NIGHTLY
Qty: 90 TABLET | Refills: 3 | Status: SHIPPED | OUTPATIENT
Start: 2025-05-07

## 2025-05-12 DIAGNOSIS — E78.5 HYPERLIPIDEMIA, UNSPECIFIED HYPERLIPIDEMIA TYPE: ICD-10-CM

## 2025-05-12 DIAGNOSIS — E11.59 HYPERTENSION ASSOCIATED WITH DIABETES: ICD-10-CM

## 2025-05-12 DIAGNOSIS — I15.2 HYPERTENSION ASSOCIATED WITH DIABETES: ICD-10-CM

## 2025-05-12 RX ORDER — OLMESARTAN MEDOXOMIL AND HYDROCHLOROTHIAZIDE 40/25 40; 25 MG/1; MG/1
1 TABLET ORAL
Qty: 90 TABLET | Refills: 3 | Status: SHIPPED | OUTPATIENT
Start: 2025-05-12 | End: 2026-05-12

## 2025-05-12 RX ORDER — METFORMIN HYDROCHLORIDE 1000 MG/1
1000 TABLET ORAL 2 TIMES DAILY
Qty: 180 TABLET | Refills: 3 | Status: SHIPPED | OUTPATIENT
Start: 2025-05-12

## 2025-05-14 DIAGNOSIS — E78.5 HYPERLIPIDEMIA, UNSPECIFIED HYPERLIPIDEMIA TYPE: ICD-10-CM

## 2025-05-15 RX ORDER — METOPROLOL SUCCINATE 100 MG/1
100 TABLET, EXTENDED RELEASE ORAL
Qty: 90 TABLET | Refills: 3 | Status: SHIPPED | OUTPATIENT
Start: 2025-05-15

## 2025-06-23 ENCOUNTER — TELEPHONE (OUTPATIENT)
Dept: INTERNAL MEDICINE | Facility: CLINIC | Age: 80
End: 2025-06-23
Payer: MEDICARE

## 2025-06-23 DIAGNOSIS — E78.2 MIXED HYPERLIPIDEMIA: Primary | ICD-10-CM

## 2025-06-23 DIAGNOSIS — E11.42 TYPE 2 DIABETES MELLITUS WITH DIABETIC POLYNEUROPATHY, WITHOUT LONG-TERM CURRENT USE OF INSULIN: ICD-10-CM

## 2025-06-23 DIAGNOSIS — E55.9 VITAMIN D DEFICIENCY: ICD-10-CM

## 2025-06-23 NOTE — TELEPHONE ENCOUNTER
----- Message from Med Assistant Jenkins sent at 6/23/2025  9:38 AM CDT -----  Regarding: PV 7/7/25 @ 10:40 Dr. Thacker  1. Are there any outstanding tasks in the patient's chart? Yes, Fasting Labs     2. Does patient have home blood pressure cuff?  [ ] Yes  /   [ ] No  (If yes, please have patient bring to appointment for validation.)    3. Remind patient to bring in a list of medications or bottles of all medications including:   A. All Prescription Medications  B. Over-the-Counter Supplements and/or Vitamins  C. Drops (ear and/or eye)  D. Topical Creams

## 2025-07-02 ENCOUNTER — TELEPHONE (OUTPATIENT)
Dept: INTERNAL MEDICINE | Facility: CLINIC | Age: 80
End: 2025-07-02
Payer: MEDICARE

## 2025-07-03 ENCOUNTER — LAB VISIT (OUTPATIENT)
Dept: LAB | Facility: HOSPITAL | Age: 80
End: 2025-07-03
Attending: INTERNAL MEDICINE
Payer: MEDICARE

## 2025-07-03 ENCOUNTER — TELEPHONE (OUTPATIENT)
Dept: INTERNAL MEDICINE | Facility: CLINIC | Age: 80
End: 2025-07-03
Payer: MEDICARE

## 2025-07-03 DIAGNOSIS — E11.42 TYPE 2 DIABETES MELLITUS WITH DIABETIC POLYNEUROPATHY, WITHOUT LONG-TERM CURRENT USE OF INSULIN: ICD-10-CM

## 2025-07-03 DIAGNOSIS — E55.9 VITAMIN D DEFICIENCY: ICD-10-CM

## 2025-07-03 DIAGNOSIS — E78.2 MIXED HYPERLIPIDEMIA: ICD-10-CM

## 2025-07-03 LAB
25(OH)D3+25(OH)D2 SERPL-MCNC: 20 NG/ML (ref 30–80)
ALBUMIN SERPL-MCNC: 3.5 G/DL (ref 3.4–4.8)
ALBUMIN/CREAT UR: 7.8 MG/GM CR (ref 0–30)
ALBUMIN/GLOB SERPL: 0.8 RATIO (ref 1.1–2)
ALP SERPL-CCNC: 104 UNIT/L (ref 40–150)
ALT SERPL-CCNC: 19 UNIT/L (ref 0–55)
ANION GAP SERPL CALC-SCNC: 6 MEQ/L
AST SERPL-CCNC: 17 UNIT/L (ref 11–45)
BACTERIA #/AREA URNS AUTO: NORMAL /HPF
BASOPHILS # BLD AUTO: 0.02 X10(3)/MCL
BASOPHILS NFR BLD AUTO: 0.3 %
BILIRUB SERPL-MCNC: 1 MG/DL
BILIRUB UR QL STRIP.AUTO: NEGATIVE
BUN SERPL-MCNC: 7.2 MG/DL (ref 8.4–25.7)
CALCIUM SERPL-MCNC: 9.2 MG/DL (ref 8.8–10)
CHLORIDE SERPL-SCNC: 110 MMOL/L (ref 98–107)
CHOLEST SERPL-MCNC: 157 MG/DL
CHOLEST/HDLC SERPL: 3 {RATIO} (ref 0–5)
CLARITY UR: CLEAR
CO2 SERPL-SCNC: 26 MMOL/L (ref 23–31)
COLOR UR AUTO: ABNORMAL
CREAT SERPL-MCNC: 0.79 MG/DL (ref 0.72–1.25)
CREAT UR-MCNC: 132.6 MG/DL (ref 63–166)
CREAT/UREA NIT SERPL: 9
EOSINOPHIL # BLD AUTO: 0.3 X10(3)/MCL (ref 0–0.9)
EOSINOPHIL NFR BLD AUTO: 4.5 %
ERYTHROCYTE [DISTWIDTH] IN BLOOD BY AUTOMATED COUNT: 14.5 % (ref 11.5–17)
EST. AVERAGE GLUCOSE BLD GHB EST-MCNC: 137 MG/DL
GFR SERPLBLD CREATININE-BSD FMLA CKD-EPI: >60 ML/MIN/1.73/M2
GLOBULIN SER-MCNC: 4.5 GM/DL (ref 2.4–3.5)
GLUCOSE SERPL-MCNC: 138 MG/DL (ref 82–115)
GLUCOSE UR QL STRIP: NEGATIVE
HBA1C MFR BLD: 6.4 %
HCT VFR BLD AUTO: 42.5 % (ref 42–52)
HDLC SERPL-MCNC: 58 MG/DL (ref 35–60)
HGB BLD-MCNC: 14 G/DL (ref 14–18)
HGB UR QL STRIP: ABNORMAL
IMM GRANULOCYTES # BLD AUTO: 0.01 X10(3)/MCL (ref 0–0.04)
IMM GRANULOCYTES NFR BLD AUTO: 0.2 %
KETONES UR QL STRIP: NEGATIVE
LDLC SERPL CALC-MCNC: 86 MG/DL (ref 50–140)
LEUKOCYTE ESTERASE UR QL STRIP: NEGATIVE
LYMPHOCYTES # BLD AUTO: 2.83 X10(3)/MCL (ref 0.6–4.6)
LYMPHOCYTES NFR BLD AUTO: 42.9 %
MCH RBC QN AUTO: 30.6 PG (ref 27–31)
MCHC RBC AUTO-ENTMCNC: 32.9 G/DL (ref 33–36)
MCV RBC AUTO: 92.8 FL (ref 80–94)
MICROALBUMIN UR-MCNC: 10.3 UG/ML
MONOCYTES # BLD AUTO: 0.54 X10(3)/MCL (ref 0.1–1.3)
MONOCYTES NFR BLD AUTO: 8.2 %
NEUTROPHILS # BLD AUTO: 2.9 X10(3)/MCL (ref 2.1–9.2)
NEUTROPHILS NFR BLD AUTO: 43.9 %
NITRITE UR QL STRIP: NEGATIVE
PH UR STRIP: 6 [PH]
PLATELET # BLD AUTO: 251 X10(3)/MCL (ref 130–400)
PMV BLD AUTO: 8.4 FL (ref 7.4–10.4)
POTASSIUM SERPL-SCNC: 3.4 MMOL/L (ref 3.5–5.1)
PROT SERPL-MCNC: 8 GM/DL (ref 5.8–7.6)
PROT UR QL STRIP: NEGATIVE
RBC # BLD AUTO: 4.58 X10(6)/MCL (ref 4.7–6.1)
RBC #/AREA URNS AUTO: NORMAL /HPF
SODIUM SERPL-SCNC: 142 MMOL/L (ref 136–145)
SP GR UR STRIP.AUTO: 1.02 (ref 1–1.03)
SQUAMOUS #/AREA URNS AUTO: NORMAL /HPF
TRIGL SERPL-MCNC: 67 MG/DL (ref 34–140)
UROBILINOGEN UR STRIP-ACNC: 0.2
VLDLC SERPL CALC-MCNC: 13 MG/DL
WBC # BLD AUTO: 6.6 X10(3)/MCL (ref 4.5–11.5)
WBC #/AREA URNS AUTO: NORMAL /HPF

## 2025-07-03 PROCEDURE — 80061 LIPID PANEL: CPT

## 2025-07-03 PROCEDURE — 80053 COMPREHEN METABOLIC PANEL: CPT

## 2025-07-03 PROCEDURE — 36415 COLL VENOUS BLD VENIPUNCTURE: CPT

## 2025-07-03 PROCEDURE — 83036 HEMOGLOBIN GLYCOSYLATED A1C: CPT

## 2025-07-03 PROCEDURE — 81003 URINALYSIS AUTO W/O SCOPE: CPT

## 2025-07-03 PROCEDURE — 82570 ASSAY OF URINE CREATININE: CPT

## 2025-07-03 PROCEDURE — 85025 COMPLETE CBC W/AUTO DIFF WBC: CPT

## 2025-07-03 PROCEDURE — 82306 VITAMIN D 25 HYDROXY: CPT

## 2025-07-07 ENCOUNTER — RESULTS FOLLOW-UP (OUTPATIENT)
Dept: INTERNAL MEDICINE | Facility: CLINIC | Age: 80
End: 2025-07-07

## 2025-07-07 DIAGNOSIS — E87.6 HYPOKALEMIA: Primary | ICD-10-CM

## 2025-07-07 NOTE — PROGRESS NOTES
Please inform patient of results.    Potassium level slightly low. Is he taking hydrochlorothiazide daily?

## 2025-07-10 NOTE — TELEPHONE ENCOUNTER
Copied from CRM #[id number]. Topic: General Inquiry - Return Call  >> Jul 10, 2025 11:56 AM Estrella wrote:  Who Called: Galdino Joshi    Patient is returning phone call    Who Left Message for Patient:Angie Flood LPN  Does the patient know what this is regarding?:test results      Preferred Method of Contact: Phone Call  Patient's Preferred Phone Number on File: [phone]   Best Call Back Number, if different:  Additional Information:

## 2025-07-14 ENCOUNTER — TELEPHONE (OUTPATIENT)
Dept: INTERNAL MEDICINE | Facility: CLINIC | Age: 80
End: 2025-07-14
Payer: MEDICARE

## 2025-07-14 ENCOUNTER — RESULTS FOLLOW-UP (OUTPATIENT)
Dept: INTERNAL MEDICINE | Facility: CLINIC | Age: 80
End: 2025-07-14
Payer: MEDICARE

## 2025-07-14 ENCOUNTER — LAB VISIT (OUTPATIENT)
Dept: LAB | Facility: HOSPITAL | Age: 80
End: 2025-07-14
Payer: MEDICARE

## 2025-07-14 DIAGNOSIS — E87.6 HYPOKALEMIA: ICD-10-CM

## 2025-07-14 LAB
ANION GAP SERPL CALC-SCNC: 7 MEQ/L
BUN SERPL-MCNC: 14.6 MG/DL (ref 8.4–25.7)
CALCIUM SERPL-MCNC: 9.4 MG/DL (ref 8.8–10)
CHLORIDE SERPL-SCNC: 109 MMOL/L (ref 98–107)
CO2 SERPL-SCNC: 23 MMOL/L (ref 23–31)
CREAT SERPL-MCNC: 0.97 MG/DL (ref 0.72–1.25)
CREAT/UREA NIT SERPL: 15
GFR SERPLBLD CREATININE-BSD FMLA CKD-EPI: >60 ML/MIN/1.73/M2
GLUCOSE SERPL-MCNC: 93 MG/DL (ref 82–115)
POTASSIUM SERPL-SCNC: 4.3 MMOL/L (ref 3.5–5.1)
SODIUM SERPL-SCNC: 139 MMOL/L (ref 136–145)

## 2025-07-14 PROCEDURE — 80048 BASIC METABOLIC PNL TOTAL CA: CPT

## 2025-07-14 PROCEDURE — 36415 COLL VENOUS BLD VENIPUNCTURE: CPT

## 2025-07-14 NOTE — TELEPHONE ENCOUNTER
Copied from CRM #[id number]. Topic: General Inquiry - Return Call  >> Jul 14, 2025  2:13 PM Loni wrote:  .Who Called: Galdino Joshi    Patient is returning phone call    Who Left Message for Patient: BHUPINDER Fraire    Does the patient know what this is regarding?: Results    Preferred Method of Contact: Phone Call    Patient's Preferred Phone Number on File: [phone]     Best Call Back Number, if different:    Additional Information: Pt returning missed call. Please advise, thank you

## 2025-08-12 ENCOUNTER — TELEPHONE (OUTPATIENT)
Dept: INTERNAL MEDICINE | Facility: CLINIC | Age: 80
End: 2025-08-12
Payer: MEDICARE

## 2025-08-20 DIAGNOSIS — Z01.818 PRE-OP EXAMINATION: Primary | ICD-10-CM

## 2025-08-20 DIAGNOSIS — D68.32 INCREASE IN EXTRINSIC ANTICOAGULANT: ICD-10-CM

## 2025-08-26 ENCOUNTER — TELEPHONE (OUTPATIENT)
Dept: INTERNAL MEDICINE | Facility: CLINIC | Age: 80
End: 2025-08-26
Payer: MEDICARE